# Patient Record
Sex: FEMALE | Race: WHITE | NOT HISPANIC OR LATINO | Employment: OTHER | URBAN - METROPOLITAN AREA
[De-identification: names, ages, dates, MRNs, and addresses within clinical notes are randomized per-mention and may not be internally consistent; named-entity substitution may affect disease eponyms.]

---

## 2019-09-19 ENCOUNTER — OFFICE VISIT (OUTPATIENT)
Dept: ORTHOPEDICS | Facility: CLINIC | Age: 79
End: 2019-09-19
Payer: MEDICARE

## 2019-09-19 VITALS
HEART RATE: 78 BPM | WEIGHT: 192 LBS | DIASTOLIC BLOOD PRESSURE: 68 MMHG | HEIGHT: 67 IN | BODY MASS INDEX: 30.13 KG/M2 | SYSTOLIC BLOOD PRESSURE: 118 MMHG

## 2019-09-19 DIAGNOSIS — M47.812 ARTHRITIS OF FACET JOINT OF CERVICAL SPINE: ICD-10-CM

## 2019-09-19 DIAGNOSIS — M50.30 DEGENERATIVE CERVICAL DISC: ICD-10-CM

## 2019-09-19 DIAGNOSIS — Z98.890 HISTORY OF REPAIR OF RIGHT ROTATOR CUFF: ICD-10-CM

## 2019-09-19 DIAGNOSIS — Z98.890 HISTORY OF ARTHROSCOPY OF RIGHT SHOULDER: Primary | ICD-10-CM

## 2019-09-19 PROCEDURE — 99204 OFFICE O/P NEW MOD 45 MIN: CPT | Mod: 25,S$GLB,, | Performed by: ORTHOPAEDIC SURGERY

## 2019-09-19 PROCEDURE — 20610 LARGE JOINT ASPIRATION/INJECTION: R SUBACROMIAL BURSA: ICD-10-PCS | Mod: RT,S$GLB,, | Performed by: ORTHOPAEDIC SURGERY

## 2019-09-19 PROCEDURE — 20610 DRAIN/INJ JOINT/BURSA W/O US: CPT | Mod: RT,S$GLB,, | Performed by: ORTHOPAEDIC SURGERY

## 2019-09-19 PROCEDURE — 99204 PR OFFICE/OUTPT VISIT, NEW, LEVL IV, 45-59 MIN: ICD-10-PCS | Mod: 25,S$GLB,, | Performed by: ORTHOPAEDIC SURGERY

## 2019-09-19 RX ORDER — METHYLPREDNISOLONE ACETATE 40 MG/ML
40 INJECTION, SUSPENSION INTRA-ARTICULAR; INTRALESIONAL; INTRAMUSCULAR; SOFT TISSUE
Status: DISCONTINUED | OUTPATIENT
Start: 2019-09-19 | End: 2019-09-19 | Stop reason: HOSPADM

## 2019-09-19 RX ORDER — DULOXETIN HYDROCHLORIDE 60 MG/1
60 CAPSULE, DELAYED RELEASE ORAL DAILY
COMMUNITY
End: 2020-06-03

## 2019-09-19 RX ORDER — AMLODIPINE BESYLATE 5 MG/1
5 TABLET ORAL DAILY
COMMUNITY
End: 2020-06-03

## 2019-09-19 RX ORDER — DICLOFENAC SODIUM 1 MG/ML
1 SOLUTION/ DROPS OPHTHALMIC 4 TIMES DAILY
COMMUNITY
End: 2021-12-23

## 2019-09-19 RX ORDER — TRAMADOL HYDROCHLORIDE 50 MG/1
50 TABLET ORAL EVERY 6 HOURS PRN
COMMUNITY
End: 2020-07-01 | Stop reason: SDUPTHER

## 2019-09-19 RX ADMIN — METHYLPREDNISOLONE ACETATE 40 MG: 40 INJECTION, SUSPENSION INTRA-ARTICULAR; INTRALESIONAL; INTRAMUSCULAR; SOFT TISSUE at 11:09

## 2019-09-19 NOTE — PROGRESS NOTES
[facility]    Subjective:     Chief Complaint:   Chief Complaint   Patient presents with    Right Shoulder - Pain     She fell at home in the bathroom on a slippery floor a week ago. Right shoulder pain and neck hurts. She has MRI's done at [facility]  but they are are from 7-11-19       Past Medical History:   Diagnosis Date    Diabetes mellitus, type 2        Past Surgical History:   Procedure Laterality Date    BACK SURGERY      KNEE SURGERY      ovarian tumor      SHOULDER ARTHROSCOPY         Current Outpatient Medications   Medication Sig    amLODIPine (NORVASC) 5 MG tablet Take 5 mg by mouth once daily.    calcium carbonate (OS-IVIS) 600 mg (1,500 mg) Tab Take 600 mg by mouth 2 (two) times daily with meals.    diclofenac (VOLTAREN) 0.1 % ophthalmic solution 1 drop 4 (four) times daily.    DULoxetine (CYMBALTA) 60 MG capsule Take 60 mg by mouth once daily.    escitalopram oxalate (LEXAPRO) 20 MG tablet Take 20 mg by mouth once daily.    evolocumab (REPATHA SURECLICK SUBQ) Inject into the skin.    ranitidine (ZANTAC) 300 MG capsule Take 300 mg by mouth every evening.    traMADol (ULTRAM) 50 mg tablet Take 50 mg by mouth every 6 (six) hours as needed for Pain.    solifenacin (VESICARE) 5 MG tablet Take 1 tablet (5 mg total) by mouth once daily.     No current facility-administered medications for this visit.        Review of patient's allergies indicates:   Allergen Reactions    Adhesive     Ciprofloxacin     Escitalopram oxalate     Pregabalin        No family history on file.    Social History     Socioeconomic History    Marital status:      Spouse name: Not on file    Number of children: Not on file    Years of education: Not on file    Highest education level: Not on file   Occupational History    Not on file   Social Needs    Financial resource strain: Not on file    Food insecurity:     Worry: Not on file     Inability: Not on file    Transportation needs:      Medical: Not on file     Non-medical: Not on file   Tobacco Use    Smoking status: Never Smoker   Substance and Sexual Activity    Alcohol use: Yes     Comment: occasionally    Drug use: No    Sexual activity: Not on file   Lifestyle    Physical activity:     Days per week: Not on file     Minutes per session: Not on file    Stress: Not on file   Relationships    Social connections:     Talks on phone: Not on file     Gets together: Not on file     Attends [employer] service: Not on file     Active member of club or organization: Not on file     Attends meetings of clubs or organizations: Not on file     Relationship status: Not on file   Other Topics Concern    Not on file   Social History Narrative    Not on file       History of present illness: Patient comes in today for her right shoulder and her neck. She's had long-standing right shoulder neck pain. She has undergone a right rotator cuff repair. Unfortunately that failed.      Review of Systems:    Constitution: Negative for chills, fever, and sweats.  Negative for unexplained weight loss.    HENT:  Negative for headaches and blurry vision.    Cardiovascular:Negative for chest pain or irregular heart beat. Negative for hypertension.    Respiratory:  Negative for cough and shortness of breath.    Gastrointestinal: Negative for abdominal pain, heartburn, melena, nausea, and vomitting.    Genitourinary:  Negative bladder incontinence and dysuria.    Musculoskeletal:  See HPI for details.     Neurological: Negative for numbness.    Psychiatric/Behavioral: Negative for depression.  The patient is not nervous/anxious.      Endocrine: Negative for polyuria    Hematologic/Lymphatic: Negative for bleeding problem.  Does not bruise/bleed easily.    Skin: Negative for poor would healing and rash    Objective:      Physical Examination:    Vital Signs:    Vitals:    09/19/19 1052   BP: 118/68   Pulse: 78       Body mass index is 30.07 kg/m².    This a  well-developed, well nourished patient in no acute distress.  They are alert and oriented and cooperative to examination.        Patient has full range of motion of the right shoulder. Her rotator cuff is significantly weak. Spurling sign is negative. She does have pain with motion of the cervical spine. She has limitation of motion of the cervical spine. Full range of motion of the left shoulder.  Pertinent New Results:    XRAY Report / Interpretation:   AP and lateral of the right shoulder demonstrates retained hardware consistent with prior rotator cuff repair    AP and lateral of the cervical spine demonstrates severe degenerative changes of the cervical segments primarily the midcervical segments.    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 I injected the subacromial space on the right shoulder with Depo-Medrol and lidocaine. I've referred her for an epidural steroid injection for her neck. We will check her back in 6 weeks      This note was created using Dragon voice recognition software that occasionally misinterpreted phrases or words.

## 2019-09-19 NOTE — PROCEDURES
Large Joint Aspiration/Injection: R subacromial bursa  Date/Time: 9/19/2019 11:29 AM  Performed by: Sridhar Pitts MD  Authorized by: Sridhar Pitts MD     Consent Done?:  Yes (Verbal)  Indications:  Pain  Procedure site marked: Yes    Timeout: Prior to procedure the correct patient, procedure, and site was verified    Anesthesia  Local anesthesia used  Anesthesia: local infiltration  Anesthetic: lidocaine 1% without epinephrine    Location:  Shoulder  Site:  R subacromial bursa  Prep: Patient was prepped and draped in usual sterile fashion    Needle size:  25 G  Medications:  40 mg methylPREDNISolone acetate 40 mg/mL; 40 mg methylPREDNISolone acetate 40 mg/mL  Patient tolerance:  Patient tolerated the procedure well with no immediate complications

## 2019-09-23 ENCOUNTER — OFFICE VISIT (OUTPATIENT)
Dept: PAIN MEDICINE | Facility: CLINIC | Age: 79
End: 2019-09-23
Payer: MEDICARE

## 2019-09-23 VITALS
HEIGHT: 67 IN | BODY MASS INDEX: 30.13 KG/M2 | SYSTOLIC BLOOD PRESSURE: 116 MMHG | HEART RATE: 82 BPM | WEIGHT: 192 LBS | DIASTOLIC BLOOD PRESSURE: 74 MMHG

## 2019-09-23 DIAGNOSIS — M54.12 CERVICAL RADICULITIS: Primary | ICD-10-CM

## 2019-09-23 DIAGNOSIS — M50.30 DDD (DEGENERATIVE DISC DISEASE), CERVICAL: Primary | ICD-10-CM

## 2019-09-23 DIAGNOSIS — M47.812 ARTHROPATHY OF CERVICAL FACET JOINT: ICD-10-CM

## 2019-09-23 PROCEDURE — 99204 OFFICE O/P NEW MOD 45 MIN: CPT | Mod: S$PBB,,, | Performed by: ANESTHESIOLOGY

## 2019-09-23 PROCEDURE — 99999 PR PBB SHADOW E&M-EST. PATIENT-LVL IV: CPT | Mod: PBBFAC,,, | Performed by: ANESTHESIOLOGY

## 2019-09-23 PROCEDURE — 99214 OFFICE O/P EST MOD 30 MIN: CPT | Mod: PBBFAC,PN | Performed by: ANESTHESIOLOGY

## 2019-09-23 PROCEDURE — 99999 PR PBB SHADOW E&M-EST. PATIENT-LVL IV: ICD-10-PCS | Mod: PBBFAC,,, | Performed by: ANESTHESIOLOGY

## 2019-09-23 PROCEDURE — 99204 PR OFFICE/OUTPT VISIT, NEW, LEVL IV, 45-59 MIN: ICD-10-PCS | Mod: S$PBB,,, | Performed by: ANESTHESIOLOGY

## 2019-09-23 RX ORDER — LORATADINE 10 MG/1
10 TABLET ORAL DAILY
COMMUNITY
End: 2020-06-03

## 2019-09-23 RX ORDER — BACLOFEN 10 MG/1
10 TABLET ORAL 3 TIMES DAILY
COMMUNITY
End: 2019-10-10

## 2019-09-23 RX ORDER — QUETIAPINE FUMARATE 25 MG/1
25 TABLET, FILM COATED ORAL DAILY
COMMUNITY
End: 2020-06-03

## 2019-09-23 RX ORDER — HYDROCHLOROTHIAZIDE 25 MG/1
25 TABLET ORAL DAILY
COMMUNITY
End: 2020-06-03

## 2019-09-23 RX ORDER — LISINOPRIL 10 MG/1
10 TABLET ORAL DAILY
COMMUNITY

## 2019-09-23 RX ORDER — CETIRIZINE HYDROCHLORIDE 10 MG/1
10 TABLET ORAL DAILY
COMMUNITY

## 2019-09-23 RX ORDER — ERGOCALCIFEROL 1.25 MG/1
50000 CAPSULE ORAL
COMMUNITY

## 2019-09-23 RX ORDER — BUSPIRONE HYDROCHLORIDE 10 MG/1
10 TABLET ORAL 3 TIMES DAILY
COMMUNITY
End: 2021-12-23

## 2019-09-23 RX ORDER — FOLIC ACID 1 MG/1
1 TABLET ORAL DAILY
COMMUNITY

## 2019-09-23 RX ORDER — PANTOPRAZOLE SODIUM 40 MG/1
40 TABLET, DELAYED RELEASE ORAL DAILY
COMMUNITY

## 2019-09-23 NOTE — PROGRESS NOTES
Referring Physician: Sridhar Pitts MD    PCP: Ernst Jacobo MD    CC: neck and shoulder pain    HPI:   Brittney Freitas is a 79 y.o. female with PMH significant for hx of right rotator cuff repair (1/2019), ITP (sees Hematologist in [place]), HTN, and GERD presents for the evaluation of neck pain. Neck pain began < 1 year ago. Patient cannot recall neck pain prior to her right rotator cuff surgery. Patient does endorse of intermittent falls sporadically but cannot identify any specific inciting event to her trauma. Patient localizes her neck pain to the right side of her neck. The patient denies of radiation of her pain from her neck. Patient denies of any of numbness or tingling sensation. Patient reports that her pain is a 8-9/10. Patient reports that her pain is constant, burning and sharp pain. Patient denies of any urinary/fecal incontinence, saddle anesthesia, or recent falls.     Aggravating factors: extension of the neck and lateral rotation     Mitigating factors: neck injection at [facility] years ago.     Relevant Surgeries: yes; right rotator cuff repair; hx of lumbar surgery     Interventional Therapies: yes   9/19/2019: R subacromial bursa with some benefit via Dr. Pitts     Reports of receiving neck injections at [facility] years ago with some benefit    : Reviewed and consistent with medication use as prescribed.    Non-pharmacologic Treatment:     · Physical Therapy: yes; shoulder PT worsened her pain   · Ice/Heat: no  · TENS: no  · Massage: no  · Chiropractic care: no  · Acupuncture: no         Pain Medications:         · Currently taking:   · - Opioids: Ultram (Tramadol HCL)   · Quetiapine 25 mg PO QHS; duloxetine 60 mg PO daily; buspirone 10 mg      · Has tried in the past:    · Opioids: yes, tramadol 50 mg   · NSAIDS: no, cannot take secondary to ITP  · Tylenol: yes; takes extra strength tylenol   · Muscle relaxants: yes; baclofen without relief   · TCAs:  no  · SNRIs: no   · Anticonvulsants: no  · topical creams: yes; tried topical creams in the past     Anticoagulation: no    ROS:  Review of Systems   Constitutional: Negative for chills and fever.   HENT: Negative for sore throat.    Eyes: Negative for visual disturbance.   Respiratory: Negative for shortness of breath.    Cardiovascular: Negative for chest pain.   Gastrointestinal: Negative for nausea and vomiting.   Genitourinary: Negative for difficulty urinating.   Musculoskeletal: Positive for arthralgias and neck pain.   Skin: Negative for rash.   Allergic/Immunologic: Negative for immunocompromised state.   Neurological: Negative for syncope.   Hematological: Does not bruise/bleed easily.   Psychiatric/Behavioral: Negative for suicidal ideas.        Past Medical History:   Diagnosis Date    Diabetes mellitus, type 2      Past Surgical History:   Procedure Laterality Date    BACK SURGERY      KNEE SURGERY      ovarian tumor      SHOULDER ARTHROSCOPY       History reviewed. No pertinent family history.  Social History     Socioeconomic History    Marital status:      Spouse name: Not on file    Number of children: Not on file    Years of education: Not on file    Highest education level: Not on file   Occupational History    Not on file   Social Needs    Financial resource strain: Not on file    Food insecurity:     Worry: Not on file     Inability: Not on file    Transportation needs:     Medical: Not on file     Non-medical: Not on file   Tobacco Use    Smoking status: Never Smoker    Smokeless tobacco: Never Used   Substance and Sexual Activity    Alcohol use: Yes     Comment: occasionally    Drug use: No    Sexual activity: Not on file   Lifestyle    Physical activity:     Days per week: Not on file     Minutes per session: Not on file    Stress: Not on file   Relationships    Social connections:     Talks on phone: Not on file     Gets together: Not on file     Attends Congregation  "service: Not on file     Active member of club or organization: Not on file     Attends meetings of clubs or organizations: Not on file     Relationship status: Not on file   Other Topics Concern    Not on file   Social History Narrative    Not on file         Allergies: See med card    Vitals:    09/23/19 1026   BP: 116/74   Pulse: 82   Weight: 87.1 kg (192 lb 0.3 oz)   Height: 5' 7" (1.702 m)   PainSc: 10-Worst pain ever   PainLoc: Generalized         Physical exam:  Physical Exam   Constitutional: She is oriented to person, place, and time and well-developed, well-nourished, and in no distress.   HENT:   Head: Normocephalic and atraumatic.   Eyes: Conjunctivae and EOM are normal. Right eye exhibits no discharge. Left eye exhibits no discharge.   Cardiovascular: Normal rate.   Pulmonary/Chest: Effort normal and breath sounds normal. No respiratory distress.   Abdominal: Soft.   Neurological: She is alert and oriented to person, place, and time.   Skin: Skin is warm and dry. No rash noted. She is not diaphoretic.   Psychiatric: Mood, memory, affect and judgment normal.   Nursing note and vitals reviewed.       UPPER EXTREMITIES: Normal alignment, normal range of motion, no atrophy, no skin changes,  hair growth and nail growth normal and equal bilaterally. No swelling, no tenderness.    LOWER EXTREMITIES:  Normal alignment, normal range of motion, no atrophy, no skin changes,  hair growth and nail growth normal and equal bilaterally. No swelling, no tenderness.    CERVICAL SPINE:  Cervical spine: Limited ROM is in flexion, extension and lateral rotation with increased pain.  ((+)) Spurling's maneuver bilaterally for neck pain but not radicular pain  Myofascial exam: Tenderness to palpation across cervical paraspinous region bilaterally.    CRANIAL NERVES:  II:  PERRL bilaterally,   III,IV,VI: EOMI.    V:  Facial sensation equal bilaterally  VII:  Facial motor function normal.  VIII:  Hearing equal to finger rub " bilaterally  IX/X: Gag normal, palate symmetric  XI:  Shoulder shrug equal, head turn equal  XII:  Tongue midline without fasciculations      MOTOR: Tone and bulk: normal bilateral upper and lower Strength: normal   Delt Bi Tri WE WF     R 5 5 5 5 5 5   L 5 5 5 5 5 5     IP ADD ABD Quad TA Gas HAM  R 5 5 5 5 5 5 5  L 5 5 5 5 5 5 5    SENSATION: Decreased sensation in the C8 distribution of the right hand.   REFLEXES: normal, symmetric, nonbrisk.  Toes down, no clonus. Negative clark's sign bilaterally.  GAIT: normal rise, base, steps, and arm swing.        Imaging: OS Cervical MRI without contrast report (4/10/2019):  C2/C3: Disc desiccation. No disc herniation. The AP canal diameter measures 10.8 mm. Uncovertebral joint osteophytosis and facet joint arthrosis cause mild left foraminal stenosis. Minimal right foraminal stenosis.   C3/C4: Mild disc space narrowing. Mild annular disc bulge. Uncovertebral joint osteophytosis, severe facet joint arthrosis and ligamentum flavum hypertrophy. Severe right-sided facet joint arthrosis with facet joint marrow edema and a facet joint effusion. Partial circumferential effacement of the dural sac with a narrowed AP canal diameter measuring 8.4 mm. Severe bilateral foraminal stenosis.   C4/C5: Mild disc space narrowing. Mild annular disc bulge. Small central disc protrusion. Uncovertebral joint osteophytosis, advanced facet joint arthrosis and ligamentum flavum hypertrophy. Partial circumferential effacement of the dural sac with a narrowed AP canal diameter measuring 8.6 mm. Moderate-to-severe bilateral foraminal stenosis.   C5/C6: Severe disc space narrowing. Mild annular disc bulge. Uncovertebral joint osteophytosis, advanced facet joint arthrosis and ligamentum flavum hypertrophy. Partial circumferential effacement of the dural sac with a narrowed AP canal diameter measuring 8.5 mm. Severe right foraminal stenosis. Moderate-to-severe left foraminal stenosis.   C6/C7:  Severe disc space narrowing. Mild annular disc bulge. Small central disc protrusion. Uncovertebral joint osteophytosis, advanced facet joint arthrosis and ligamentum flavum hypertrophy. Partial circumferential effacement of the dural sac with a narrowed AP canal diameter measuring 9.2 mm. Severe bilateral foraminal stenosis.   C7/T1: Mild disc space narrowing. Minimal central disc protrusion. The AP canal diameter measures 11.2 mm. Uncovertebral joint osteophytes and facet joint arthrosis. Patent neural foramen.       Assessment: Brittney Freitas is a 79 y.o. female with PMH significant for hx of right rotator cuff repair (1/2019), ITP (sees Hematologist in [place]), HTN, and GERD presents for the evaluation of neck pain. Recent MRI of the cervical spine significant for DDD of the cervical spine and cervical radiculopathy. Treatment plan outlined below.     Plan:  - Schedule for cervical interlaminar epidural steroid injection at C7-T1; patient to bring recent platelet count prior to having her procedure done.   - Compound cream prescribed to use PRN  - RTC for procedure as listed above     Thank you for referring this interesting patient, and I look forward to continuing to collaborate in her care.    Nicholas Pardo MD  Pain Medicine

## 2019-09-23 NOTE — LETTER
September 23, 2019      Sridhar Pitts MD  [address]  [address]  [place] [address]           [place] [facility]  [address] [place] [address] [address]  [place] [address]  Phone: [phone]  Fax: [phone]          Patient: Brittney Freitas   MR Number: [id number]   YOB: 1940   Date of Visit: 9/23/2019       Dear Dr. Sridhar Pitts:    Thank you for referring Brittney Freitas to me for evaluation. Attached you will find relevant portions of my assessment and plan of care.    If you have questions, please do not hesitate to call me. I look forward to following Brittney Freitas along with you.    Sincerely,    Nicholas Pardo MD    Enclosure  CC:  No Recipients    If you would like to receive this communication electronically, please contact [email or web address] or [phone] to request more information on [employer] access.    For providers and/or their staff who would like to refer a patient to [facility], please contact us through our one-stop-shop provider referral line, [facility], at [phone].    If you feel you have received this communication in error or would no longer like to receive these types of communications, please e-mail [email or web address]

## 2019-09-23 NOTE — H&P (VIEW-ONLY)
Referring Physician: Sridhar Pitts MD    PCP: Ernst Jacobo MD    CC: neck and shoulder pain    HPI:   Brittney Freitas is a 79 y.o. female with PMH significant for hx of right rotator cuff repair (1/2019), ITP (sees Hematologist in [place]), HTN, and GERD presents for the evaluation of neck pain. Neck pain began < 1 year ago. Patient cannot recall neck pain prior to her right rotator cuff surgery. Patient does endorse of intermittent falls sporadically but cannot identify any specific inciting event to her trauma. Patient localizes her neck pain to the right side of her neck. The patient denies of radiation of her pain from her neck. Patient denies of any of numbness or tingling sensation. Patient reports that her pain is a 8-9/10. Patient reports that her pain is constant, burning and sharp pain. Patient denies of any urinary/fecal incontinence, saddle anesthesia, or recent falls.     Aggravating factors: extension of the neck and lateral rotation     Mitigating factors: neck injection at [facility] years ago.     Relevant Surgeries: yes; right rotator cuff repair; hx of lumbar surgery     Interventional Therapies: yes   9/19/2019: R subacromial bursa with some benefit via Dr. Pitts     Reports of receiving neck injections at [facility] years ago with some benefit    : Reviewed and consistent with medication use as prescribed.    Non-pharmacologic Treatment:     · Physical Therapy: yes; shoulder PT worsened her pain   · Ice/Heat: no  · TENS: no  · Massage: no  · Chiropractic care: no  · Acupuncture: no         Pain Medications:         · Currently taking:   · - Opioids: Ultram (Tramadol HCL)   · Quetiapine 25 mg PO QHS; duloxetine 60 mg PO daily; buspirone 10 mg      · Has tried in the past:    · Opioids: yes, tramadol 50 mg   · NSAIDS: no, cannot take secondary to ITP  · Tylenol: yes; takes extra strength tylenol   · Muscle relaxants: yes; baclofen without relief   · TCAs:  no  · SNRIs: no   · Anticonvulsants: no  · topical creams: yes; tried topical creams in the past     Anticoagulation: no    ROS:  Review of Systems   Constitutional: Negative for chills and fever.   HENT: Negative for sore throat.    Eyes: Negative for visual disturbance.   Respiratory: Negative for shortness of breath.    Cardiovascular: Negative for chest pain.   Gastrointestinal: Negative for nausea and vomiting.   Genitourinary: Negative for difficulty urinating.   Musculoskeletal: Positive for arthralgias and neck pain.   Skin: Negative for rash.   Allergic/Immunologic: Negative for immunocompromised state.   Neurological: Negative for syncope.   Hematological: Does not bruise/bleed easily.   Psychiatric/Behavioral: Negative for suicidal ideas.        Past Medical History:   Diagnosis Date    Diabetes mellitus, type 2      Past Surgical History:   Procedure Laterality Date    BACK SURGERY      KNEE SURGERY      ovarian tumor      SHOULDER ARTHROSCOPY       History reviewed. No pertinent family history.  Social History     Socioeconomic History    Marital status:      Spouse name: Not on file    Number of children: Not on file    Years of education: Not on file    Highest education level: Not on file   Occupational History    Not on file   Social Needs    Financial resource strain: Not on file    Food insecurity:     Worry: Not on file     Inability: Not on file    Transportation needs:     Medical: Not on file     Non-medical: Not on file   Tobacco Use    Smoking status: Never Smoker    Smokeless tobacco: Never Used   Substance and Sexual Activity    Alcohol use: Yes     Comment: occasionally    Drug use: No    Sexual activity: Not on file   Lifestyle    Physical activity:     Days per week: Not on file     Minutes per session: Not on file    Stress: Not on file   Relationships    Social connections:     Talks on phone: Not on file     Gets together: Not on file     Attends Yarsanism  "service: Not on file     Active member of club or organization: Not on file     Attends meetings of clubs or organizations: Not on file     Relationship status: Not on file   Other Topics Concern    Not on file   Social History Narrative    Not on file         Allergies: See med card    Vitals:    09/23/19 1026   BP: 116/74   Pulse: 82   Weight: 87.1 kg (192 lb 0.3 oz)   Height: 5' 7" (1.702 m)   PainSc: 10-Worst pain ever   PainLoc: Generalized         Physical exam:  Physical Exam   Constitutional: She is oriented to person, place, and time and well-developed, well-nourished, and in no distress.   HENT:   Head: Normocephalic and atraumatic.   Eyes: Conjunctivae and EOM are normal. Right eye exhibits no discharge. Left eye exhibits no discharge.   Cardiovascular: Normal rate.   Pulmonary/Chest: Effort normal and breath sounds normal. No respiratory distress.   Abdominal: Soft.   Neurological: She is alert and oriented to person, place, and time.   Skin: Skin is warm and dry. No rash noted. She is not diaphoretic.   Psychiatric: Mood, memory, affect and judgment normal.   Nursing note and vitals reviewed.       UPPER EXTREMITIES: Normal alignment, normal range of motion, no atrophy, no skin changes,  hair growth and nail growth normal and equal bilaterally. No swelling, no tenderness.    LOWER EXTREMITIES:  Normal alignment, normal range of motion, no atrophy, no skin changes,  hair growth and nail growth normal and equal bilaterally. No swelling, no tenderness.    CERVICAL SPINE:  Cervical spine: Limited ROM is in flexion, extension and lateral rotation with increased pain.  ((+)) Spurling's maneuver bilaterally for neck pain but not radicular pain  Myofascial exam: Tenderness to palpation across cervical paraspinous region bilaterally.    CRANIAL NERVES:  II:  PERRL bilaterally,   III,IV,VI: EOMI.    V:  Facial sensation equal bilaterally  VII:  Facial motor function normal.  VIII:  Hearing equal to finger rub " bilaterally  IX/X: Gag normal, palate symmetric  XI:  Shoulder shrug equal, head turn equal  XII:  Tongue midline without fasciculations      MOTOR: Tone and bulk: normal bilateral upper and lower Strength: normal   Delt Bi Tri WE WF     R 5 5 5 5 5 5   L 5 5 5 5 5 5     IP ADD ABD Quad TA Gas HAM  R 5 5 5 5 5 5 5  L 5 5 5 5 5 5 5    SENSATION: Decreased sensation in the C8 distribution of the right hand.   REFLEXES: normal, symmetric, nonbrisk.  Toes down, no clonus. Negative clark's sign bilaterally.  GAIT: normal rise, base, steps, and arm swing.        Imaging: OS Cervical MRI without contrast report (4/10/2019):  C2/C3: Disc desiccation. No disc herniation. The AP canal diameter measures 10.8 mm. Uncovertebral joint osteophytosis and facet joint arthrosis cause mild left foraminal stenosis. Minimal right foraminal stenosis.   C3/C4: Mild disc space narrowing. Mild annular disc bulge. Uncovertebral joint osteophytosis, severe facet joint arthrosis and ligamentum flavum hypertrophy. Severe right-sided facet joint arthrosis with facet joint marrow edema and a facet joint effusion. Partial circumferential effacement of the dural sac with a narrowed AP canal diameter measuring 8.4 mm. Severe bilateral foraminal stenosis.   C4/C5: Mild disc space narrowing. Mild annular disc bulge. Small central disc protrusion. Uncovertebral joint osteophytosis, advanced facet joint arthrosis and ligamentum flavum hypertrophy. Partial circumferential effacement of the dural sac with a narrowed AP canal diameter measuring 8.6 mm. Moderate-to-severe bilateral foraminal stenosis.   C5/C6: Severe disc space narrowing. Mild annular disc bulge. Uncovertebral joint osteophytosis, advanced facet joint arthrosis and ligamentum flavum hypertrophy. Partial circumferential effacement of the dural sac with a narrowed AP canal diameter measuring 8.5 mm. Severe right foraminal stenosis. Moderate-to-severe left foraminal stenosis.   C6/C7:  Severe disc space narrowing. Mild annular disc bulge. Small central disc protrusion. Uncovertebral joint osteophytosis, advanced facet joint arthrosis and ligamentum flavum hypertrophy. Partial circumferential effacement of the dural sac with a narrowed AP canal diameter measuring 9.2 mm. Severe bilateral foraminal stenosis.   C7/T1: Mild disc space narrowing. Minimal central disc protrusion. The AP canal diameter measures 11.2 mm. Uncovertebral joint osteophytes and facet joint arthrosis. Patent neural foramen.       Assessment: Brittney Freitas is a 79 y.o. female with PMH significant for hx of right rotator cuff repair (1/2019), ITP (sees Hematologist in [place]), HTN, and GERD presents for the evaluation of neck pain. Recent MRI of the cervical spine significant for DDD of the cervical spine and cervical radiculopathy. Treatment plan outlined below.     Plan:  - Schedule for cervical interlaminar epidural steroid injection at C7-T1; patient to bring recent platelet count prior to having her procedure done.   - Compound cream prescribed to use PRN  - RTC for procedure as listed above     Thank you for referring this interesting patient, and I look forward to continuing to collaborate in her care.    Nicholas Pardo MD  Pain Medicine

## 2019-09-24 ENCOUNTER — TELEPHONE (OUTPATIENT)
Dept: PAIN MEDICINE | Facility: CLINIC | Age: 79
End: 2019-09-24

## 2019-09-24 DIAGNOSIS — M54.12 CERVICAL RADICULITIS: Primary | ICD-10-CM

## 2019-09-24 NOTE — TELEPHONE ENCOUNTER
----- Message from Mariel Kaba sent at 9/24/2019 11:23 AM CDT -----  Contact: Patient  Type: Needs Medical Advice    Who Called:  Patient  Best Call Back Number: [phone]  Additional Information: Patient is calling to get labs ordered.Please call back and advise.

## 2019-09-25 ENCOUNTER — LAB VISIT (OUTPATIENT)
Dept: LAB | Facility: HOSPITAL | Age: 79
End: 2019-09-25
Attending: ANESTHESIOLOGY
Payer: MEDICARE

## 2019-09-25 ENCOUNTER — TELEPHONE (OUTPATIENT)
Dept: PAIN MEDICINE | Facility: CLINIC | Age: 79
End: 2019-09-25

## 2019-09-25 DIAGNOSIS — M54.12 CERVICAL RADICULITIS: ICD-10-CM

## 2019-09-25 LAB
BASOPHILS # BLD AUTO: 0.11 K/UL (ref 0–0.2)
BASOPHILS NFR BLD: 1.5 % (ref 0–1.9)
DIFFERENTIAL METHOD: ABNORMAL
EOSINOPHIL # BLD AUTO: 0.2 K/UL (ref 0–0.5)
EOSINOPHIL NFR BLD: 2.4 % (ref 0–8)
ERYTHROCYTE [DISTWIDTH] IN BLOOD BY AUTOMATED COUNT: 12.8 % (ref 11.5–14.5)
HCT VFR BLD AUTO: 41.2 % (ref 37–48.5)
HGB BLD-MCNC: 13.3 G/DL (ref 12–16)
IMM GRANULOCYTES # BLD AUTO: 0.02 K/UL (ref 0–0.04)
LYMPHOCYTES # BLD AUTO: 2.2 K/UL (ref 1–4.8)
LYMPHOCYTES NFR BLD: 29.8 % (ref 18–48)
MCH RBC QN AUTO: 30.2 PG (ref 27–31)
MCHC RBC AUTO-ENTMCNC: 32.3 G/DL (ref 32–36)
MCV RBC AUTO: 93 FL (ref 82–98)
MONOCYTES # BLD AUTO: 0.5 K/UL (ref 0.3–1)
MONOCYTES NFR BLD: 7.2 % (ref 4–15)
NEUTROPHILS # BLD AUTO: 4.3 K/UL (ref 1.8–7.7)
NEUTROPHILS NFR BLD: 58.8 % (ref 38–73)
NRBC BLD-RTO: 0 /100 WBC
PLATELET # BLD AUTO: ABNORMAL K/UL (ref 150–350)
PLATELET BLD QL SMEAR: ABNORMAL
PMV BLD AUTO: ABNORMAL FL (ref 9.2–12.9)
RBC # BLD AUTO: 4.41 M/UL (ref 4–5.4)
WBC # BLD AUTO: 7.36 K/UL (ref 3.9–12.7)

## 2019-09-25 PROCEDURE — 36415 COLL VENOUS BLD VENIPUNCTURE: CPT

## 2019-09-25 PROCEDURE — 85025 COMPLETE CBC W/AUTO DIFF WBC: CPT

## 2019-09-25 NOTE — TELEPHONE ENCOUNTER
"Called the patient in regards to recent platelet count on recent CBC. Numeric value of platelet count could not be obtained secondary to "clumping" of the specimen. Patient to bring clearance from her Hematologist who she has seen for her ITP for her upcoming procedure. All questions answered.     Nicholas Pardo MD  Pain Management  "

## 2019-09-26 ENCOUNTER — TELEPHONE (OUTPATIENT)
Dept: PAIN MEDICINE | Facility: CLINIC | Age: 79
End: 2019-09-26

## 2019-09-26 ENCOUNTER — LAB VISIT (OUTPATIENT)
Dept: LAB | Facility: HOSPITAL | Age: 79
End: 2019-09-26
Attending: ANESTHESIOLOGY
Payer: MEDICARE

## 2019-09-26 DIAGNOSIS — M50.30 DDD (DEGENERATIVE DISC DISEASE), CERVICAL: Primary | ICD-10-CM

## 2019-09-26 DIAGNOSIS — M50.30 DDD (DEGENERATIVE DISC DISEASE), CERVICAL: ICD-10-CM

## 2019-09-26 LAB
BASOPHILS # BLD AUTO: 0.12 K/UL (ref 0–0.2)
BASOPHILS NFR BLD: 1.3 % (ref 0–1.9)
DIFFERENTIAL METHOD: NORMAL
EOSINOPHIL # BLD AUTO: 0.2 K/UL (ref 0–0.5)
EOSINOPHIL NFR BLD: 2.6 % (ref 0–8)
ERYTHROCYTE [DISTWIDTH] IN BLOOD BY AUTOMATED COUNT: 12.8 % (ref 11.5–14.5)
HCT VFR BLD AUTO: 42.4 % (ref 37–48.5)
HGB BLD-MCNC: 14.1 G/DL (ref 12–16)
IMM GRANULOCYTES # BLD AUTO: 0.02 K/UL (ref 0–0.04)
IMM GRANULOCYTES NFR BLD AUTO: 0.2 % (ref 0–0.5)
LYMPHOCYTES # BLD AUTO: 2.9 K/UL (ref 1–4.8)
LYMPHOCYTES NFR BLD: 31.9 % (ref 18–48)
MCH RBC QN AUTO: 30.3 PG (ref 27–31)
MCHC RBC AUTO-ENTMCNC: 33.3 G/DL (ref 32–36)
MCV RBC AUTO: 91 FL (ref 82–98)
MONOCYTES # BLD AUTO: 0.8 K/UL (ref 0.3–1)
MONOCYTES NFR BLD: 8.5 % (ref 4–15)
NEUTROPHILS # BLD AUTO: 5 K/UL (ref 1.8–7.7)
NEUTROPHILS NFR BLD: 55.5 % (ref 38–73)
NRBC BLD-RTO: 0 /100 WBC
PLATELET # BLD AUTO: 260 K/UL (ref 150–350)
PMV BLD AUTO: 10.4 FL (ref 9.2–12.9)
RBC # BLD AUTO: 4.65 M/UL (ref 4–5.4)
WBC # BLD AUTO: 8.96 K/UL (ref 3.9–12.7)

## 2019-09-26 PROCEDURE — 85025 COMPLETE CBC W/AUTO DIFF WBC: CPT

## 2019-09-26 PROCEDURE — 36415 COLL VENOUS BLD VENIPUNCTURE: CPT

## 2019-09-26 NOTE — TELEPHONE ENCOUNTER
----- Message from Mayte Anand sent at 9/26/2019 12:29 PM CDT -----  Contact: self  Patient need orders for labs to be done today in [facility] please per patient       Please call to advice [phone]        Patient states she need labs done for injection appt that is on 09/30    Patient states her blood clotted last lab she done recently per patient

## 2019-09-30 ENCOUNTER — HOSPITAL ENCOUNTER (OUTPATIENT)
Facility: AMBULARY SURGERY CENTER | Age: 79
Discharge: HOME | End: 2019-09-30
Attending: ANESTHESIOLOGY | Admitting: ANESTHESIOLOGY
Payer: MEDICARE

## 2019-09-30 DIAGNOSIS — M50.30 DEGENERATIVE DISC DISEASE, CERVICAL: Primary | ICD-10-CM

## 2019-09-30 LAB — POCT GLUCOSE: 114 MG/DL (ref 70–110)

## 2019-09-30 PROCEDURE — 99152 PR MOD CONSCIOUS SEDATION, SAME PHYS, 5+ YRS, FIRST 15 MIN: ICD-10-PCS | Mod: ,,, | Performed by: ANESTHESIOLOGY

## 2019-09-30 PROCEDURE — 62321 NJX INTERLAMINAR CRV/THRC: CPT | Performed by: ANESTHESIOLOGY

## 2019-09-30 PROCEDURE — 62321 NJX INTERLAMINAR CRV/THRC: CPT | Mod: ,,, | Performed by: ANESTHESIOLOGY

## 2019-09-30 PROCEDURE — 62321 PR INJ CERV/THORAC, W/GUIDANCE: ICD-10-PCS | Mod: ,,, | Performed by: ANESTHESIOLOGY

## 2019-09-30 PROCEDURE — 99152 MOD SED SAME PHYS/QHP 5/>YRS: CPT | Mod: ,,, | Performed by: ANESTHESIOLOGY

## 2019-09-30 RX ORDER — SODIUM CHLORIDE 9 MG/ML
INJECTION, SOLUTION INTRAMUSCULAR; INTRAVENOUS; SUBCUTANEOUS
Status: DISCONTINUED | OUTPATIENT
Start: 2019-09-30 | End: 2019-09-30 | Stop reason: HOSPADM

## 2019-09-30 RX ORDER — LIDOCAINE HYDROCHLORIDE 10 MG/ML
INJECTION, SOLUTION EPIDURAL; INFILTRATION; INTRACAUDAL; PERINEURAL
Status: DISCONTINUED | OUTPATIENT
Start: 2019-09-30 | End: 2019-09-30 | Stop reason: HOSPADM

## 2019-09-30 RX ORDER — MIDAZOLAM HYDROCHLORIDE 2 MG/2ML
INJECTION, SOLUTION INTRAMUSCULAR; INTRAVENOUS
Status: DISCONTINUED | OUTPATIENT
Start: 2019-09-30 | End: 2019-09-30 | Stop reason: HOSPADM

## 2019-09-30 RX ORDER — DEXAMETHASONE SODIUM PHOSPHATE 10 MG/ML
INJECTION INTRAMUSCULAR; INTRAVENOUS
Status: DISCONTINUED | OUTPATIENT
Start: 2019-09-30 | End: 2019-09-30 | Stop reason: HOSPADM

## 2019-09-30 RX ORDER — FENTANYL CITRATE 50 UG/ML
INJECTION, SOLUTION INTRAMUSCULAR; INTRAVENOUS
Status: DISCONTINUED | OUTPATIENT
Start: 2019-09-30 | End: 2019-09-30 | Stop reason: HOSPADM

## 2019-09-30 RX ORDER — SODIUM CHLORIDE, SODIUM LACTATE, POTASSIUM CHLORIDE, CALCIUM CHLORIDE 600; 310; 30; 20 MG/100ML; MG/100ML; MG/100ML; MG/100ML
INJECTION, SOLUTION INTRAVENOUS CONTINUOUS
Status: ACTIVE | OUTPATIENT
Start: 2019-09-30

## 2019-09-30 RX ADMIN — SODIUM CHLORIDE, SODIUM LACTATE, POTASSIUM CHLORIDE, CALCIUM CHLORIDE: 600; 310; 30; 20 INJECTION, SOLUTION INTRAVENOUS at 02:09

## 2019-09-30 NOTE — OP NOTE
PROCEDURE DATE: 9/30/2019    PROCEDURE: C7-T1 cervical interlaminar epidural steroid injection under utilizing fluoroscopy.    DIAGNOSIS: Cervical Degenerative Disc Diease; Cervical Radiculitis  POSTOP DIAGNOSIS: SAME    PHYSICIAN: Nicholas Pardo MD    MEDICATIONS INJECTED:  Dexamethasone 10 mg followed by a slow injection of 4 mL sterile, preservative-free normal saline.    LOCAL ANESTHETIC USED: Lidocaine 1%, 3 ml.    SEDATION MEDICATIONS: RN IV sedation    COMPLICATIONS:  none    ESTIMATED BLOOD LOSS: none    TECHNIQUE:  A time-out was taken to identify patient and procedure prior to starting the procedure.  With the patient laying in a prone position with the neck in a mid-flexed forward position, the area was prepped and draped in the usual sterile fashion using ChloraPrep and a fenestrated drape.  The area was determined under AP fluoroscopic guidance.  Local anesthetic was given using a 25-gauge 1.5 inch needle by raising a wheal and then infiltrating ventrally.  A 3.5 inch 20-gauge Touhy needle was introduced under fluoroscopic guidance to meet the lamina of C7.  The needle was then hinged under the lamina then advanced using loss of resistance technique.  Once the tip of the needle was in the desired position, the 3 mL contrast dye Omnipaque was injected to determine placement and no uptake.  The steroid was then injected slowly followed by a slow injection of 4 mL of the sterile preservative-free normal saline.  The patient tolerated the procedure well.    The patient was monitored after the procedure and was given post-procedure and discharge instructions to follow at home. The patient was discharged in a stable condition.

## 2019-09-30 NOTE — INTERVAL H&P NOTE
The patient has been examined and the H&P has been reviewed:    I concur with the findings and no changes have occurred since H&P was written.    Anesthesia/Surgery risks, benefits and alternative options discussed and understood by patient/family.    This patient has been cleared for surgery in an ambulatory surgical facility    ASA 3,  Mallampatti Score 3  No history of anesthetic complications  Plan for RN IV sedation            Active Hospital Problems    Diagnosis  POA    Degenerative disc disease, cervical [M50.30]  Yes      Resolved Hospital Problems   No resolved problems to display.

## 2019-09-30 NOTE — DISCHARGE SUMMARY
[facility]  Discharge Note  Short Stay    Admit Date: 9/30/2019    Discharge Date and Time: 9/30/2019    Attending Physician: Nicholas Pardo MD     Discharge Provider: Nicholas Pardo    Diagnoses:  Active Hospital Problems    Diagnosis  POA    *Degenerative disc disease, cervical [M50.30]  Yes      Resolved Hospital Problems   No resolved problems to display.       Hospital Course: Cervical interlaminar epidural steroid injection    Discharged Condition: Good    Final Diagnoses:   Active Hospital Problems    Diagnosis  POA    *Degenerative disc disease, cervical [M50.30]  Yes      Resolved Hospital Problems   No resolved problems to display.       Disposition: Home or Self Care    Follow up/Patient Instructions:    Medications:  Reconciled Home Medications:      Medication List      CONTINUE taking these medications    amLODIPine 5 MG tablet  Commonly known as:  NORVASC  Take 5 mg by mouth once daily.     baclofen 10 MG tablet  Commonly known as:  LIORESAL  Take 10 mg by mouth 3 (three) times daily.     busPIRone 10 MG tablet  Commonly known as:  BUSPAR  Take 10 mg by mouth 3 (three) times daily.     calcium carbonate 600 mg calcium (1,500 mg) Tab  Commonly known as:  OS-IVIS  Take 600 mg by mouth 2 (two) times daily with meals.     cetirizine 10 MG tablet  Commonly known as:  ZYRTEC  Take 10 mg by mouth once daily.     CYMBALTA 60 MG capsule  Generic drug:  DULoxetine  Take 60 mg by mouth once daily.     diclofenac 0.1 % ophthalmic solution  Commonly known as:  VOLTAREN  1 drop 4 (four) times daily.     ergocalciferol 50,000 unit Cap  Commonly known as:  ERGOCALCIFEROL  Take 50,000 Units by mouth every 7 days.     escitalopram oxalate 20 MG tablet  Commonly known as:  LEXAPRO  Take 20 mg by mouth once daily.     folic acid 1 MG tablet  Commonly known as:  FOLVITE  Take 1 mg by mouth once daily.     hydroCHLOROthiazide 25 MG tablet  Commonly known as:  HYDRODIURIL  Take 25 mg by mouth once daily.      lisinopril 20 MG tablet  Commonly known as:  PRINIVIL,ZESTRIL  Take 20 mg by mouth once daily.     loratadine 10 mg tablet  Commonly known as:  CLARITIN  Take 10 mg by mouth once daily.     PROTONIX 40 MG tablet  Generic drug:  pantoprazole  Take 40 mg by mouth once daily.     QUEtiapine 25 MG Tab  Commonly known as:  SEROQUEL  Take 25 mg by mouth once daily.     ranitidine 300 MG capsule  Commonly known as:  ZANTAC  Take 300 mg by mouth every evening.     REPATHA SURECLICK SUBQ  Inject into the skin.     solifenacin 5 MG tablet  Commonly known as:  VESICARE  Take 1 tablet (5 mg total) by mouth once daily.     traMADol 50 mg tablet  Commonly known as:  ULTRAM  Take 50 mg by mouth every 6 (six) hours as needed for Pain.          Discharge Procedure Orders   Diet general     Call MD for:  temperature >100.4     Call MD for:  persistent nausea and vomiting     Call MD for:  severe uncontrolled pain     Call MD for:  difficulty breathing, headache or visual disturbances     Call MD for:  redness, tenderness, or signs of infection (pain, swelling, redness, odor or green/yellow discharge around incision site)     Call MD for:  hives     Call MD for:  persistent dizziness or light-headedness     Call MD for:  extreme fatigue        Follow up with MD in 2-3 weeks    Discharge Procedure Orders (must include Diet, Follow-up, Activity):   Discharge Procedure Orders (must include Diet, Follow-up, Activity)   Diet general     Call MD for:  temperature >100.4     Call MD for:  persistent nausea and vomiting     Call MD for:  severe uncontrolled pain     Call MD for:  difficulty breathing, headache or visual disturbances     Call MD for:  redness, tenderness, or signs of infection (pain, swelling, redness, odor or green/yellow discharge around incision site)     Call MD for:  hives     Call MD for:  persistent dizziness or light-headedness     Call MD for:  extreme fatigue

## 2019-10-01 VITALS
RESPIRATION RATE: 18 BRPM | OXYGEN SATURATION: 92 % | DIASTOLIC BLOOD PRESSURE: 74 MMHG | BODY MASS INDEX: 30.13 KG/M2 | WEIGHT: 192 LBS | SYSTOLIC BLOOD PRESSURE: 153 MMHG | HEART RATE: 80 BPM | HEIGHT: 67 IN | TEMPERATURE: 98 F

## 2019-10-10 ENCOUNTER — NURSE TRIAGE (OUTPATIENT)
Dept: ADMINISTRATIVE | Facility: CLINIC | Age: 79
End: 2019-10-10

## 2019-10-10 ENCOUNTER — HOSPITAL ENCOUNTER (EMERGENCY)
Facility: HOSPITAL | Age: 79
Discharge: HOME | End: 2019-10-10
Attending: FAMILY MEDICINE
Payer: MEDICARE

## 2019-10-10 VITALS
SYSTOLIC BLOOD PRESSURE: 149 MMHG | HEART RATE: 104 BPM | RESPIRATION RATE: 19 BRPM | WEIGHT: 184 LBS | OXYGEN SATURATION: 97 % | HEIGHT: 68 IN | TEMPERATURE: 98 F | DIASTOLIC BLOOD PRESSURE: 74 MMHG | BODY MASS INDEX: 27.89 KG/M2

## 2019-10-10 DIAGNOSIS — M54.12 CERVICAL RADICULOPATHY: ICD-10-CM

## 2019-10-10 DIAGNOSIS — M62.838 MUSCLE SPASMS OF NECK: Primary | ICD-10-CM

## 2019-10-10 DIAGNOSIS — M50.30 DEGENERATIVE DISC DISEASE, CERVICAL: ICD-10-CM

## 2019-10-10 PROCEDURE — 99284 EMERGENCY DEPT VISIT MOD MDM: CPT

## 2019-10-10 PROCEDURE — 25000003 PHARM REV CODE 250: Performed by: FAMILY MEDICINE

## 2019-10-10 RX ORDER — GABAPENTIN 300 MG/1
300 CAPSULE ORAL ONCE
Status: COMPLETED | OUTPATIENT
Start: 2019-10-10 | End: 2019-10-10

## 2019-10-10 RX ORDER — METHOCARBAMOL 500 MG/1
500 TABLET, FILM COATED ORAL
Status: COMPLETED | OUTPATIENT
Start: 2019-10-10 | End: 2019-10-10

## 2019-10-10 RX ORDER — METHOCARBAMOL 500 MG/1
1000 TABLET, FILM COATED ORAL 3 TIMES DAILY
Qty: 60 TABLET | Refills: 1 | Status: SHIPPED | OUTPATIENT
Start: 2019-10-10 | End: 2019-10-20

## 2019-10-10 RX ORDER — GABAPENTIN 100 MG/1
300 CAPSULE ORAL 3 TIMES DAILY
Qty: 270 CAPSULE | Refills: 1 | Status: SHIPPED | OUTPATIENT
Start: 2019-10-10 | End: 2021-08-29

## 2019-10-10 RX ORDER — METHOCARBAMOL 500 MG/1
1000 TABLET, FILM COATED ORAL 3 TIMES DAILY
Qty: 30 TABLET | Refills: 1 | Status: SHIPPED | OUTPATIENT
Start: 2019-10-10 | End: 2019-10-10 | Stop reason: SDUPTHER

## 2019-10-10 RX ADMIN — GABAPENTIN 300 MG: 300 CAPSULE ORAL at 05:10

## 2019-10-10 RX ADMIN — METHOCARBAMOL 500 MG: 500 TABLET, FILM COATED ORAL at 06:10

## 2019-10-10 NOTE — ED TRIAGE NOTES
Pt presents with back pain onset January 2019 after R shoulder repair and epigastric pain onset 2 days.

## 2019-10-10 NOTE — TELEPHONE ENCOUNTER
Pt c/o pain that feels like its going from upper back to neck. Pt states she now feels like pain is going through to chest. Pt advised per protocol to call 911 and pt verbalizes understanding. Pt refuses to call 911 and states she will go to ED.     Reason for Disposition   Patient sounds very sick or weak to the triager   Chest pain lasting longer than 5 minutes and ANY of the following:* Over 50 years old* Over 30 years old and at least one cardiac risk factor (i.e., high blood pressure, diabetes, high cholesterol, obesity, smoker or strong family history of heart disease)* Pain is crushing, pressure-like, or heavy * Took nitroglycerin and chest pain was not relieved* History of heart disease (i.e., angina, heart attack, bypass surgery, angioplasty, CHF)    Additional Information   Negative: Passed out (i.e., fainted, collapsed and was not responding)   Negative: Shock suspected (e.g., cold/pale/clammy skin, too weak to stand, low BP, rapid pulse)   Negative: Sounds like a life-threatening emergency to the triager   Negative: SEVERE back pain of sudden onset and age > 60   Negative: SEVERE abdominal pain (e.g., excruciating)   Negative: Abdominal pain and age > 60   Negative: Unable to urinate (or only a few drops) and bladder feels very full   Negative: Loss of bladder or bowel control (urine or bowel incontinence; wetting self, leaking stool) of new onset   Negative: Numbness (loss of sensation) in groin or rectal area   Negative: Pain radiates into groin, scrotum   Negative: Blood in urine (red, pink, or tea-colored)   Negative: Vomiting and pain over lower ribs of back (i.e., flank - kidney area)   Negative: Weakness of a leg or foot (e.g., unable to bear weight, dragging foot)   Negative: Passed out (i.e., fainted, collapsed and was not responding)   Negative: Severe difficulty breathing (e.g., struggling for each breath, speaks in single words)    Protocols used: BACK PAIN-A-OH, CHEST  PAIN-A-OH

## 2019-10-10 NOTE — DISCHARGE INSTRUCTIONS
Keep follow-up appointment with pain management.  Follow-up with primary care physician in the next 3-4 weeks for re-evaluation.  Return to ER as needed if pain is not controlled.

## 2019-10-10 NOTE — ED PROVIDER NOTES
Encounter Date: 10/10/2019       History     Chief Complaint   Patient presents with    Chest Pain    Back Pain     Patient comes our facility complaining of right shoulder pain as well right neck pain. Patient stated that she had rotator cuff surgery in January 29th of this year.  Patient states that she went to physical therapy and after passive range of motion exercises he has had chronic shoulder pain and right shoulder since February of this year.  She has been referred to a pain management doctor.  She has been told she has spinal pathology based off an x-ray.  An MRI was then performed.  Since then she states that a Dr. Pardo has performed epidural injections on her, last injection was 2 weeks prior to today.  Patient continues to have right-sided cervical pain that is sharp in nature.  It radiates down to her trapezius muscle.  She did state she had some occasional sharp chest pain radiating to her back.  All these pains are associated with range of motion.  She has increased pain with range of motion of her neck and right shoulder and trapezius muscle.  She has tenderness over chest and rhomboid muscles as well as her trapezius muscles.    The patient has been prescribed opiates as well as tramadol, as well as muscle relaxers, as well as Neurontin.  Patient has refused to take all these except for the occasional tramadol as she was scared of the medications.        Review of patient's allergies indicates:   Allergen Reactions    Adhesive     Ciprofloxacin     Escitalopram oxalate     Pregabalin      Past Medical History:   Diagnosis Date    Diabetes mellitus, type 2     Hypertension     Reflux esophagitis     Seasonal allergies      Past Surgical History:   Procedure Laterality Date    BACK SURGERY      EPIDURAL STEROID INJECTION INTO CERVICAL SPINE N/A 9/30/2019    Procedure: Injection-steroid-epidural-cervical;  Surgeon: Nicholas Pardo MD;  Location: [facility];  Service: Pain Management;   Laterality: N/A;  C7-T1    KNEE SURGERY      ovarian tumor      SHOULDER ARTHROSCOPY       History reviewed. No pertinent family history.  Social History     Tobacco Use    Smoking status: Never Smoker    Smokeless tobacco: Never Used   Substance Use Topics    Alcohol use: Yes     Comment: occasionally    Drug use: No     Review of Systems   Constitutional: Negative.    HENT: Negative for ear pain, postnasal drip, rhinorrhea, sinus pressure, sinus pain and sore throat.    Eyes: Negative for pain and visual disturbance.   Respiratory: Negative.    Cardiovascular: Negative.         Chest pain is musculoskeletal in origin.   Gastrointestinal: Negative.    Endocrine: Negative for cold intolerance and heat intolerance.   Genitourinary: Positive for frequency. Negative for dysuria and flank pain.   Musculoskeletal: Positive for arthralgias, myalgias and neck pain. Negative for back pain, gait problem, joint swelling and neck stiffness.   Skin: Negative.    Allergic/Immunologic: Negative.    Neurological: Negative for dizziness, tremors, seizures, syncope, facial asymmetry, speech difficulty, weakness, light-headedness, numbness and headaches.   Hematological: Negative.    Psychiatric/Behavioral: Negative for agitation, behavioral problems and confusion.       Physical Exam     Initial Vitals [10/10/19 1555]   BP Pulse Resp Temp SpO2   (!) 149/74 104 19 98 °F (36.7 °C) 97 %      MAP       --         Physical Exam    Constitutional: She appears well-developed and well-nourished. She is not diaphoretic. No distress.   HENT:   Head: Normocephalic and atraumatic.   Nose: Nose normal.   Mouth/Throat: Oropharynx is clear and moist.   Eyes: Conjunctivae and EOM are normal. Pupils are equal, round, and reactive to light. Right eye exhibits no discharge. Left eye exhibits no discharge. No scleral icterus.   Neck: Normal range of motion. Neck supple. No thyromegaly present. No tracheal deviation present. No JVD present.    Cardiovascular: Normal rate, regular rhythm, normal heart sounds and intact distal pulses.   No murmur heard.  Pulmonary/Chest: Breath sounds normal. No stridor. No respiratory distress. She has no wheezes. She has no rhonchi. She has no rales. She exhibits tenderness.   Abdominal: Soft. Bowel sounds are normal. She exhibits no distension. There is no tenderness. There is no rebound and no guarding.   Musculoskeletal: Normal range of motion. She exhibits tenderness. She exhibits no edema.   Neurological: She is alert and oriented to person, place, and time. She has normal strength. No cranial nerve deficit or sensory deficit. GCS score is 15. GCS eye subscore is 4. GCS verbal subscore is 5. GCS motor subscore is 6.   Skin: Skin is warm and dry. Capillary refill takes less than 2 seconds. No abscess noted. No erythema.   Psychiatric: She has a normal mood and affect. Her behavior is normal. Judgment and thought content normal.         ED Course   Procedures  Labs Reviewed - No data to display       Imaging Results    None                               Clinical Impression:       ICD-10-CM ICD-9-CM   1. Muscle spasms of neck M62.838 728.85   2. Cervical radiculopathy M54.12 723.4   3. Degenerative disc disease, cervical M50.30 722.4         Disposition:   Disposition: Discharged  Condition: Stable                        Krystian Machado MD  10/10/19 3256

## 2019-10-31 ENCOUNTER — OFFICE VISIT (OUTPATIENT)
Dept: PAIN MEDICINE | Facility: CLINIC | Age: 79
End: 2019-10-31
Payer: MEDICARE

## 2019-10-31 VITALS
RESPIRATION RATE: 17 BRPM | DIASTOLIC BLOOD PRESSURE: 74 MMHG | BODY MASS INDEX: 27.9 KG/M2 | TEMPERATURE: 98 F | HEART RATE: 99 BPM | OXYGEN SATURATION: 95 % | HEIGHT: 68 IN | WEIGHT: 184.06 LBS | SYSTOLIC BLOOD PRESSURE: 135 MMHG

## 2019-10-31 DIAGNOSIS — M50.30 DDD (DEGENERATIVE DISC DISEASE), CERVICAL: ICD-10-CM

## 2019-10-31 DIAGNOSIS — M47.892 OTHER SPONDYLOSIS, CERVICAL REGION: Primary | ICD-10-CM

## 2019-10-31 PROCEDURE — 99214 PR OFFICE/OUTPT VISIT, EST, LEVL IV, 30-39 MIN: ICD-10-PCS | Mod: S$PBB,,, | Performed by: ANESTHESIOLOGY

## 2019-10-31 PROCEDURE — 99214 OFFICE O/P EST MOD 30 MIN: CPT | Mod: PBBFAC,PO | Performed by: ANESTHESIOLOGY

## 2019-10-31 PROCEDURE — 99999 PR PBB SHADOW E&M-EST. PATIENT-LVL IV: ICD-10-PCS | Mod: PBBFAC,,, | Performed by: ANESTHESIOLOGY

## 2019-10-31 PROCEDURE — 99999 PR PBB SHADOW E&M-EST. PATIENT-LVL IV: CPT | Mod: PBBFAC,,, | Performed by: ANESTHESIOLOGY

## 2019-10-31 PROCEDURE — 99214 OFFICE O/P EST MOD 30 MIN: CPT | Mod: S$PBB,,, | Performed by: ANESTHESIOLOGY

## 2019-10-31 RX ORDER — TRAMADOL HYDROCHLORIDE 50 MG/1
50 TABLET ORAL DAILY PRN
Qty: 30 TABLET | Refills: 0 | Status: SHIPPED | OUTPATIENT
Start: 2019-10-31 | End: 2020-06-03 | Stop reason: SDUPTHER

## 2019-10-31 NOTE — PROGRESS NOTES
FOLLOW UP NOTE:     CHIEF COMPLAINT: neck pain    INITIAL HISTORY OF PRESENT ILLNESS: Brittney Freitas is a 79 y.o. female with PMH significant for hx of right rotator cuff repair (1/2019), ITP (sees Hematologist in [place]), HTN, and GERD presents for the evaluation of neck pain. Neck pain began < 1 year ago. Patient cannot recall neck pain prior to her right rotator cuff surgery. Patient does endorse of intermittent falls sporadically but cannot identify any specific inciting event to her trauma. Patient localizes her neck pain to the right side of her neck. The patient denies of radiation of her pain from her neck. Patient denies of any of numbness or tingling sensation. Patient reports that her pain is a 8-9/10. Patient reports that her pain is constant, burning and sharp pain. Patient denies of any urinary/fecal incontinence, saddle anesthesia, or recent falls.      Aggravating factors: extension of the neck and lateral rotation      Mitigating factors: neck injection at [facility] years ago.      Relevant Surgeries: yes; right rotator cuff repair; hx of lumbar surgery     INTERVAL HISTORY OF PRESENT ILLNESS: Brittney Freitas is a 79 y.o. female with PMH significant for hx of right rotator cuff repair (1/2019), ITP (sees Hematologist in [place]), HTN, and GERD presents as an established patient for the continued management of neck pain. Patient is s/p C7-T1 cervical interlaminar epidural steroid injection on 9/30/2019, and she reports of improvement of some aspects of her neck pain (initial pain score during initial consultation was a 8-9/10 and now it is a 5/10). Patient reports of right sided neck pain today with associated muscle spasms. Patient denies of radiation of her pain. Since her injection, patient did present to the ED for chest pain but did not require admission or new medications. Patient denies of any significant interval changes in her health otherwise.  Patient denies of any  "urinary/fecal incontinence, saddle anesthesia, or weakness.     INTERVENTIONAL PAIN HISTORY:  9/30/2019: C7-T1 cervical interlaminar epidural steroid injection - good relief     CURRENT PAIN MEDICATIONS:   Ultram (Tramadol HCL) PRN  duloxetine 60 mg PO daily    ROS:  Review of Systems   Constitutional: Negative for chills and fever.   HENT: Negative for sore throat.    Eyes: Negative for visual disturbance.   Respiratory: Negative for shortness of breath.    Cardiovascular: Negative for chest pain.   Gastrointestinal: Negative for nausea and vomiting.   Genitourinary: Negative for difficulty urinating.   Musculoskeletal: Positive for neck pain.   Skin: Negative for rash.   Allergic/Immunologic: Negative for immunocompromised state.   Neurological: Negative for syncope.   Hematological: Does not bruise/bleed easily.   Psychiatric/Behavioral: Negative for suicidal ideas.        MEDICAL, SURGICAL, FAMILY, SOCIAL HX: reviewed    MEDICATIONS/ALLERGIES: reviewed    PHYSICAL EXAM:    VITALS: Vitals reviewed.   Vitals:    10/31/19 0909   Resp: 17   Weight: 83.5 kg (184 lb 1.4 oz)   Height: 5' 8" (1.727 m)       Physical Exam   Constitutional: She is oriented to person, place, and time and well-developed, well-nourished, and in no distress.   HENT:   Head: Normocephalic and atraumatic.   Eyes: Conjunctivae and EOM are normal. Right eye exhibits no discharge. Left eye exhibits no discharge.   Cardiovascular: Normal rate.   Pulmonary/Chest: Effort normal and breath sounds normal. No respiratory distress.   Abdominal: Soft.   Neurological: She is alert and oriented to person, place, and time.   Skin: Skin is warm and dry. No rash noted. She is not diaphoretic.   Psychiatric: Mood, memory, affect and judgment normal.   Nursing note and vitals reviewed.     UPPER EXTREMITIES: Normal alignment, normal range of motion, no atrophy, no skin changes,  hair growth and nail growth normal and equal bilaterally. No swelling, no " tenderness.    LOWER EXTREMITIES:  Normal alignment, normal range of motion, no atrophy, no skin changes,  hair growth and nail growth normal and equal bilaterally. No swelling, no tenderness.     CERVICAL SPINE:  Cervical spine: Limited ROM is in flexion, extension and lateral rotation with increased pain.  ((+)) Spurling's maneuver bilaterally for neck pain but not radicular pain  Myofascial exam: Tenderness to palpation across cervical paraspinous region bilaterally.     CRANIAL NERVES:  II:         PERRL bilaterally,   III,IV,VI: EOMI.    V:         Facial sensation equal bilaterally  VII:       Facial motor function normal.  VIII:      Hearing equal to finger rub bilaterally  IX/X:    Gag normal, palate symmetric  XI:        Shoulder shrug equal, head turn equal  XII:       Tongue midline without fasciculations        MOTOR: Tone and bulk: normal bilateral upper and lower Strength: normal   Delt      Bi         Tri        WE      WF                        R          5          5          5          5          5          5            L          5          5          5          5          5          5               IP         ADD     ABD     Quad   TA        Gas      HAM  R          5          5          5          5          5          5          5  L          5          5          5          5          5          5          5     SENSATION: Decreased sensation in the C8 distribution of the right hand.   REFLEXES: normal, symmetric, nonbrisk.  Toes down, no clonus. Negative clark's sign bilaterally.  GAIT: normal rise, base, steps, and arm swing.        IMAGING:  OS Cervical MRI without contrast report (4/10/2019):  C2/C3: Disc desiccation. No disc herniation. The AP canal diameter measures 10.8 mm. Uncovertebral joint osteophytosis and facet joint arthrosis cause mild left foraminal stenosis. Minimal right foraminal stenosis.   C3/C4: Mild disc space narrowing. Mild annular disc bulge. Uncovertebral  joint osteophytosis, severe facet joint arthrosis and ligamentum flavum hypertrophy. Severe right-sided facet joint arthrosis with facet joint marrow edema and a facet joint effusion. Partial circumferential effacement of the dural sac with a narrowed AP canal diameter measuring 8.4 mm. Severe bilateral foraminal stenosis.   C4/C5: Mild disc space narrowing. Mild annular disc bulge. Small central disc protrusion. Uncovertebral joint osteophytosis, advanced facet joint arthrosis and ligamentum flavum hypertrophy. Partial circumferential effacement of the dural sac with a narrowed AP canal diameter measuring 8.6 mm. Moderate-to-severe bilateral foraminal stenosis.   C5/C6: Severe disc space narrowing. Mild annular disc bulge. Uncovertebral joint osteophytosis, advanced facet joint arthrosis and ligamentum flavum hypertrophy. Partial circumferential effacement of the dural sac with a narrowed AP canal diameter measuring 8.5 mm. Severe right foraminal stenosis. Moderate-to-severe left foraminal stenosis.   C6/C7: Severe disc space narrowing. Mild annular disc bulge. Small central disc protrusion. Uncovertebral joint osteophytosis, advanced facet joint arthrosis and ligamentum flavum hypertrophy. Partial circumferential effacement of the dural sac with a narrowed AP canal diameter measuring 9.2 mm. Severe bilateral foraminal stenosis.   C7/T1: Mild disc space narrowing. Minimal central disc protrusion. The AP canal diameter measures 11.2 mm. Uncovertebral joint osteophytes and facet joint arthrosis. Patent neural foramen.       ASSESSMENT: Brittney Freitas is a 79 y.o. female with PMH significant for hx of right rotator cuff repair (1/2019), ITP (sees Hematologist in [place]), HTN, and GERD presents as an established patient for the continued management of neck pain. Patient is s/p C7-T1 cervical interlaminar epidural steroid injection on 9/30/2019, and she reports of improvement of her neck pain. Today, patient  complaining of right sided neck pain without radiation. I suspect that the patient had improvement with her last epidural steroid injection and is now experiencing facetogenic pain. Treatment plan outlined below.     PLAN:  1. Schedule for right C4, C5, C6 medial branch blocks to treat cervical facet arthropathy  2. Prescribed Tramadol 50 mg PO q day PRN for breakthrough pain; #30 tablets; 0 refills  3. RTC for the procedure as outlined above     Nicholas Pardo MD  Pain Medicine

## 2019-12-12 ENCOUNTER — TELEPHONE (OUTPATIENT)
Dept: PAIN MEDICINE | Facility: CLINIC | Age: 79
End: 2019-12-12

## 2019-12-12 NOTE — TELEPHONE ENCOUNTER
Patient came into office, reported drastic improvement in pain since injection. Advised patient to contact office if pain returns. Patient voiced understanding.

## 2020-01-29 ENCOUNTER — OFFICE VISIT (OUTPATIENT)
Dept: PAIN MEDICINE | Facility: CLINIC | Age: 80
End: 2020-01-29
Payer: MEDICARE

## 2020-01-29 VITALS
BODY MASS INDEX: 29.83 KG/M2 | SYSTOLIC BLOOD PRESSURE: 157 MMHG | WEIGHT: 190.06 LBS | HEIGHT: 67 IN | HEART RATE: 102 BPM | OXYGEN SATURATION: 97 % | DIASTOLIC BLOOD PRESSURE: 82 MMHG

## 2020-01-29 DIAGNOSIS — M47.892 OTHER SPONDYLOSIS, CERVICAL REGION: ICD-10-CM

## 2020-01-29 DIAGNOSIS — M50.30 DDD (DEGENERATIVE DISC DISEASE), CERVICAL: Primary | ICD-10-CM

## 2020-01-29 PROCEDURE — 99999 PR PBB SHADOW E&M-EST. PATIENT-LVL IV: ICD-10-PCS | Mod: PBBFAC,,, | Performed by: ANESTHESIOLOGY

## 2020-01-29 PROCEDURE — 1125F PR PAIN SEVERITY QUANTIFIED, PAIN PRESENT: ICD-10-PCS | Mod: ,,, | Performed by: ANESTHESIOLOGY

## 2020-01-29 PROCEDURE — 99999 PR PBB SHADOW E&M-EST. PATIENT-LVL IV: CPT | Mod: PBBFAC,,, | Performed by: ANESTHESIOLOGY

## 2020-01-29 PROCEDURE — 99214 PR OFFICE/OUTPT VISIT, EST, LEVL IV, 30-39 MIN: ICD-10-PCS | Mod: S$PBB,,, | Performed by: ANESTHESIOLOGY

## 2020-01-29 PROCEDURE — 1159F PR MEDICATION LIST DOCUMENTED IN MEDICAL RECORD: ICD-10-PCS | Mod: ,,, | Performed by: ANESTHESIOLOGY

## 2020-01-29 PROCEDURE — 99214 OFFICE O/P EST MOD 30 MIN: CPT | Mod: S$PBB,,, | Performed by: ANESTHESIOLOGY

## 2020-01-29 PROCEDURE — 1159F MED LIST DOCD IN RCRD: CPT | Mod: ,,, | Performed by: ANESTHESIOLOGY

## 2020-01-29 PROCEDURE — 99214 OFFICE O/P EST MOD 30 MIN: CPT | Mod: PBBFAC,PO | Performed by: ANESTHESIOLOGY

## 2020-01-29 PROCEDURE — 1125F AMNT PAIN NOTED PAIN PRSNT: CPT | Mod: ,,, | Performed by: ANESTHESIOLOGY

## 2020-01-29 RX ORDER — TRAMADOL HYDROCHLORIDE 50 MG/1
50 TABLET ORAL EVERY 8 HOURS PRN
Qty: 30 TABLET | Refills: 0 | Status: ON HOLD | OUTPATIENT
Start: 2020-01-29 | End: 2020-11-13 | Stop reason: HOSPADM

## 2020-01-29 NOTE — PROGRESS NOTES
FOLLOW UP NOTE:     CHIEF COMPLAINT: neck pain    INITIAL HISTORY OF PRESENT ILLNESS: Brittney Freitas is a 79 y.o. female with PMH significant for hx of right rotator cuff repair (1/2019), ITP (sees Hematologist in [place]), HTN, and GERD presents for the evaluation of neck pain. Neck pain began < 1 year ago. Patient cannot recall neck pain prior to her right rotator cuff surgery. Patient does endorse of intermittent falls sporadically but cannot identify any specific inciting event to her trauma. Patient localizes her neck pain to the right side of her neck. The patient denies of radiation of her pain from her neck. Patient denies of any of numbness or tingling sensation. Patient reports that her pain is a 8-9/10. Patient reports that her pain is constant, burning and sharp pain. Patient denies of any urinary/fecal incontinence, saddle anesthesia, or recent falls.      Aggravating factors: extension of the neck and lateral rotation      Mitigating factors: neck injection at [facility] years ago.      Relevant Surgeries: yes; right rotator cuff repair; hx of lumbar surgery     INTERVAL HISTORY OF PRESENT ILLNESS: Brittney Freitas is a 79 y.o. female with PMH significant for hx of right rotator cuff repair (1/2019), ITP (sees Hematologist in [place]), HTN, and GERD presents as an established patient for the continued management of neck pain. The patient denies of any significant changes in her health since her last appointment. Patient reports of a mild recurrence of her neck pain after no inciting incident or trauma but the patient does not feel as though she requires a repeat injection at this time. The patient continues to localizes the pain to the right side of her neck. Patient reports that her current pain is a 3/10. Patient denies of any urinary/fecal incontinence, saddle anesthesia, or weakness.     INTERVENTIONAL PAIN HISTORY:  9/30/2019: C7-T1 cervical interlaminar epidural steroid  "injection - good relief     CURRENT PAIN MEDICATIONS:   Tramadol 50 mg PO q day PRN   duloxetine 60 mg PO daily    ROS:  Review of Systems   Constitutional: Negative for chills and fever.   HENT: Negative for sore throat.    Eyes: Negative for visual disturbance.   Respiratory: Negative for shortness of breath.    Cardiovascular: Negative for chest pain.   Gastrointestinal: Negative for nausea and vomiting.   Genitourinary: Negative for difficulty urinating.   Musculoskeletal: Positive for neck pain.   Skin: Negative for rash.   Allergic/Immunologic: Negative for immunocompromised state.   Neurological: Negative for syncope.   Hematological: Does not bruise/bleed easily.   Psychiatric/Behavioral: Negative for suicidal ideas.        MEDICAL, SURGICAL, FAMILY, SOCIAL HX: reviewed    MEDICATIONS/ALLERGIES: reviewed    PHYSICAL EXAM:    VITALS: Vitals reviewed.   Vitals:    01/29/20 1356   BP: (!) 157/82   Pulse: 102   SpO2: 97%   Weight: 86.2 kg (190 lb 0.6 oz)   Height: 5' 7" (1.702 m)   PainSc:   3   PainLoc: Neck       Physical Exam   Constitutional: She is oriented to person, place, and time and well-developed, well-nourished, and in no distress.   HENT:   Head: Normocephalic and atraumatic.   Eyes: Conjunctivae and EOM are normal. Right eye exhibits no discharge. Left eye exhibits no discharge.   Cardiovascular: Normal rate.   Pulmonary/Chest: Effort normal and breath sounds normal. No respiratory distress.   Abdominal: Soft.   Neurological: She is alert and oriented to person, place, and time.   Skin: Skin is warm and dry. No rash noted. She is not diaphoretic.   Psychiatric: Mood, memory, affect and judgment normal.   Nursing note and vitals reviewed.       UPPER EXTREMITIES: Normal alignment, normal range of motion, no atrophy, no skin changes,  hair growth and nail growth normal and equal bilaterally. No swelling, no tenderness.    LOWER EXTREMITIES:  Normal alignment, normal range of motion, no atrophy, no " skin changes,  hair growth and nail growth normal and equal bilaterally. No swelling, no tenderness.     CERVICAL SPINE:  Cervical spine: Limited ROM is in flexion, extension and lateral rotation with increased pain.  ((+)) Spurling's maneuver bilaterally for neck pain but not radicular pain  Myofascial exam: Tenderness to palpation across cervical paraspinous region bilaterally.    MOTOR: Tone and bulk: normal bilateral upper and lower Strength: normal   Delt      Bi         Tri        WE      WF                        R          5          5          5          5          5          5            L          5          5          5          5          5          5               IP         ADD     ABD     Quad   TA        Gas      HAM  R          5          5          5          5          5          5          5  L          5          5          5          5          5          5          5     SENSATION: Decreased sensation in the C8 distribution of the right hand.   REFLEXES: normal, symmetric, nonbrisk.  Toes down, no clonus. Negative clark's sign bilaterally.  GAIT: normal rise, base, steps, and arm swing.      IMAGING: no new imaging to review    ASSESSMENT:  Brittney Freitas is a 79 y.o. female with PMH significant for hx of right rotator cuff repair (1/2019), ITP (sees Hematologist in [place]), HTN, and GERD presents as an established patient for the continued management of neck pain. Patient presents today for follow-up. Chronic neck pain recurred but not as severe as it was during her initial consultation. Treatment plan outlined below.     PLAN:  1. Patient can call and schedule for a repeat C7-T1 cervical interlaminar epidural steroid injection in the future if her pain worsens.   2. Refilled Tramadol 50 mg PO q day PRN for breakthrough pain; #30 tablets; 0 refills  3. I have stressed the importance of physical activity and a home exercise plan to help with chronic pain and improve  health.  4. RTC in 3 months or sooner as needed     Nicholas Pardo MD  Pain Management

## 2020-05-28 ENCOUNTER — TELEPHONE (OUTPATIENT)
Dept: PODIATRY | Facility: CLINIC | Age: 80
End: 2020-05-28

## 2020-05-28 NOTE — TELEPHONE ENCOUNTER
----- Message from Leigh Collins MA sent at 5/28/2020 11:08 AM CDT -----  Contact: patient  Type: Needs Medical Advice  Who Called:  Brittneykellie Truong Call Back Number:   Additional Information: patient states she was prescribed a cream to use on her toe for pain.  Please call to discuss

## 2020-06-02 ENCOUNTER — OFFICE VISIT (OUTPATIENT)
Dept: PODIATRY | Facility: CLINIC | Age: 80
End: 2020-06-02
Payer: MEDICARE

## 2020-06-02 ENCOUNTER — TELEPHONE (OUTPATIENT)
Dept: PODIATRY | Facility: CLINIC | Age: 80
End: 2020-06-02

## 2020-06-02 VITALS
WEIGHT: 190 LBS | DIASTOLIC BLOOD PRESSURE: 76 MMHG | TEMPERATURE: 98 F | HEIGHT: 67 IN | HEART RATE: 90 BPM | SYSTOLIC BLOOD PRESSURE: 135 MMHG | BODY MASS INDEX: 29.82 KG/M2

## 2020-06-02 DIAGNOSIS — M79.672 PAIN IN BOTH FEET: ICD-10-CM

## 2020-06-02 DIAGNOSIS — M72.2 PLANTAR FASCIITIS, RIGHT: Primary | ICD-10-CM

## 2020-06-02 DIAGNOSIS — G57.62 PLANTAR NEUROMA, LEFT: ICD-10-CM

## 2020-06-02 DIAGNOSIS — M79.671 PAIN IN BOTH FEET: ICD-10-CM

## 2020-06-02 DIAGNOSIS — E11.9 CONTROLLED TYPE 2 DIABETES MELLITUS WITHOUT COMPLICATION, WITHOUT LONG-TERM CURRENT USE OF INSULIN: ICD-10-CM

## 2020-06-02 PROCEDURE — 99213 OFFICE O/P EST LOW 20 MIN: CPT | Mod: PBBFAC | Performed by: PODIATRIST

## 2020-06-02 PROCEDURE — 99214 PR OFFICE/OUTPT VISIT, EST, LEVL IV, 30-39 MIN: ICD-10-PCS | Mod: S$PBB,,, | Performed by: PODIATRIST

## 2020-06-02 PROCEDURE — 99999 PR PBB SHADOW E&M-EST. PATIENT-LVL III: ICD-10-PCS | Mod: PBBFAC,,, | Performed by: PODIATRIST

## 2020-06-02 PROCEDURE — 99999 PR PBB SHADOW E&M-EST. PATIENT-LVL III: CPT | Mod: PBBFAC,,, | Performed by: PODIATRIST

## 2020-06-02 PROCEDURE — 99214 OFFICE O/P EST MOD 30 MIN: CPT | Mod: S$PBB,,, | Performed by: PODIATRIST

## 2020-06-02 RX ORDER — FLUTICASONE PROPIONATE 50 MCG
SPRAY, SUSPENSION (ML) NASAL
COMMUNITY
Start: 2018-09-14

## 2020-06-02 RX ORDER — DICLOFENAC SODIUM 10 MG/G
2 GEL TOPICAL 3 TIMES DAILY
Qty: 200 G | Refills: 2 | Status: SHIPPED | OUTPATIENT
Start: 2020-06-02 | End: 2022-11-10 | Stop reason: SDUPTHER

## 2020-06-02 NOTE — LETTER
June 5, 2020      Andry Byrnes MD  [address] [place] [address]           [facility]  [address] [place] [address]  Phone: [phone]  Fax: [phone]          Patient: Brittney Freitas   MR Number: [id number]   YOB: 1940   Date of Visit: 6/2/2020       Dear Dr. Andry Byrnes:    Thank you for referring Brittney Freitas to me for evaluation. Attached you will find relevant portions of my assessment and plan of care.    If you have questions, please do not hesitate to call me. I look forward to following Brittney Freitas along with you.    Sincerely,    Erin Wilcox, DPSINA    Enclosure  CC:  No Recipients    If you would like to receive this communication electronically, please contact [email or web address] or [phone] to request more information on [employer] access.    For providers and/or their staff who would like to refer a patient to [facility], please contact us through our one-stop-shop provider referral line, [facility] , at [phone].    If you feel you have received this communication in error or would no longer like to receive these types of communications, please e-mail [email or web address]

## 2020-06-02 NOTE — TELEPHONE ENCOUNTER
----- Message from Norma Friedman sent at 6/2/2020  2:21 PM CDT -----  Patient is calling because she forgot to get the shoe insert before she left the clinic.  She wants to know if you have her size, 11.  Please call her at [phone].  Thank you!

## 2020-06-03 ENCOUNTER — OFFICE VISIT (OUTPATIENT)
Dept: PAIN MEDICINE | Facility: CLINIC | Age: 80
End: 2020-06-03
Payer: MEDICARE

## 2020-06-03 VITALS
WEIGHT: 193.13 LBS | HEART RATE: 78 BPM | RESPIRATION RATE: 18 BRPM | OXYGEN SATURATION: 96 % | HEIGHT: 67 IN | SYSTOLIC BLOOD PRESSURE: 116 MMHG | BODY MASS INDEX: 30.31 KG/M2 | DIASTOLIC BLOOD PRESSURE: 74 MMHG

## 2020-06-03 DIAGNOSIS — M50.30 DDD (DEGENERATIVE DISC DISEASE), CERVICAL: Primary | ICD-10-CM

## 2020-06-03 DIAGNOSIS — M47.892 OTHER SPONDYLOSIS, CERVICAL REGION: ICD-10-CM

## 2020-06-03 DIAGNOSIS — Z01.818 PRE-OP TESTING: ICD-10-CM

## 2020-06-03 DIAGNOSIS — M50.30 DEGENERATIVE DISC DISEASE, CERVICAL: Primary | ICD-10-CM

## 2020-06-03 PROCEDURE — 99214 PR OFFICE/OUTPT VISIT, EST, LEVL IV, 30-39 MIN: ICD-10-PCS | Mod: S$PBB,,, | Performed by: ANESTHESIOLOGY

## 2020-06-03 PROCEDURE — 99214 OFFICE O/P EST MOD 30 MIN: CPT | Mod: S$PBB,,, | Performed by: ANESTHESIOLOGY

## 2020-06-03 PROCEDURE — 99215 OFFICE O/P EST HI 40 MIN: CPT | Mod: PBBFAC,PO | Performed by: ANESTHESIOLOGY

## 2020-06-03 PROCEDURE — 99999 PR PBB SHADOW E&M-EST. PATIENT-LVL V: CPT | Mod: PBBFAC,,, | Performed by: ANESTHESIOLOGY

## 2020-06-03 PROCEDURE — 99999 PR PBB SHADOW E&M-EST. PATIENT-LVL V: ICD-10-PCS | Mod: PBBFAC,,, | Performed by: ANESTHESIOLOGY

## 2020-06-03 RX ORDER — ACETAMINOPHEN AND CODEINE PHOSPHATE 300; 30 MG/1; MG/1
1 TABLET ORAL DAILY PRN
Qty: 30 TABLET | Refills: 0 | Status: SHIPPED | OUTPATIENT
Start: 2020-06-03 | End: 2020-07-03

## 2020-06-03 NOTE — PROGRESS NOTES
FOLLOW UP NOTE:     CHIEF COMPLAINT: neck pain    INITIAL HISTORY OF PRESENT ILLNESS: Brittney Freitas is a 79 y.o. female with PMH significant for hx of right rotator cuff repair (1/2019), ITP (sees Hematologist in [place]), HTN, and GERD presents for the evaluation of neck pain. Neck pain began < 1 year ago. Patient cannot recall neck pain prior to her right rotator cuff surgery. Patient does endorse of intermittent falls sporadically but cannot identify any specific inciting event to her trauma. Patient localizes her neck pain to the right side of her neck. The patient denies of radiation of her pain from her neck. Patient denies of any of numbness or tingling sensation. Patient reports that her pain is a 8-9/10. Patient reports that her pain is constant, burning and sharp pain. Patient denies of any urinary/fecal incontinence, saddle anesthesia, or recent falls.      Aggravating factors: extension of the neck and lateral rotation      Mitigating factors: neck injection at [facility] years ago.      Relevant Surgeries: yes; right rotator cuff repair; hx of lumbar surgery     INTERVAL HISTORY OF PRESENT ILLNESS: Brittney Freitas is a 79 y.o. female with PMH significant for hx of right rotator cuff repair (1/2019), ITP (sees Hematologist in [place]), HTN, and GERD presents as an established patient for the continued management of neck pain. The patient denies of any significant changes in her health since her last appointment. The patient reports of a recurrence of her neck pain as she is taking care of her grand children on a semi-regular basis. The patient reports that her pain previously responded well to the C7-T1 cervical interlaminar epidural steroid injection performed on 9/30/2018. The patient localizes her pain to her lower neck with radiation to her shoulders. Patient denies of any urinary/fecal incontinence, saddle anesthesia, or weakness.     INTERVENTIONAL PAIN  "HISTORY:  9/30/2019: C7-T1 cervical interlaminar epidural steroid injection - good relief     CURRENT PAIN MEDICATIONS:   Tramadol 50 mg PO q day PRN - no benefit  duloxetine 60 mg PO daily    ROS:  Review of Systems   Constitutional: Negative for chills and fever.   HENT: Negative for sore throat.    Eyes: Negative for visual disturbance.   Respiratory: Negative for shortness of breath.    Cardiovascular: Negative for chest pain.   Gastrointestinal: Negative for nausea and vomiting.   Genitourinary: Negative for difficulty urinating.   Musculoskeletal: Positive for myalgias and neck pain.   Skin: Negative for rash.   Allergic/Immunologic: Negative for immunocompromised state.   Neurological: Negative for syncope.   Hematological: Does not bruise/bleed easily.   Psychiatric/Behavioral: Negative for suicidal ideas.        MEDICAL, SURGICAL, FAMILY, SOCIAL HX: reviewed    MEDICATIONS/ALLERGIES: reviewed    PHYSICAL EXAM:    VITALS: Vitals reviewed.   Vitals:    06/03/20 1441   BP: 116/74   Pulse: 78   Resp: 18   SpO2: 96%   Weight: 87.6 kg (193 lb 2 oz)   Height: 5' 7" (1.702 m)   PainSc:   3       Physical Exam   Constitutional: She is oriented to person, place, and time and well-developed, well-nourished, and in no distress.   HENT:   Head: Normocephalic and atraumatic.   Eyes: Conjunctivae and EOM are normal. Right eye exhibits no discharge. Left eye exhibits no discharge.   Cardiovascular: Normal rate.   Pulmonary/Chest: Effort normal and breath sounds normal. No respiratory distress.   Abdominal: Soft.   Neurological: She is alert and oriented to person, place, and time.   Skin: Skin is warm and dry. No rash noted. She is not diaphoretic.   Psychiatric: Mood, memory, affect and judgment normal.   Nursing note and vitals reviewed.     UPPER EXTREMITIES: Normal alignment, normal range of motion, no atrophy, no skin changes,  hair growth and nail growth normal and equal bilaterally. No swelling, no tenderness. "    LOWER EXTREMITIES:  Normal alignment, normal range of motion, no atrophy, no skin changes,  hair growth and nail growth normal and equal bilaterally. No swelling, no tenderness.     CERVICAL SPINE:  Cervical spine: Limited ROM is in flexion, extension and lateral rotation with increased pain.  ((+)) Spurling's maneuver bilaterally for neck pain but not radicular pain  Myofascial exam: Tenderness to palpation across cervical paraspinous region bilaterally.     MOTOR: Tone and bulk: normal bilateral upper and lower Strength: normal   Delt      Bi         Tri        WE      WF                        R          5          5          5          5          5          5            L          5          5          5          5          5          5               IP         ADD     ABD     Quad   TA        Gas      HAM  R          5          5          5          5          5          5          5  L          5          5          5          5          5          5          5     SENSATION: Decreased sensation in the C8 distribution of the right hand.   REFLEXES: normal, symmetric, nonbrisk.  Toes down, no clonus. Negative clark's sign bilaterally.  GAIT: normal rise, base, steps, and arm swing.      IMAGING: no new imaging to review    ASSESSMENT: Brittney Freitas is a 79 y.o. female with PMH significant for hx of right rotator cuff repair (1/2019), ITP (sees Hematologist in [place]), HTN, and GERD presents as an established patient for the continued management of neck pain. The patient reports of a recurrence of her neck pain after no inciting incident or trauma. The patient's pain previously responded well to a C7-T1 cervical interlaminar epidural steroid injection. No benefit from Tramadol for breakthrough pain per patient. Treatment plan outlined below.     PLAN:  1. Schedule for repeat C7-T1 cervical interlaminar epidural steroid injection   2. Prescribe Tylenol #3 to take PRN; #30 tablets; 0  refills  3. I have stressed the importance of physical activity and a home exercise plan to help with chronic pain and improve health.  4. RTC for the procedure as outlined above    Nicholas Pardo MD  Pain Management

## 2020-06-03 NOTE — H&P (VIEW-ONLY)
FOLLOW UP NOTE:     CHIEF COMPLAINT: neck pain    INITIAL HISTORY OF PRESENT ILLNESS: Brittney Freitas is a 79 y.o. female with PMH significant for hx of right rotator cuff repair (1/2019), ITP (sees Hematologist in [place]), HTN, and GERD presents for the evaluation of neck pain. Neck pain began < 1 year ago. Patient cannot recall neck pain prior to her right rotator cuff surgery. Patient does endorse of intermittent falls sporadically but cannot identify any specific inciting event to her trauma. Patient localizes her neck pain to the right side of her neck. The patient denies of radiation of her pain from her neck. Patient denies of any of numbness or tingling sensation. Patient reports that her pain is a 8-9/10. Patient reports that her pain is constant, burning and sharp pain. Patient denies of any urinary/fecal incontinence, saddle anesthesia, or recent falls.      Aggravating factors: extension of the neck and lateral rotation      Mitigating factors: neck injection at [facility] years ago.      Relevant Surgeries: yes; right rotator cuff repair; hx of lumbar surgery     INTERVAL HISTORY OF PRESENT ILLNESS: Brittney Freitas is a 79 y.o. female with PMH significant for hx of right rotator cuff repair (1/2019), ITP (sees Hematologist in [place]), HTN, and GERD presents as an established patient for the continued management of neck pain. The patient denies of any significant changes in her health since her last appointment. The patient reports of a recurrence of her neck pain as she is taking care of her grand children on a semi-regular basis. The patient reports that her pain previously responded well to the C7-T1 cervical interlaminar epidural steroid injection performed on 9/30/2018. The patient localizes her pain to her lower neck with radiation to her shoulders. Patient denies of any urinary/fecal incontinence, saddle anesthesia, or weakness.     INTERVENTIONAL PAIN  "HISTORY:  9/30/2019: C7-T1 cervical interlaminar epidural steroid injection - good relief     CURRENT PAIN MEDICATIONS:   Tramadol 50 mg PO q day PRN - no benefit  duloxetine 60 mg PO daily    ROS:  Review of Systems   Constitutional: Negative for chills and fever.   HENT: Negative for sore throat.    Eyes: Negative for visual disturbance.   Respiratory: Negative for shortness of breath.    Cardiovascular: Negative for chest pain.   Gastrointestinal: Negative for nausea and vomiting.   Genitourinary: Negative for difficulty urinating.   Musculoskeletal: Positive for myalgias and neck pain.   Skin: Negative for rash.   Allergic/Immunologic: Negative for immunocompromised state.   Neurological: Negative for syncope.   Hematological: Does not bruise/bleed easily.   Psychiatric/Behavioral: Negative for suicidal ideas.        MEDICAL, SURGICAL, FAMILY, SOCIAL HX: reviewed    MEDICATIONS/ALLERGIES: reviewed    PHYSICAL EXAM:    VITALS: Vitals reviewed.   Vitals:    06/03/20 1441   BP: 116/74   Pulse: 78   Resp: 18   SpO2: 96%   Weight: 87.6 kg (193 lb 2 oz)   Height: 5' 7" (1.702 m)   PainSc:   3       Physical Exam   Constitutional: She is oriented to person, place, and time and well-developed, well-nourished, and in no distress.   HENT:   Head: Normocephalic and atraumatic.   Eyes: Conjunctivae and EOM are normal. Right eye exhibits no discharge. Left eye exhibits no discharge.   Cardiovascular: Normal rate.   Pulmonary/Chest: Effort normal and breath sounds normal. No respiratory distress.   Abdominal: Soft.   Neurological: She is alert and oriented to person, place, and time.   Skin: Skin is warm and dry. No rash noted. She is not diaphoretic.   Psychiatric: Mood, memory, affect and judgment normal.   Nursing note and vitals reviewed.     UPPER EXTREMITIES: Normal alignment, normal range of motion, no atrophy, no skin changes,  hair growth and nail growth normal and equal bilaterally. No swelling, no tenderness. "    LOWER EXTREMITIES:  Normal alignment, normal range of motion, no atrophy, no skin changes,  hair growth and nail growth normal and equal bilaterally. No swelling, no tenderness.     CERVICAL SPINE:  Cervical spine: Limited ROM is in flexion, extension and lateral rotation with increased pain.  ((+)) Spurling's maneuver bilaterally for neck pain but not radicular pain  Myofascial exam: Tenderness to palpation across cervical paraspinous region bilaterally.     MOTOR: Tone and bulk: normal bilateral upper and lower Strength: normal   Delt      Bi         Tri        WE      WF                        R          5          5          5          5          5          5            L          5          5          5          5          5          5               IP         ADD     ABD     Quad   TA        Gas      HAM  R          5          5          5          5          5          5          5  L          5          5          5          5          5          5          5     SENSATION: Decreased sensation in the C8 distribution of the right hand.   REFLEXES: normal, symmetric, nonbrisk.  Toes down, no clonus. Negative clark's sign bilaterally.  GAIT: normal rise, base, steps, and arm swing.      IMAGING: no new imaging to review    ASSESSMENT: Brittney Freitas is a 79 y.o. female with PMH significant for hx of right rotator cuff repair (1/2019), ITP (sees Hematologist in [place]), HTN, and GERD presents as an established patient for the continued management of neck pain. The patient reports of a recurrence of her neck pain after no inciting incident or trauma. The patient's pain previously responded well to a C7-T1 cervical interlaminar epidural steroid injection. No benefit from Tramadol for breakthrough pain per patient. Treatment plan outlined below.     PLAN:  1. Schedule for repeat C7-T1 cervical interlaminar epidural steroid injection   2. Prescribe Tylenol #3 to take PRN; #30 tablets; 0  refills  3. I have stressed the importance of physical activity and a home exercise plan to help with chronic pain and improve health.  4. RTC for the procedure as outlined above    Nicholas Pardo MD  Pain Management

## 2020-06-03 NOTE — PROGRESS NOTES
Subjective:       Patient ID: Brittney Freitas is a 79 y.o. female.    Chief Complaint: Follow-up; Foot Problem; and Diabetes Mellitus  Patient presents with complaint pain both heels right greater than left and the front of the left foot around the 1st and 2nd digits.   Patient relates these areas have been slowly progressing with more pain recently.  Reports heel pain is worse in the morning, right more tender and will tingle at times along with occasional sharp shooting pains in the left great.  She has a history type 2 diabetes.  Reports it is well controlled, glucose was 127 this morning.  Last saw patient 11/2/2017      Past Medical History:   Diagnosis Date    Diabetes mellitus, type 2     Hypertension     Reflux esophagitis     Seasonal allergies      Past Surgical History:   Procedure Laterality Date    BACK SURGERY      EPIDURAL STEROID INJECTION INTO CERVICAL SPINE N/A 9/30/2019    Procedure: Injection-steroid-epidural-cervical;  Surgeon: Nicholas Pardo MD;  Location: [facility];  Service: Pain Management;  Laterality: N/A;  C7-T1    KNEE SURGERY      ovarian tumor      SHOULDER ARTHROSCOPY       History reviewed. No pertinent family history.  Social History     Socioeconomic History    Marital status:      Spouse name: Not on file    Number of children: Not on file    Years of education: Not on file    Highest education level: Not on file   Occupational History    Not on file   Social Needs    Financial resource strain: Not on file    Food insecurity:     Worry: Not on file     Inability: Not on file    Transportation needs:     Medical: Not on file     Non-medical: Not on file   Tobacco Use    Smoking status: Never Smoker    Smokeless tobacco: Never Used   Substance and Sexual Activity    Alcohol use: Yes     Comment: occasionally    Drug use: No    Sexual activity: Not on file   Lifestyle    Physical activity:     Days per week: Not on file     Minutes per session:  Not on file    Stress: Not on file   Relationships    Social connections:     Talks on phone: Not on file     Gets together: Not on file     Attends Christianity service: Not on file     Active member of club or organization: Not on file     Attends meetings of clubs or organizations: Not on file     Relationship status: Not on file   Other Topics Concern    Not on file   Social History Narrative    Not on file       Current Outpatient Medications   Medication Sig Dispense Refill    busPIRone (BUSPAR) 10 MG tablet Take 10 mg by mouth 3 (three) times daily.      calcium carbonate (OS-IVIS) 600 mg (1,500 mg) Tab Take 600 mg by mouth 2 (two) times daily with meals.      cetirizine (ZYRTEC) 10 MG tablet Take 10 mg by mouth once daily.      diclofenac (VOLTAREN) 0.1 % ophthalmic solution 1 drop 4 (four) times daily.      ergocalciferol (ERGOCALCIFEROL) 50,000 unit Cap Take 50,000 Units by mouth every 7 days.      escitalopram oxalate (LEXAPRO) 20 MG tablet Take 20 mg by mouth once daily.      evolocumab (REPATHA SURECLICK SUBQ) Inject into the skin.      fluticasone propionate (FLONASE) 50 mcg/actuation nasal spray   See Instructions, USE 1 SPRAY IN EACH NOSTRIL DAILY, gm, 7 Refill(s), # 48, eRx: [facility]      folic acid (FOLVITE) 1 MG tablet Take 1 mg by mouth once daily.      gabapentin (NEURONTIN) 100 MG capsule Take 3 capsules (300 mg total) by mouth 3 (three) times daily. 270 capsule 1    lisinopril (PRINIVIL,ZESTRIL) 20 MG tablet Take 20 mg by mouth once daily.      pantoprazole (PROTONIX) 40 MG tablet Take 40 mg by mouth once daily.      traMADol (ULTRAM) 50 mg tablet Take 50 mg by mouth every 6 (six) hours as needed for Pain.      traMADol (ULTRAM) 50 mg tablet Take 1 tablet (50 mg total) by mouth every 8 (eight) hours as needed for Pain. 30 tablet 0    diclofenac sodium (VOLTAREN) 1 % Gel Apply 2 g topically 3 (three) times daily. 200 g 2     No current facility-administered  "medications for this visit.      Facility-Administered Medications Ordered in Other Visits   Medication Dose Route Frequency Provider Last Rate Last Dose    lactated ringers infusion   Intravenous Continuous Nicholas Pardo MD 25 mL/hr at 09/30/19 1686       Review of patient's allergies indicates:   Allergen Reactions    Adhesive     Ciprofloxacin     Escitalopram oxalate     Pregabalin     Penicillin        Review of Systems   Constitutional: Negative for fever.   HENT: Negative for congestion.    Respiratory: Negative for cough and shortness of breath.    Musculoskeletal: Positive for gait problem.        B/L foot pain    All other systems reviewed and are negative.      Objective:      Vitals:    06/02/20 1330   BP: 135/76   Pulse: 90   Temp: 97.8 °F (36.6 °C)   Weight: 86.2 kg (190 lb)   Height: 5' 7" (1.702 m)     Physical Exam   Constitutional: She appears well-developed and well-nourished. No distress.   Cardiovascular:   Pulses:       Dorsalis pedis pulses are 1+ on the right side, and 1+ on the left side.        Posterior tibial pulses are 1+ on the right side, and 1+ on the left side.   Pulmonary/Chest: Effort normal.   Musculoskeletal:        Right foot: There is decreased range of motion and deformity.        Left foot: There is decreased range of motion and deformity.   Feet:   Right Foot:   Skin Integrity: Negative for erythema or warmth.   Left Foot:   Skin Integrity: Negative for erythema or warmth.   Skin: Capillary refill takes 2 to 3 seconds.   Psychiatric: She has a normal mood and affect.   Nursing note and vitals reviewed.  Vascular         Normal CFT bilateral   No lower extremity edema bilateral   Pedal skin temperature and color are normal bilateral     Integumentary   Bony foot type and structure with lack of fat pad       No pedal edema or erythema   No skin breaks, bruises, abrasions    Neurological   Gross sensation intact, subtle paresthesias 1st, 2nd common plantar digital " nerves left foot, discomfort 1,2, 3rd webspaces left  Mild Tinoco's neuritis right heel    Musculoskeletal   Muscle Strength/Testing and Tone:  Intact for age, normal tone bilateral   Joints, Bones, and Muscles: Pain plantar fascial insertion at calcaneus R>L, medial band plantar fascia B/L  Contracted digits   Ankle joint equinus bilateral   Cavus foot type         Walks well unassisted        Presents in appropriate shoes       Assessment:       1. Plantar fasciitis, right    2. Plantar neuroma, left    3. Controlled type 2 diabetes mellitus without complication, without long-term current use of insulin    4. Pain in both feet        Plan:         VOLTAREN GEL 1% APPLY AS DIRECTED TO FEET       Reviewed plantar fasciitis at length with patient.   Explained this is a strain/sprain of the supportive band through the arch on the bottom of the foot and needs to be supported properly along with treatment for inflammation. Explained bracing of the plantar fascia through appropriate arch support is crucial.  We discussed appropriate arch support and shoes, how to gradually adjust to wearing arch supports comfortably   We reviewed appropriate shoes for indoors, no flat shoes or walking barefoot.    Reviewed stretching at length and advised patient this is an important part of treatment to prevent recurrence.    Reviewed ice/cool therapy and frequency this should be performed.  Reviewed topical anti-inflammatory, prescribed Voltaren gel and explained how to apply daily.  Advised all of the same treatments are beneficial  For neuroma, making sure shoes or wide arch supports fit comfortably with plenty of room for the.  Stretching as beneficial for neuroma as well as I/therapy and Voltaren Gel to the area as directed  Explained the patient following all conservative treatments above she should have significant improvement in a few weeks  We reviewed diabetic education and potential complications as well as appropriate shoes  which benefit her heel and foot pain as well.  Instructed patient to check feet daily and contact the office with any area of concern which has not improved in a few days  Patient was in understanding and agreement with treatment plan.  I counseled the patient on their conditions, implications and medical management.  Instructed patient/family to contact the office with any changes, questions, concerns, worsening of symptoms.   Total face-to-face time, exam, assessment, treatment, discussion, documentation 25 minutes, more than half this time spent on consultation and coordination of care.  Follow up 2 weeks    This note was created using [employer] voice recognition software that occasionally misinterpreted phrases or words.

## 2020-06-15 ENCOUNTER — LAB VISIT (OUTPATIENT)
Dept: FAMILY MEDICINE | Facility: CLINIC | Age: 80
End: 2020-06-15
Payer: MEDICARE

## 2020-06-15 DIAGNOSIS — Z01.818 PRE-OP TESTING: ICD-10-CM

## 2020-06-15 PROCEDURE — U0003 INFECTIOUS AGENT DETECTION BY NUCLEIC ACID (DNA OR RNA); SEVERE ACUTE RESPIRATORY SYNDROME CORONAVIRUS 2 (SARS-COV-2) (CORONAVIRUS DISEASE [COVID-19]), AMPLIFIED PROBE TECHNIQUE, MAKING USE OF HIGH THROUGHPUT TECHNOLOGIES AS DESCRIBED BY CMS-2020-01-R: HCPCS

## 2020-06-17 LAB — SARS-COV-2 RNA RESP QL NAA+PROBE: NOT DETECTED

## 2020-06-18 ENCOUNTER — HOSPITAL ENCOUNTER (OUTPATIENT)
Facility: HOSPITAL | Age: 80
Discharge: HOME | End: 2020-06-18
Attending: ANESTHESIOLOGY | Admitting: ANESTHESIOLOGY
Payer: MEDICARE

## 2020-06-18 VITALS
HEART RATE: 77 BPM | HEIGHT: 67 IN | OXYGEN SATURATION: 93 % | WEIGHT: 189 LBS | BODY MASS INDEX: 29.66 KG/M2 | RESPIRATION RATE: 20 BRPM | DIASTOLIC BLOOD PRESSURE: 72 MMHG | TEMPERATURE: 98 F | SYSTOLIC BLOOD PRESSURE: 126 MMHG

## 2020-06-18 DIAGNOSIS — M50.30 DEGENERATIVE DISC DISEASE, CERVICAL: Primary | ICD-10-CM

## 2020-06-18 LAB — POCT GLUCOSE: 101 MG/DL (ref 70–110)

## 2020-06-18 PROCEDURE — S0020 INJECTION, BUPIVICAINE HYDRO: HCPCS | Performed by: ANESTHESIOLOGY

## 2020-06-18 PROCEDURE — 62321 NJX INTERLAMINAR CRV/THRC: CPT | Mod: ,,, | Performed by: ANESTHESIOLOGY

## 2020-06-18 PROCEDURE — 25000003 PHARM REV CODE 250: Performed by: ANESTHESIOLOGY

## 2020-06-18 PROCEDURE — 62321 NJX INTERLAMINAR CRV/THRC: CPT | Performed by: ANESTHESIOLOGY

## 2020-06-18 PROCEDURE — 25500020 PHARM REV CODE 255: Performed by: ANESTHESIOLOGY

## 2020-06-18 PROCEDURE — 99152 MOD SED SAME PHYS/QHP 5/>YRS: CPT | Performed by: ANESTHESIOLOGY

## 2020-06-18 PROCEDURE — 63600175 PHARM REV CODE 636 W HCPCS: Performed by: ANESTHESIOLOGY

## 2020-06-18 PROCEDURE — 62321 PR INJ CERV/THORAC, W/GUIDANCE: ICD-10-PCS | Mod: ,,, | Performed by: ANESTHESIOLOGY

## 2020-06-18 RX ORDER — METHYLPREDNISOLONE ACETATE 80 MG/ML
INJECTION, SUSPENSION INTRA-ARTICULAR; INTRALESIONAL; INTRAMUSCULAR; SOFT TISSUE
Status: DISCONTINUED | OUTPATIENT
Start: 2020-06-18 | End: 2020-06-18 | Stop reason: HOSPADM

## 2020-06-18 RX ORDER — BUPIVACAINE HYDROCHLORIDE 5 MG/ML
INJECTION, SOLUTION EPIDURAL; INTRACAUDAL
Status: DISCONTINUED | OUTPATIENT
Start: 2020-06-18 | End: 2020-06-18 | Stop reason: HOSPADM

## 2020-06-18 RX ORDER — MIDAZOLAM HYDROCHLORIDE 5 MG/ML
INJECTION INTRAMUSCULAR; INTRAVENOUS
Status: DISCONTINUED | OUTPATIENT
Start: 2020-06-18 | End: 2020-06-18 | Stop reason: HOSPADM

## 2020-06-18 RX ORDER — FENTANYL CITRATE 50 UG/ML
INJECTION, SOLUTION INTRAMUSCULAR; INTRAVENOUS
Status: DISCONTINUED | OUTPATIENT
Start: 2020-06-18 | End: 2020-06-18 | Stop reason: HOSPADM

## 2020-06-18 RX ORDER — LIDOCAINE HYDROCHLORIDE 10 MG/ML
INJECTION INFILTRATION; PERINEURAL
Status: DISCONTINUED | OUTPATIENT
Start: 2020-06-18 | End: 2020-06-18 | Stop reason: HOSPADM

## 2020-06-18 RX ORDER — SODIUM CHLORIDE, SODIUM LACTATE, POTASSIUM CHLORIDE, CALCIUM CHLORIDE 600; 310; 30; 20 MG/100ML; MG/100ML; MG/100ML; MG/100ML
INJECTION, SOLUTION INTRAVENOUS CONTINUOUS
Status: DISCONTINUED | OUTPATIENT
Start: 2020-06-18 | End: 2020-06-18 | Stop reason: HOSPADM

## 2020-06-18 NOTE — DISCHARGE INSTRUCTIONS

## 2020-06-18 NOTE — DISCHARGE SUMMARY
[facility]  Discharge Note  Short Stay    Admit Date: 6/18/2020    Discharge Date and Time: 6/18/2020    Attending Physician: Nicholas Pardo MD     Discharge Provider: Nicholas Pardo    Diagnoses:  Active Hospital Problems    Diagnosis  POA    *Degenerative disc disease, cervical [M50.30]  Yes      Resolved Hospital Problems   No resolved problems to display.       Hospital Course: Cervical interlaminar epidural steroid injection     Discharged Condition: Good    Final Diagnoses:   Active Hospital Problems    Diagnosis  POA    *Degenerative disc disease, cervical [M50.30]  Yes      Resolved Hospital Problems   No resolved problems to display.       Disposition: Home or Self Care    Follow up/Patient Instructions:    Medications:  Reconciled Home Medications:      Medication List      CONTINUE taking these medications    acetaminophen-codeine 300-30mg 300-30 mg Tab  Commonly known as: TYLENOL #3  Take 1 tablet by mouth daily as needed.     busPIRone 10 MG tablet  Commonly known as: BUSPAR  Take 10 mg by mouth 3 (three) times daily.     calcium carbonate 600 mg calcium (1,500 mg) Tab  Commonly known as: OS-IVIS  Take 600 mg by mouth 2 (two) times daily with meals.     cetirizine 10 MG tablet  Commonly known as: ZYRTEC  Take 10 mg by mouth once daily.     * diclofenac 0.1 % ophthalmic solution  Commonly known as: VOLTAREN  1 drop 4 (four) times daily.     * diclofenac sodium 1 % Gel  Commonly known as: VOLTAREN  Apply 2 g topically 3 (three) times daily.     ergocalciferol 50,000 unit Cap  Commonly known as: ERGOCALCIFEROL  Take 50,000 Units by mouth every 7 days.     escitalopram oxalate 20 MG tablet  Commonly known as: LEXAPRO  Take 20 mg by mouth once daily.     fluticasone propionate 50 mcg/actuation nasal spray  Commonly known as: FLONASE  See Instructions, USE 1 SPRAY IN EACH NOSTRIL DAILY, gm, 7 Refill(s), # 48, eRx: [facility]     folic acid 1 MG tablet  Commonly known as:  FOLVITE  Take 1 mg by mouth once daily.     gabapentin 100 MG capsule  Commonly known as: NEURONTIN  Take 3 capsules (300 mg total) by mouth 3 (three) times daily.     lisinopriL 20 MG tablet  Commonly known as: PRINIVIL,ZESTRIL  Take 20 mg by mouth once daily.     PROTONIX 40 MG tablet  Generic drug: pantoprazole  Take 40 mg by mouth once daily.     REPATHA SURECLICK SUBQ  Inject into the skin.     * traMADoL 50 mg tablet  Commonly known as: ULTRAM  Take 50 mg by mouth every 6 (six) hours as needed for Pain.     * traMADoL 50 mg tablet  Commonly known as: ULTRAM  Take 1 tablet (50 mg total) by mouth every 8 (eight) hours as needed for Pain.         * This list has 4 medication(s) that are the same as other medications prescribed for you. Read the directions carefully, and ask your doctor or other care provider to review them with you.              Discharge Procedure Orders   Diet general     Call MD for:  temperature >100.4     Call MD for:  persistent nausea and vomiting     Call MD for:  severe uncontrolled pain     Call MD for:  difficulty breathing, headache or visual disturbances     Call MD for:  redness, tenderness, or signs of infection (pain, swelling, redness, odor or green/yellow discharge around incision site)     Call MD for:  hives     Call MD for:  persistent dizziness or light-headedness     Call MD for:  extreme fatigue        Follow up with MD in 2-3 weeks    Discharge Procedure Orders (must include Diet, Follow-up, Activity):   Discharge Procedure Orders (must include Diet, Follow-up, Activity)   Diet general     Call MD for:  temperature >100.4     Call MD for:  persistent nausea and vomiting     Call MD for:  severe uncontrolled pain     Call MD for:  difficulty breathing, headache or visual disturbances     Call MD for:  redness, tenderness, or signs of infection (pain, swelling, redness, odor or green/yellow discharge around incision site)     Call MD for:  hives     Call MD for:  persistent  dizziness or light-headedness     Call MD for:  extreme fatigue

## 2020-06-18 NOTE — OP NOTE
PROCEDURE DATE: 6/18/2020    PROCEDURE: C7-T1 cervical interlaminar epidural steroid injection under utilizing fluoroscopy.    DIAGNOSIS: Cervical Degenerative Disc Diease; Cervical Radiculitis  POSTOP DIAGNOSIS: SAME    PHYSICIAN: Nicholas Pardo MD    MEDICATIONS INJECTED:  Depo-medrol 80 mg followed by a slow injection of 4 mL sterile, preservative-free normal saline.    LOCAL ANESTHETIC USED: Lidocaine 1%, 4 ml.    SEDATION MEDICATIONS: RN IV sedation    COMPLICATIONS:  none    ESTIMATED BLOOD LOSS: none    TECHNIQUE:  A time-out was taken to identify patient and procedure prior to starting the procedure.  With the patient laying in a prone position with the neck in a mid-flexed forward position, the area was prepped and draped in the usual sterile fashion using ChloraPrep and a fenestrated drape.  The area was determined under AP fluoroscopic guidance.  Local anesthetic was given using a 25-gauge 1.5 inch needle by raising a wheal and then infiltrating ventrally.  A 3.5 inch 20-gauge Touhy needle was introduced under fluoroscopic guidance to meet the lamina of C7.  The needle was then hinged under the lamina then advanced using loss of resistance technique.  Once the tip of the needle was in the desired position, the 2 mL contrast dye Omnipaque was injected to determine placement and no uptake.  The steroid was then injected slowly followed by a slow injection of 4 mL of the sterile preservative-free normal saline.  The patient tolerated the procedure well.    The patient was monitored after the procedure and was given post-procedure and discharge instructions to follow at home. The patient was discharged in a stable condition.

## 2020-06-18 NOTE — INTERVAL H&P NOTE

## 2020-07-01 ENCOUNTER — OFFICE VISIT (OUTPATIENT)
Dept: PAIN MEDICINE | Facility: CLINIC | Age: 80
End: 2020-07-01
Payer: MEDICARE

## 2020-07-01 VITALS
DIASTOLIC BLOOD PRESSURE: 67 MMHG | SYSTOLIC BLOOD PRESSURE: 127 MMHG | BODY MASS INDEX: 29.66 KG/M2 | WEIGHT: 189 LBS | OXYGEN SATURATION: 96 % | HEIGHT: 67 IN | RESPIRATION RATE: 14 BRPM | HEART RATE: 83 BPM | TEMPERATURE: 99 F

## 2020-07-01 DIAGNOSIS — M12.811 ROTATOR CUFF ARTHROPATHY OF RIGHT SHOULDER: ICD-10-CM

## 2020-07-01 DIAGNOSIS — M25.512 CHRONIC PAIN OF BOTH SHOULDERS: ICD-10-CM

## 2020-07-01 DIAGNOSIS — M25.511 CHRONIC PAIN OF BOTH SHOULDERS: ICD-10-CM

## 2020-07-01 DIAGNOSIS — M50.30 DEGENERATIVE DISC DISEASE, CERVICAL: Primary | ICD-10-CM

## 2020-07-01 DIAGNOSIS — G89.29 CHRONIC PAIN OF BOTH SHOULDERS: ICD-10-CM

## 2020-07-01 PROCEDURE — 99999 PR PBB SHADOW E&M-EST. PATIENT-LVL V: ICD-10-PCS | Mod: PBBFAC,,, | Performed by: ANESTHESIOLOGY

## 2020-07-01 PROCEDURE — 99215 OFFICE O/P EST HI 40 MIN: CPT | Mod: PBBFAC,PO | Performed by: ANESTHESIOLOGY

## 2020-07-01 PROCEDURE — 99999 PR PBB SHADOW E&M-EST. PATIENT-LVL V: CPT | Mod: PBBFAC,,, | Performed by: ANESTHESIOLOGY

## 2020-07-01 PROCEDURE — 99214 OFFICE O/P EST MOD 30 MIN: CPT | Mod: S$PBB,,, | Performed by: ANESTHESIOLOGY

## 2020-07-01 PROCEDURE — 99214 PR OFFICE/OUTPT VISIT, EST, LEVL IV, 30-39 MIN: ICD-10-PCS | Mod: S$PBB,,, | Performed by: ANESTHESIOLOGY

## 2020-07-01 RX ORDER — HYDROCODONE BITARTRATE AND ACETAMINOPHEN 5; 325 MG/1; MG/1
1 TABLET ORAL EVERY 12 HOURS PRN
Qty: 60 TABLET | Refills: 0 | Status: ON HOLD | OUTPATIENT
Start: 2020-07-01 | End: 2020-11-13 | Stop reason: HOSPADM

## 2020-07-01 NOTE — PROGRESS NOTES
FOLLOW UP NOTE:     CHIEF COMPLAINT: neck and shoulder pain    INITIAL HISTORY OF PRESENT ILLNESS: Brittney Freitas is a 79 y.o. female with PMH significant for hx of right rotator cuff repair (1/2019), ITP (sees Hematologist in [place]), HTN, and GERD presents for the evaluation of neck pain. Neck pain began < 1 year ago. Patient cannot recall neck pain prior to her right rotator cuff surgery. Patient does endorse of intermittent falls sporadically but cannot identify any specific inciting event to her trauma. Patient localizes her neck pain to the right side of her neck. The patient denies of radiation of her pain from her neck. Patient denies of any of numbness or tingling sensation. Patient reports that her pain is a 8-9/10. Patient reports that her pain is constant, burning and sharp pain. Patient denies of any urinary/fecal incontinence, saddle anesthesia, or recent falls.      Aggravating factors: extension of the neck and lateral rotation      Mitigating factors: neck injection at [facility] years ago.      Relevant Surgeries: yes; right rotator cuff repair; hx of lumbar surgery        INTERVAL HISTORY OF PRESENT ILLNESS: Brittney Freitas is a 79 y.o. female with PMH significant for hx of right rotator cuff repair (1/2019), ITP (sees Hematologist in [place]), HTN, and GERD presents as an established patient for the continued management of neck pain. The patient reports that she has yet to receive any benefit from her recent C7-T1 cervical interlaminar epidural steroid injection performed on 6/18/2020. The patient reports of neck pain at rest and bilateral shoulder pain (R > L). The patient reports that her pain is worsened when she takes care of her grandkids especially when she goes to lift them up. The patient also endorsed of pain while attempting to sew recently. The patient reports of no benefit while taking Tylenol #3. The patient reports that her gabapentin was recently increased to  "300 mg PO QID via her PCP.     INTERVENTIONAL PAIN HISTORY:  6/18/2020: C7-T1 cervical interlaminar epidural steroid injection  9/30/2019: C7-T1 cervical interlaminar epidural steroid injection - good relief     CURRENT PAIN MEDICATIONS:   Tramadol 50 mg PO q day PRN - no benefit  Tylenol #3 - no benefit  duloxetine 60 mg PO daily  Gabapentin 300 mg PO QID via Dr. Byrnes per patient    ROS:  Review of Systems   Constitutional: Negative for chills and fever.   HENT: Negative for sore throat.    Eyes: Negative for visual disturbance.   Respiratory: Negative for shortness of breath.    Cardiovascular: Negative for chest pain.   Gastrointestinal: Negative for nausea and vomiting.   Genitourinary: Negative for difficulty urinating.   Musculoskeletal: Positive for arthralgias and neck pain.   Skin: Negative for rash.   Allergic/Immunologic: Negative for immunocompromised state.   Neurological: Negative for syncope.   Hematological: Does not bruise/bleed easily.   Psychiatric/Behavioral: Negative for suicidal ideas.        MEDICAL, SURGICAL, FAMILY, SOCIAL HX: reviewed    MEDICATIONS/ALLERGIES: reviewed    PHYSICAL EXAM:    VITALS: Vitals reviewed.   Vitals:    07/01/20 0949   BP: 127/67   Pulse: 83   Resp: 14   Temp: 98.7 °F (37.1 °C)   TempSrc: Oral   SpO2: 96%   Weight: 85.7 kg (189 lb)   Height: 5' 7" (1.702 m)   PainSc:   8   PainLoc: Shoulder       Physical Exam   Constitutional: She is oriented to person, place, and time and well-developed, well-nourished, and in no distress.   HENT:   Head: Normocephalic and atraumatic.   Eyes: Conjunctivae and EOM are normal. Right eye exhibits no discharge. Left eye exhibits no discharge.   Cardiovascular: Normal rate.   Pulmonary/Chest: Effort normal and breath sounds normal. No respiratory distress.   Abdominal: Soft.   Neurological: She is alert and oriented to person, place, and time.   Skin: Skin is warm and dry. No rash noted. She is not diaphoretic.   Psychiatric: Mood, " memory, affect and judgment normal.   Nursing note and vitals reviewed.       UPPER EXTREMITIES: Normal alignment, normal range of motion, no atrophy, no skin changes,  hair growth and nail growth normal and equal bilaterally. No swelling, no tenderness. Pain against active abduction on the right shoulder.     LOWER EXTREMITIES:  Normal alignment, normal range of motion, no atrophy, no skin changes,  hair growth and nail growth normal and equal bilaterally. No swelling, no tenderness.     CERVICAL SPINE:  Cervical spine: Limited ROM is in flexion, extension and lateral rotation with increased pain.  ((+)) Spurling's maneuver bilaterally for neck pain but not radicular pain  Myofascial exam: Tenderness to palpation across cervical paraspinous region bilaterally.     MOTOR: Tone and bulk: normal bilateral upper and lower Strength: normal   Delt      Bi         Tri        WE      WF                        R          5          5          5          5          5          5            L          5          5          5          5          5          5               IP         ADD     ABD     Quad   TA        Gas      HAM  R          5          5          5          5          5          5          5  L          5          5          5          5          5          5          5     SENSATION: Decreased sensation in the C8 distribution of the right hand.   REFLEXES: normal, symmetric, nonbrisk.  Toes down, no clonus. Negative clark's sign bilaterally.  GAIT: normal rise, base, steps, and arm swing.      IMAGING: OS Cervical MRI without contrast report (4/10/2019):  C2/C3: Disc desiccation. No disc herniation. The AP canal diameter measures 10.8 mm. Uncovertebral joint osteophytosis and facet joint arthrosis cause mild left foraminal stenosis. Minimal right foraminal stenosis.   C3/C4: Mild disc space narrowing. Mild annular disc bulge. Uncovertebral joint osteophytosis, severe facet joint arthrosis and ligamentum  flavum hypertrophy. Severe right-sided facet joint arthrosis with facet joint marrow edema and a facet joint effusion. Partial circumferential effacement of the dural sac with a narrowed AP canal diameter measuring 8.4 mm. Severe bilateral foraminal stenosis.   C4/C5: Mild disc space narrowing. Mild annular disc bulge. Small central disc protrusion. Uncovertebral joint osteophytosis, advanced facet joint arthrosis and ligamentum flavum hypertrophy. Partial circumferential effacement of the dural sac with a narrowed AP canal diameter measuring 8.6 mm. Moderate-to-severe bilateral foraminal stenosis.   C5/C6: Severe disc space narrowing. Mild annular disc bulge. Uncovertebral joint osteophytosis, advanced facet joint arthrosis and ligamentum flavum hypertrophy. Partial circumferential effacement of the dural sac with a narrowed AP canal diameter measuring 8.5 mm. Severe right foraminal stenosis. Moderate-to-severe left foraminal stenosis.   C6/C7: Severe disc space narrowing. Mild annular disc bulge. Small central disc protrusion. Uncovertebral joint osteophytosis, advanced facet joint arthrosis and ligamentum flavum hypertrophy. Partial circumferential effacement of the dural sac with a narrowed AP canal diameter measuring 9.2 mm. Severe bilateral foraminal stenosis.   C7/T1: Mild disc space narrowing. Minimal central disc protrusion. The AP canal diameter measures 11.2 mm. Uncovertebral joint osteophytes and facet joint arthrosis. Patent neural foramen.      ASSESSMENT: Brittney Freitas is a 79 y.o. female with PMH significant for hx of right rotator cuff repair (1/2019), ITP (sees Hematologist in [place]), HTN, and GERD presents as an established patient for the continued management of neck pain. The patient reports that she has yet to receive any benefit from her recent C7-T1 cervical interlaminar epidural steroid injection performed on 6/18/2020. Prior MRI report from 2019 was significant for severe DDD  and neural foraminal stenosis which I suspect is causing the majority of her pain. The patient is also reporting of right shoulder pain in the background of prior right rotator cuff surgery. The patient reports of no improvement with Tramadol or Tylenol #3. Treatment plan outlined below.     PLAN:  1. Prescribe norco 5-325 mg PO q 12 hr; #60 tablets; 0 refills. Instructed the patient to discontinue Tylenol #3 and Tramadol while taking Norco. The patient expressed understanding.   2. Referral for Dr. Pitts provided for right shoulder pain given history of prior shoulder surgery  3. I have stressed the importance of physical activity and a home exercise plan to help with chronic pain and improve health.  4. Instructed the patient to bring her medications at her next clinic visit. I plan to optimize her multi-modal pain regimen after I review her current pain regimen personally.   5. RTC in 1 month for follow-up. If the patient's pain fails to improve with Norco 5-325 mg; the patient would likely require a surgery referral for possible surgical intervention on her cervical spine.     Nicholas Pardo MD  Pain Management

## 2020-07-16 ENCOUNTER — OFFICE VISIT (OUTPATIENT)
Dept: ORTHOPEDICS | Facility: CLINIC | Age: 80
End: 2020-07-16
Payer: MEDICARE

## 2020-07-16 VITALS
WEIGHT: 189 LBS | HEIGHT: 67 IN | HEART RATE: 87 BPM | DIASTOLIC BLOOD PRESSURE: 70 MMHG | BODY MASS INDEX: 29.66 KG/M2 | SYSTOLIC BLOOD PRESSURE: 131 MMHG

## 2020-07-16 DIAGNOSIS — Z98.890 HISTORY OF REPAIR OF RIGHT ROTATOR CUFF: ICD-10-CM

## 2020-07-16 DIAGNOSIS — Z98.890 HISTORY OF ARTHROSCOPY OF RIGHT SHOULDER: ICD-10-CM

## 2020-07-16 DIAGNOSIS — M75.42 IMPINGEMENT SYNDROME OF SHOULDER, LEFT: Primary | ICD-10-CM

## 2020-07-16 DIAGNOSIS — M12.811 ROTATOR CUFF ARTHROPATHY OF RIGHT SHOULDER: ICD-10-CM

## 2020-07-16 PROCEDURE — 20610 DRAIN/INJ JOINT/BURSA W/O US: CPT | Mod: 50,S$GLB,, | Performed by: ORTHOPAEDIC SURGERY

## 2020-07-16 PROCEDURE — 99213 OFFICE O/P EST LOW 20 MIN: CPT | Mod: 25,S$GLB,, | Performed by: ORTHOPAEDIC SURGERY

## 2020-07-16 PROCEDURE — 20610 LARGE JOINT ASPIRATION/INJECTION: R SUBACROMIAL BURSA: ICD-10-PCS | Mod: 50,S$GLB,, | Performed by: ORTHOPAEDIC SURGERY

## 2020-07-16 PROCEDURE — 99213 PR OFFICE/OUTPT VISIT, EST, LEVL III, 20-29 MIN: ICD-10-PCS | Mod: 25,S$GLB,, | Performed by: ORTHOPAEDIC SURGERY

## 2020-07-16 RX ORDER — METHYLPREDNISOLONE ACETATE 40 MG/ML
40 INJECTION, SUSPENSION INTRA-ARTICULAR; INTRALESIONAL; INTRAMUSCULAR; SOFT TISSUE
Status: DISCONTINUED | OUTPATIENT
Start: 2020-07-16 | End: 2020-07-16 | Stop reason: HOSPADM

## 2020-07-16 RX ADMIN — METHYLPREDNISOLONE ACETATE 40 MG: 40 INJECTION, SUSPENSION INTRA-ARTICULAR; INTRALESIONAL; INTRAMUSCULAR; SOFT TISSUE at 11:07

## 2020-07-16 NOTE — PROCEDURES
Large Joint Aspiration/Injection: R subacromial bursa    Date/Time: 7/16/2020 11:30 AM  Performed by: Sridhar Pitts MD  Authorized by: Sridhar Pitts MD     Consent Done?:  Yes (Verbal)  Indications:  Pain  Site marked: the procedure site was marked    Timeout: prior to procedure the correct patient, procedure, and site was verified    Prep: patient was prepped and draped in usual sterile fashion      Local anesthesia used?: Yes    Local anesthetic:  Lidocaine 1% without epinephrine    Details:  Needle Size:  25 G  Ultrasonic Guidance for needle placement?: No    Location:  Shoulder  Site:  R subacromial bursa  Medications:  40 mg methylPREDNISolone acetate 40 mg/mL  Patient tolerance:  Patient tolerated the procedure well with no immediate complications     80 MG DEPOMEDROL  Large Joint Aspiration/Injection: L subacromial bursa    Date/Time: 7/16/2020 11:30 AM  Performed by: Sridhar Pitts MD  Authorized by: Sridhar Pitts MD     Consent Done?:  Yes (Verbal)  Indications:  Pain  Site marked: the procedure site was marked    Timeout: prior to procedure the correct patient, procedure, and site was verified    Prep: patient was prepped and draped in usual sterile fashion      Local anesthesia used?: Yes    Local anesthetic:  Lidocaine 1% without epinephrine  Ultrasonic Guidance for needle placement?: No    Location:  Shoulder  Site:  L subacromial bursa  Medications:  40 mg methylPREDNISolone acetate 40 mg/mL  Patient tolerance:  Patient tolerated the procedure well with no immediate complications     80 MG DEPOMEDROL

## 2020-07-16 NOTE — PROGRESS NOTES
[facility]    Subjective:     Chief Complaint:   Chief Complaint   Patient presents with    Left Shoulder - Pain     Pain x years, painful and limited ROM,  Saw Dr Pitts and referred to Dr Pardo, did cervical injections    Right Shoulder - Pain       Past Medical History:   Diagnosis Date    Diabetes mellitus, type 2     Hypertension     Reflux esophagitis     Seasonal allergies        Past Surgical History:   Procedure Laterality Date    BACK SURGERY      EPIDURAL STEROID INJECTION N/A 6/18/2020    Procedure: ARACELY C7-T1;  Surgeon: Nicholas Pardo MD;  Location: [facility] OR;  Service: Pain Management;  Laterality: N/A;    EPIDURAL STEROID INJECTION INTO CERVICAL SPINE N/A 9/30/2019    Procedure: Injection-steroid-epidural-cervical;  Surgeon: Nicholas Pardo MD;  Location: [facility] OR;  Service: Pain Management;  Laterality: N/A;  C7-T1    KNEE SURGERY      ovarian tumor      SHOULDER ARTHROSCOPY         Current Outpatient Medications   Medication Sig    busPIRone (BUSPAR) 10 MG tablet Take 10 mg by mouth 3 (three) times daily.    calcium carbonate (OS-IVIS) 600 mg (1,500 mg) Tab Take 600 mg by mouth 2 (two) times daily with meals.    cetirizine (ZYRTEC) 10 MG tablet Take 10 mg by mouth once daily.    diclofenac (VOLTAREN) 0.1 % ophthalmic solution 1 drop 4 (four) times daily.    diclofenac sodium (VOLTAREN) 1 % Gel Apply 2 g topically 3 (three) times daily.    ergocalciferol (ERGOCALCIFEROL) 50,000 unit Cap Take 50,000 Units by mouth every 7 days.    escitalopram oxalate (LEXAPRO) 20 MG tablet Take 20 mg by mouth once daily.    evolocumab (REPATHA SURECLICK SUBQ) Inject into the skin.    fluticasone propionate (FLONASE) 50 mcg/actuation nasal spray   See Instructions, USE 1 SPRAY IN EACH NOSTRIL DAILY, gm, 7 Refill(s), # 48, eRx: [facility]    folic acid (FOLVITE) 1 MG tablet Take 1 mg by mouth once daily.    gabapentin (NEURONTIN) 100 MG capsule Take 3 capsules (300 mg  total) by mouth 3 (three) times daily.    HYDROcodone-acetaminophen (NORCO) 5-325 mg per tablet Take 1 tablet by mouth every 12 (twelve) hours as needed for Pain.    lisinopril (PRINIVIL,ZESTRIL) 20 MG tablet Take 20 mg by mouth once daily.    pantoprazole (PROTONIX) 40 MG tablet Take 40 mg by mouth once daily.    traMADol (ULTRAM) 50 mg tablet Take 1 tablet (50 mg total) by mouth every 8 (eight) hours as needed for Pain.     No current facility-administered medications for this visit.      Facility-Administered Medications Ordered in Other Visits   Medication    lactated ringers infusion       Review of patient's allergies indicates:   Allergen Reactions    Adhesive     Ciprofloxacin     Escitalopram oxalate     Pregabalin     Penicillin        No family history on file.    Social History     Socioeconomic History    Marital status:      Spouse name: Not on file    Number of children: Not on file    Years of education: Not on file    Highest education level: Not on file   Occupational History    Not on file   Social Needs    Financial resource strain: Not on file    Food insecurity     Worry: Not on file     Inability: Not on file    Transportation needs     Medical: Not on file     Non-medical: Not on file   Tobacco Use    Smoking status: Never Smoker    Smokeless tobacco: Never Used   Substance and Sexual Activity    Alcohol use: Yes     Comment: occasionally    Drug use: No    Sexual activity: Not Currently   Lifestyle    Physical activity     Days per week: Not on file     Minutes per session: Not on file    Stress: Not on file   Relationships    Social connections     Talks on phone: Not on file     Gets together: Not on file     Attends Evangelical service: Not on file     Active member of club or organization: Not on file     Attends meetings of clubs or organizations: Not on file     Relationship status: Not on file   Other Topics Concern    Not on file   Social History  Narrative    Not on file       History of present illness:  Patient comes in today for her bilateral shoulders.  The right is much worse than the left but both bother her.  She underwent right rotator cuff repair several years ago.  She is not clear exactly when.  She never really had a good result.  She had persistent discomfort.      Review of Systems:    Constitution: Negative for chills, fever, and sweats.  Negative for unexplained weight loss.    HENT:  Negative for headaches and blurry vision.    Cardiovascular:Negative for chest pain or irregular heart beat. Negative for hypertension.    Respiratory:  Negative for cough and shortness of breath.    Gastrointestinal: Negative for abdominal pain, heartburn, melena, nausea, and vomitting.    Genitourinary:  Negative bladder incontinence and dysuria.    Musculoskeletal:  See HPI for details.     Neurological: Negative for numbness.    Psychiatric/Behavioral: Negative for depression.  The patient is not nervous/anxious.      Endocrine: Negative for polyuria    Hematologic/Lymphatic: Negative for bleeding problem.  Does not bruise/bleed easily.    Skin: Negative for poor would healing and rash    Objective:      Physical Examination:    Vital Signs:    Vitals:    07/16/20 1130   BP: 131/70   Pulse: 87       Body mass index is 29.6 kg/m².    This a well-developed, well nourished patient in no acute distress.  They are alert and oriented and cooperative to examination.        Patient has 70° of flexion bilaterally 45° of abduction bilaterally.  External rotation is 45° bilaterally.  She has well-healed incisions on the right none on the left.  Spurling sign is negative although she does have pain with motion of the cervical spine  Pertinent New Results:    XRAY Report / Interpretation:   AP and lateral the right shoulder demonstrates retained hardware.  This is consistent with prior rotator cuff repair.  There is minimally high riding of the humeral head.  AP and  lateral the left shoulder demonstrates acromioclavicular arthrosis.  Mild high riding of the humeral head.  No glenohumeral arthrosis.  Assessment/Plan:      Failed rotator cuff repair right.  I injected the right glenohumeral joint and subacromial space with Depo-Medrol and lidocaine.  Pinch Min syndrome left.  Rotator cuff tear left.  I injected the left shoulder with Depo-Medrol and lidocaine.  She will follow-up p.r.n.      This note was created using Dragon voice recognition software that occasionally misinterpreted phrases or words.

## 2020-07-16 NOTE — LETTER
July 16, 2020      Nicholas Pardo MD  [address] [place] [address]   [address]  [facility] [address]           [facility]  [address] [facility] [address]  [facility] [address]  Phone: [phone]  Fax: [phone]          Patient: Brittney Freitas   MR Number: [id number]   YOB: 1940   Date of Visit: 7/16/2020       Dear Dr. Nicholas Pardo:    Thank you for referring Brittney Freitas to me for evaluation. Attached you will find relevant portions of my assessment and plan of care.    If you have questions, please do not hesitate to call me. I look forward to following Brittney Freitas along with you.    Sincerely,    Sridhar Pitts MD    Enclosure  CC:  No Recipients    If you would like to receive this communication electronically, please contact [email or web address] or [phone] to request more information on [facility] access.    For providers and/or their staff who would like to refer a patient to [facility], please contact us through our one-stop-shop provider referral line, [facility], [place], at [phone].    If you feel you have received this communication in error or would no longer like to receive these types of communications, please e-mail [email or web address]

## 2020-08-05 ENCOUNTER — OFFICE VISIT (OUTPATIENT)
Dept: PAIN MEDICINE | Facility: CLINIC | Age: 80
End: 2020-08-05
Payer: MEDICARE

## 2020-08-05 VITALS
HEIGHT: 67 IN | HEART RATE: 82 BPM | OXYGEN SATURATION: 99 % | DIASTOLIC BLOOD PRESSURE: 84 MMHG | BODY MASS INDEX: 30.49 KG/M2 | WEIGHT: 194.25 LBS | TEMPERATURE: 99 F | SYSTOLIC BLOOD PRESSURE: 148 MMHG | RESPIRATION RATE: 18 BRPM

## 2020-08-05 DIAGNOSIS — M12.811 RIGHT ROTATOR CUFF TEAR ARTHROPATHY: ICD-10-CM

## 2020-08-05 DIAGNOSIS — M50.30 DDD (DEGENERATIVE DISC DISEASE), CERVICAL: Primary | ICD-10-CM

## 2020-08-05 DIAGNOSIS — M75.101 RIGHT ROTATOR CUFF TEAR ARTHROPATHY: ICD-10-CM

## 2020-08-05 PROCEDURE — 99999 PR PBB SHADOW E&M-EST. PATIENT-LVL IV: CPT | Mod: PBBFAC,,, | Performed by: ANESTHESIOLOGY

## 2020-08-05 PROCEDURE — 99214 OFFICE O/P EST MOD 30 MIN: CPT | Mod: PBBFAC,PO | Performed by: ANESTHESIOLOGY

## 2020-08-05 PROCEDURE — 99213 PR OFFICE/OUTPT VISIT, EST, LEVL III, 20-29 MIN: ICD-10-PCS | Mod: S$PBB,,, | Performed by: ANESTHESIOLOGY

## 2020-08-05 PROCEDURE — 99213 OFFICE O/P EST LOW 20 MIN: CPT | Mod: S$PBB,,, | Performed by: ANESTHESIOLOGY

## 2020-08-05 PROCEDURE — 99999 PR PBB SHADOW E&M-EST. PATIENT-LVL IV: ICD-10-PCS | Mod: PBBFAC,,, | Performed by: ANESTHESIOLOGY

## 2020-08-05 RX ORDER — DULOXETIN HYDROCHLORIDE 60 MG/1
60 CAPSULE, DELAYED RELEASE ORAL DAILY
COMMUNITY

## 2020-08-05 RX ORDER — QUETIAPINE FUMARATE 25 MG/1
TABLET, FILM COATED ORAL
COMMUNITY

## 2020-08-05 RX ORDER — HYDROCODONE BITARTRATE AND ACETAMINOPHEN 5; 325 MG/1; MG/1
1 TABLET ORAL EVERY 8 HOURS PRN
Qty: 90 TABLET | Refills: 0 | Status: ON HOLD | OUTPATIENT
Start: 2020-08-05 | End: 2020-11-13 | Stop reason: HOSPADM

## 2020-08-05 RX ORDER — MONTELUKAST SODIUM 10 MG/1
10 TABLET ORAL NIGHTLY
COMMUNITY
End: 2021-12-23

## 2020-08-05 NOTE — PROGRESS NOTES
FOLLOW UP NOTE:     CHIEF COMPLAINT: neck and shoulder pain    INITIAL HISTORY OF PRESENT ILLNESS: Brittney Freitas is a 79 y.o. female with PMH significant for hx of right rotator cuff repair (1/2019), ITP (sees Hematologist in [place]), HTN, and GERD presents for the evaluation of neck pain. Neck pain began < 1 year ago. Patient cannot recall neck pain prior to her right rotator cuff surgery. Patient does endorse of intermittent falls sporadically but cannot identify any specific inciting event to her trauma. Patient localizes her neck pain to the right side of her neck. The patient denies of radiation of her pain from her neck. Patient denies of any of numbness or tingling sensation. Patient reports that her pain is a 8-9/10. Patient reports that her pain is constant, burning and sharp pain. Patient denies of any urinary/fecal incontinence, saddle anesthesia, or recent falls.      Aggravating factors: extension of the neck and lateral rotation      Mitigating factors: neck injection at [facility] years ago.      Relevant Surgeries: yes; right rotator cuff repair; hx of lumbar surgery     INTERVAL HISTORY OF PRESENT ILLNESS: Brittney Freitas is a 80 y.o. female with PMH significant for hx of right rotator cuff repair (1/2019), ITP (sees Hematologist in [place]), HTN, and GERD presents as an established patient for the continued management of neck pain. The patient denies of any significant changes in her health since her last appointment. The patient reports that she has been doing well for the past few days and does not report of pain for the last few days which she is pleased about. The patient cannot identify the exact reason as to why she has felt good as of late. I have updated the patient's current pain regimen as listed below. Patient denies of any urinary/fecal incontinence, saddle anesthesia, or weakness.     INTERVENTIONAL PAIN HISTORY:  7/16/2020: Right and left subacromial bursa via  "Dr. Pitts - reports of benefit   6/18/2020: C7-T1 cervical interlaminar epidural steroid injection  9/30/2019: C7-T1 cervical interlaminar epidural steroid injection - good relief    CURRENT PAIN MEDICATIONS:   Tramadol 50 mg PO q day PRN - no benefit  Tylenol #3 - no benefit    I reviewed the patient's medications personally as the patient brought her medications to her clinic visit today.   Buspirone 10 mg PO TID  duloxetine 60 mg PO daily  Norco 5-325 mg PO q day  Gabapentin 400 mg PO QID    ROS:  Review of Systems   Constitutional: Negative for chills and fever.   HENT: Negative for sore throat.    Eyes: Negative for visual disturbance.   Respiratory: Negative for shortness of breath.    Cardiovascular: Negative for chest pain.   Gastrointestinal: Negative for nausea and vomiting.   Genitourinary: Negative for difficulty urinating.   Musculoskeletal: Negative for arthralgias and neck pain.   Skin: Negative for rash.   Allergic/Immunologic: Negative for immunocompromised state.   Neurological: Negative for syncope.   Hematological: Does not bruise/bleed easily.   Psychiatric/Behavioral: Negative for suicidal ideas.        MEDICAL, SURGICAL, FAMILY, SOCIAL HX: reviewed    MEDICATIONS/ALLERGIES: reviewed    PHYSICAL EXAM:    VITALS: Vitals reviewed.   Vitals:    08/05/20 1110   BP: (!) 148/84   Pulse: 82   Resp: 18   Temp: 98.8 °F (37.1 °C)   TempSrc: Temporal   SpO2: 99%   Weight: 88.1 kg (194 lb 3.6 oz)   Height: 5' 7" (1.702 m)       Physical Exam   Constitutional: She is oriented to person, place, and time and well-developed, well-nourished, and in no distress.   HENT:   Head: Normocephalic and atraumatic.   Eyes: Conjunctivae and EOM are normal. Right eye exhibits no discharge. Left eye exhibits no discharge.   Cardiovascular: Normal rate.   Pulmonary/Chest: Effort normal and breath sounds normal. No respiratory distress.   Abdominal: Soft.   Neurological: She is alert and oriented to person, place, and time. "   Skin: Skin is warm and dry. No rash noted. She is not diaphoretic.   Psychiatric: Mood, memory, affect and judgment normal.   Nursing note and vitals reviewed.     UPPER EXTREMITIES: Normal alignment, normal range of motion, no atrophy, no skin changes,  hair growth and nail growth normal and equal bilaterally. No swelling, no tenderness. Pain against active abduction on the right shoulder.      LOWER EXTREMITIES:  Normal alignment, normal range of motion, no atrophy, no skin changes,  hair growth and nail growth normal and equal bilaterally. No swelling, no tenderness.     CERVICAL SPINE:  Cervical spine: Limited ROM is in flexion, extension and lateral rotation with increased pain.  ((+)) Spurling's maneuver bilaterally for neck pain but not radicular pain  Myofascial exam: Tenderness to palpation across cervical paraspinous region bilaterally.     MOTOR: Tone and bulk: normal bilateral upper and lower Strength: normal   Delt      Bi         Tri        WE      WF                        R          5          5          5          5          5          5            L          5          5          5          5          5          5               IP         ADD     ABD     Quad   TA        Gas      HAM  R          5          5          5          5          5          5          5  L          5          5          5          5          5          5          5     SENSATION: Decreased sensation in the C8 distribution of the right hand.   REFLEXES: normal, symmetric, nonbrisk.  Toes down, no clonus. Negative clark's sign bilaterally.  GAIT: normal rise, base, steps, and arm swing.      IMAGING: no new imaging to review    ASSESSMENT: Brittney Freitas is a 80 y.o. female with PMH significant for hx of right rotator cuff repair (1/2019), ITP (sees Hematologist in [place]), HTN, and GERD presents as an established patient for the continued management of neck pain and shoulder pain. I believe the patient  has received benefit from her epidural steroid injection in addition to her shoulder injections performed by Dr. Pitts. The patient reports that she is currently satisfied with her pain relief today. Treatment plan outlined below.     PLAN:  1. Refilled Norco 5-325 mg PO q 8 hr PRN; #90 tablets; 0 refills.  without discrepancies.   2. Instructed the patient to follow-up with Dr. Pitts PRN if her shoulder pain recurds.   3. Can repeat cervical interlaminar epidural steroid injection in the future as needed if her neck pain recurs  4. I have stressed the importance of physical activity and a home exercise plan to help with chronic pain and improve health.  5. Continue Buspirone 10 mg PO TID, duloxetine 60 mg PO daily, and Gabapentin 400 mg PO QID as prescribed  6. Follow-up in 2 months or sooner as needed. UDS to be collected in the future.     Nicholas Pardo MD  Pain Management

## 2020-09-22 ENCOUNTER — OFFICE VISIT (OUTPATIENT)
Dept: PAIN MEDICINE | Facility: CLINIC | Age: 80
End: 2020-09-22
Payer: MEDICARE

## 2020-09-22 VITALS
DIASTOLIC BLOOD PRESSURE: 78 MMHG | TEMPERATURE: 97 F | HEIGHT: 67 IN | WEIGHT: 197 LBS | HEART RATE: 83 BPM | SYSTOLIC BLOOD PRESSURE: 150 MMHG | BODY MASS INDEX: 30.92 KG/M2 | RESPIRATION RATE: 17 BRPM

## 2020-09-22 DIAGNOSIS — M47.892 OTHER SPONDYLOSIS, CERVICAL REGION: ICD-10-CM

## 2020-09-22 DIAGNOSIS — M47.812 ARTHROPATHY OF CERVICAL FACET JOINT: Primary | ICD-10-CM

## 2020-09-22 DIAGNOSIS — M50.30 DDD (DEGENERATIVE DISC DISEASE), CERVICAL: ICD-10-CM

## 2020-09-22 DIAGNOSIS — M79.18 MYOFASCIAL PAIN: ICD-10-CM

## 2020-09-22 PROBLEM — E55.9 VITAMIN D DEFICIENCY: Status: ACTIVE | Noted: 2020-09-22

## 2020-09-22 PROBLEM — M81.0 OSTEOPOROSIS: Status: ACTIVE | Noted: 2020-09-22

## 2020-09-22 PROBLEM — F43.29 ADJUSTMENT DISORDER WITH MIXED EMOTIONAL FEATURES: Status: ACTIVE | Noted: 2020-09-22

## 2020-09-22 PROBLEM — E11.42: Status: ACTIVE | Noted: 2020-09-22

## 2020-09-22 PROBLEM — G89.29 CHRONIC NECK PAIN: Status: ACTIVE | Noted: 2020-09-22

## 2020-09-22 PROBLEM — E11.9 TYPE 2 DIABETES MELLITUS: Status: ACTIVE | Noted: 2020-09-22

## 2020-09-22 PROBLEM — G82.20 PARAPARESIS: Status: ACTIVE | Noted: 2020-09-22

## 2020-09-22 PROBLEM — M89.49 OSTEOARTHROSIS INVOLVING MULTIPLE SITES BUT NOT DESIGNATED AS GENERALIZED: Status: ACTIVE | Noted: 2020-09-22

## 2020-09-22 PROBLEM — M54.30 SCIATICA: Status: ACTIVE | Noted: 2020-09-22

## 2020-09-22 PROBLEM — M54.6 THORACIC BACK PAIN: Status: ACTIVE | Noted: 2020-09-22

## 2020-09-22 PROBLEM — F51.02 ADJUSTMENT INSOMNIA: Status: ACTIVE | Noted: 2020-09-22

## 2020-09-22 PROBLEM — I10 BENIGN HYPERTENSION: Status: ACTIVE | Noted: 2020-09-22

## 2020-09-22 PROBLEM — N39.41 URGE INCONTINENCE OF URINE: Status: ACTIVE | Noted: 2020-09-22

## 2020-09-22 PROBLEM — E78.2 MIXED HYPERLIPIDEMIA: Status: ACTIVE | Noted: 2020-09-22

## 2020-09-22 PROBLEM — M54.2 CHRONIC NECK PAIN: Status: ACTIVE | Noted: 2020-09-22

## 2020-09-22 PROBLEM — E66.9 OBESITY: Status: ACTIVE | Noted: 2020-09-22

## 2020-09-22 PROBLEM — M19.90 CHRONIC OSTEOARTHRITIS: Status: ACTIVE | Noted: 2020-09-22

## 2020-09-22 PROBLEM — M75.40 IMPINGEMENT SYNDROME OF SHOULDER REGION: Status: ACTIVE | Noted: 2020-09-22

## 2020-09-22 PROBLEM — M48.061 SPINAL STENOSIS OF LUMBAR REGION: Status: ACTIVE | Noted: 2020-09-22

## 2020-09-22 PROBLEM — R41.89 IMPAIRED COGNITION: Status: ACTIVE | Noted: 2020-09-22

## 2020-09-22 PROCEDURE — 99999 PR PBB SHADOW E&M-EST. PATIENT-LVL III: ICD-10-PCS | Mod: PBBFAC,,, | Performed by: NURSE PRACTITIONER

## 2020-09-22 PROCEDURE — 99214 OFFICE O/P EST MOD 30 MIN: CPT | Mod: S$PBB,,, | Performed by: NURSE PRACTITIONER

## 2020-09-22 PROCEDURE — 99214 PR OFFICE/OUTPT VISIT, EST, LEVL IV, 30-39 MIN: ICD-10-PCS | Mod: S$PBB,,, | Performed by: NURSE PRACTITIONER

## 2020-09-22 PROCEDURE — 99213 OFFICE O/P EST LOW 20 MIN: CPT | Mod: PBBFAC,PO | Performed by: NURSE PRACTITIONER

## 2020-09-22 PROCEDURE — 99999 PR PBB SHADOW E&M-EST. PATIENT-LVL III: CPT | Mod: PBBFAC,,, | Performed by: NURSE PRACTITIONER

## 2020-09-22 RX ORDER — TIZANIDINE 4 MG/1
4 TABLET ORAL EVERY 6 HOURS PRN
Qty: 90 TABLET | Refills: 3 | Status: SHIPPED | OUTPATIENT
Start: 2020-09-22 | End: 2020-10-02

## 2020-09-22 NOTE — PROGRESS NOTES
"FOLLOW UP NOTE:     CHIEF COMPLAINT:     INITIAL HISTORY OF PRESENT ILLNESS:Brittney Freitas is a 79 y.o. female with PMH significant for hx of right rotator cuff repair (1/2019), ITP (sees Hematologist in [place]), HTN, and GERD presents for the evaluation of neck pain. Neck pain began < 1 year ago. Patient cannot recall neck pain prior to her right rotator cuff surgery. Patient does endorse of intermittent falls sporadically but cannot identify any specific inciting event to her trauma. Patient localizes her neck pain to the right side of her neck. The patient denies of radiation of her pain from her neck. Patient denies of any of numbness or tingling sensation. Patient reports that her pain is a 8-9/10. Patient reports that her pain is constant, burning and sharp pain. Patient denies of any urinary/fecal incontinence, saddle anesthesia, or recent falls.      Aggravating factors: extension of the neck and lateral rotation      Mitigating factors: neck injection at [facility] years ago.      Relevant Surgeries: yes; right rotator cuff repair; hx of lumbar surgery      INTERVAL HISTORY OF PRESENT ILLNESS: Brittney Freitas is a 80 y.o. female  with PMH significant for hx of right rotator cuff repair (1/2019), ITP (sees Hematologist in [place]), HTN, and GERD presents as an established patient for the continued management of neck pain. She reports that she feels that her neck pain is coming from her shoulder. She says that she felt better after the injections from Dr. Pitts than she did with the ARACELY. She would like to have these done again when she is able. She has a hard time with neck movement laterally. She feels that "my muscles won't let me turn all the way". She describes the pain in the trapezius area of bilateral sides of her neck with right worse than left. The pain radiates into her scapula area and down into her shoulders. She denies numbness or tingling in her hands. She rates her pain " "8/10.   Patient denies of any urinary/fecal incontinence, saddle anesthesia, or weakness.       INTERVENTIONAL PAIN HISTORY:  7/16/2020: Right and left subacromial bursa via Dr. Pitts - reports of benefit   6/18/2020: C7-T1 cervical interlaminar epidural steroid injection  9/30/2019: C7-T1 cervical interlaminar epidural steroid injection - good relief    CURRENT PAIN MEDICATIONS:   Norco 5-325 mg PO TID as needed  Gabapentin 400 mg PO QID   Buspirone 10 mg PO TID  Cymbalta 60 mg PO daily    :      IMAGING:  No new imaging to review    ROS:  Review of Systems   Constitutional: Negative for chills and fever.   HENT: Negative for sore throat.    Eyes: Negative for visual disturbance.   Respiratory: Negative for shortness of breath.    Cardiovascular: Negative for chest pain.   Gastrointestinal: Negative for nausea and vomiting.   Genitourinary: Negative for difficulty urinating.   Musculoskeletal: Positive for myalgias and neck pain. Negative for arthralgias.   Skin: Negative for rash.   Allergic/Immunologic: Negative for immunocompromised state.   Neurological: Negative for syncope.   Hematological: Does not bruise/bleed easily.   Psychiatric/Behavioral: Negative for suicidal ideas.        MEDICAL, SURGICAL, FAMILY, SOCIAL HX: reviewed    MEDICATIONS/ALLERGIES: reviewed    PHYSICAL EXAM:    VITALS: Vitals reviewed.   Vitals:    09/22/20 1029   BP: (!) 150/78   Pulse: 83   Resp: 17   Temp: 97.1 °F (36.2 °C)   TempSrc: Temporal   Weight: 89.4 kg (196 lb 15.7 oz)   Height: 5' 7" (1.702 m)   PainSc:   8   PainLoc: Head       Physical Exam   Constitutional: She is oriented to person, place, and time and well-developed, well-nourished, and in no distress.   HENT:   Head: Normocephalic and atraumatic.   Eyes: Conjunctivae and EOM are normal. Right eye exhibits no discharge. Left eye exhibits no discharge.   Cardiovascular: Normal rate.   Pulmonary/Chest: Effort normal and breath sounds normal. No respiratory distress. "   Abdominal: Soft.   Musculoskeletal:         General: Tenderness present.   Neurological: She is alert and oriented to person, place, and time.   Skin: Skin is warm and dry. No rash noted. She is not diaphoretic.   Psychiatric: Mood, memory, affect and judgment normal.   Nursing note and vitals reviewed.     Tender to cervical trapezius muscles bilaterally. Tight to palpation.    ASSESSMENT: Brittney Freitas is a 80 y.o. female with PMH significant for hx of right rotator cuff repair (1/2019), ITP (sees Hematologist in [place]), HTN, and GERD presents as an established patient for the continued management of neck pain. We discussed her pain and previous interventions. Discussed myofascial component to her pain. Plan below:       1. Arthropathy of cervical facet joint     2. DDD (degenerative disc disease), cervical  tiZANidine (ZANAFLEX) 4 MG tablet   3. Other spondylosis, cervical region  tiZANidine (ZANAFLEX) 4 MG tablet   4. Myofascial pain  tiZANidine (ZANAFLEX) 4 MG tablet       PLAN:  1. Schedule for bilateral subacromial bursa injections with Dr. Pardo for mid-October  2. Prescribe tizanidine 4 mg PO to take TID as needed  3. Apply heat to cervical muscles in 20 minute intervals.  4. Contine Norco 5-325 mg PO TID as needed, Gabapentin 400 mg PO QID, Buspirone 10 mg PO TID, Cymbalta 60 mg PO daily   5. Printed exercises and stretches given for cervical spine and muscles.  6. Discussed safety in and around the home to prevent falls and injury. Discussed any environmental changes that can be made to help with safety.   7. I have stressed the importance of physical activity and a home exercise plan to help with chronic pain and improve health.  8. I discussed with the patient potential secondary side effects of medication prescribed and urged the patient to read all FDA approved materials that the pharmacy provides with the prescription.  9. RTC for procedure or sooner if needed      Vibha Naylor  CNP  Pain Management

## 2020-10-01 ENCOUNTER — TELEPHONE (OUTPATIENT)
Dept: PAIN MEDICINE | Facility: CLINIC | Age: 80
End: 2020-10-01

## 2020-10-01 DIAGNOSIS — G89.29 CHRONIC PAIN OF BOTH SHOULDERS: ICD-10-CM

## 2020-10-01 DIAGNOSIS — M12.811 ROTATOR CUFF ARTHROPATHY OF RIGHT SHOULDER: ICD-10-CM

## 2020-10-01 DIAGNOSIS — M75.101 RIGHT ROTATOR CUFF TEAR ARTHROPATHY: Primary | ICD-10-CM

## 2020-10-01 DIAGNOSIS — M25.511 CHRONIC PAIN OF BOTH SHOULDERS: ICD-10-CM

## 2020-10-01 DIAGNOSIS — M12.811 RIGHT ROTATOR CUFF TEAR ARTHROPATHY: Primary | ICD-10-CM

## 2020-10-01 DIAGNOSIS — M25.512 CHRONIC PAIN OF BOTH SHOULDERS: ICD-10-CM

## 2020-10-01 NOTE — TELEPHONE ENCOUNTER
Informed pt that Dr. Pardo would rather her go to her Orthopedist for the shoulder injections. Pt would like a referral to a new orthopedist. Scheduled her with Dr. Hernandez on 10/15. Pt verbalized understanding.

## 2020-11-09 ENCOUNTER — HOSPITAL ENCOUNTER (EMERGENCY)
Facility: HOSPITAL | Age: 80
Discharge: ANOTHER HOSPITAL | End: 2020-11-10
Attending: FAMILY MEDICINE
Payer: MEDICARE

## 2020-11-09 DIAGNOSIS — R41.82 ALTERED MENTAL STATUS, UNSPECIFIED ALTERED MENTAL STATUS TYPE: ICD-10-CM

## 2020-11-09 DIAGNOSIS — J12.82 PNEUMONIA DUE TO COVID-19 VIRUS: Primary | ICD-10-CM

## 2020-11-09 DIAGNOSIS — U07.1 PNEUMONIA DUE TO COVID-19 VIRUS: Primary | ICD-10-CM

## 2020-11-09 DIAGNOSIS — I10 HTN (HYPERTENSION): ICD-10-CM

## 2020-11-09 LAB
ALBUMIN SERPL BCP-MCNC: 3.7 G/DL (ref 3.5–5.2)
ALLENS TEST: ABNORMAL
ALP SERPL-CCNC: 40 U/L (ref 55–135)
ALT SERPL W/O P-5'-P-CCNC: 17 U/L (ref 10–44)
AMMONIA PLAS-SCNC: 20 UMOL/L (ref 10–50)
AMPHET+METHAMPHET UR QL: NEGATIVE
ANION GAP SERPL CALC-SCNC: 12 MMOL/L (ref 8–16)
AST SERPL-CCNC: 25 U/L (ref 10–40)
BACTERIA #/AREA URNS HPF: ABNORMAL /HPF
BARBITURATES UR QL SCN>200 NG/ML: NEGATIVE
BASOPHILS # BLD AUTO: 0.01 K/UL (ref 0–0.2)
BASOPHILS NFR BLD: 0.1 % (ref 0–1.9)
BENZODIAZ UR QL SCN>200 NG/ML: NEGATIVE
BILIRUB SERPL-MCNC: 0.6 MG/DL (ref 0.1–1)
BILIRUB UR QL STRIP: NEGATIVE
BUN SERPL-MCNC: 20 MG/DL (ref 8–23)
BZE UR QL SCN: NEGATIVE
CALCIUM SERPL-MCNC: 8.5 MG/DL (ref 8.7–10.5)
CANNABINOIDS UR QL SCN: NEGATIVE
CHLORIDE SERPL-SCNC: 98 MMOL/L (ref 95–110)
CLARITY UR: CLEAR
CO2 SERPL-SCNC: 24 MMOL/L (ref 23–29)
COLOR UR: YELLOW
CPAPPEEP: ABNORMAL
CREAT SERPL-MCNC: 0.6 MG/DL (ref 0.5–1.4)
CREAT UR-MCNC: 228.9 MG/DL (ref 15–325)
DIFFERENTIAL METHOD: ABNORMAL
EOSINOPHIL # BLD AUTO: 0 K/UL (ref 0–0.5)
EOSINOPHIL NFR BLD: 0 % (ref 0–8)
ERYTHROCYTE [DISTWIDTH] IN BLOOD BY AUTOMATED COUNT: 13 % (ref 11.5–14.5)
ERYTHROCYTE [SEDIMENTATION RATE] IN BLOOD BY WESTERGREN METHOD: ABNORMAL MM/H
EST. GFR  (AFRICAN AMERICAN): >60 ML/MIN/1.73 M^2
EST. GFR  (NON AFRICAN AMERICAN): >60 ML/MIN/1.73 M^2
GLUCOSE SERPL-MCNC: 159 MG/DL (ref 70–110)
GLUCOSE UR QL STRIP: NEGATIVE
HCO3 UR-SCNC: 25.6 MMOL/L (ref 22–26)
HCT VFR BLD AUTO: 37.5 % (ref 37–48.5)
HGB BLD-MCNC: 12.5 G/DL (ref 12–16)
HGB UR QL STRIP: NEGATIVE
HYALINE CASTS #/AREA URNS LPF: 0 /LPF
IMM GRANULOCYTES # BLD AUTO: 0.02 K/UL (ref 0–0.04)
IMM GRANULOCYTES NFR BLD AUTO: 0.3 % (ref 0–0.5)
INFLUENZA A, MOLECULAR: NEGATIVE
INFLUENZA B, MOLECULAR: NEGATIVE
KETONES UR QL STRIP: ABNORMAL
LACTATE SERPL-SCNC: 1.3 MMOL/L (ref 0.5–2.2)
LEUKOCYTE ESTERASE UR QL STRIP: NEGATIVE
LYMPHOCYTES # BLD AUTO: 1.5 K/UL (ref 1–4.8)
LYMPHOCYTES NFR BLD: 21.1 % (ref 18–48)
MAGNESIUM SERPL-MCNC: 1.8 MG/DL (ref 1.6–2.6)
MCH RBC QN AUTO: 30 PG (ref 27–31)
MCHC RBC AUTO-ENTMCNC: 33.3 G/DL (ref 32–36)
MCV RBC AUTO: 90 FL (ref 82–98)
MICROSCOPIC COMMENT: ABNORMAL
MODE: ABNORMAL
MONOCYTES # BLD AUTO: 0.7 K/UL (ref 0.3–1)
MONOCYTES NFR BLD: 9.8 % (ref 4–15)
NEUTROPHILS # BLD AUTO: 5 K/UL (ref 1.8–7.7)
NEUTROPHILS NFR BLD: 68.7 % (ref 38–73)
NITRITE UR QL STRIP: NEGATIVE
NRBC BLD-RTO: 0 /100 WBC
O2DEVICE: ABNORMAL
OPIATES UR QL SCN: NEGATIVE
PCO2 BLDA: 36 MMHG (ref 35–45)
PCP UR QL SCN>25 NG/ML: NEGATIVE
PH SMN: 7.46 [PH] (ref 7.35–7.45)
PH UR STRIP: 6 [PH] (ref 5–8)
PIP: ABNORMAL
PLATELET # BLD AUTO: 41 K/UL (ref 150–350)
PMV BLD AUTO: 12.4 FL (ref 9.2–12.9)
PO2 BLDA: 83 MMHG (ref 75–100)
POC BE: 2 MMOL/L (ref -2–2)
POC FIO2: ABNORMAL
POC FLOW: 2
POC O2 PERCENT: 28 %
POC SATURATED O2: 97 % (ref 90–100)
POC TCO2: 27 MMOL/L (ref 22–28)
POC TEMPERATURE: 37 C
POTASSIUM SERPL-SCNC: 3.7 MMOL/L (ref 3.5–5.1)
PRESSURE SUPPORT: ABNORMAL
PROT SERPL-MCNC: 7.5 G/DL (ref 6–8.4)
PROT UR QL STRIP: ABNORMAL
RBC # BLD AUTO: 4.16 M/UL (ref 4–5.4)
RBC #/AREA URNS HPF: 3 /HPF (ref 0–4)
SITE: ABNORMAL
SODIUM SERPL-SCNC: 134 MMOL/L (ref 136–145)
SP GR UR STRIP: 1.02 (ref 1–1.03)
SPECIMEN SOURCE: NORMAL
SQUAMOUS #/AREA URNS HPF: 15 /HPF
T4 FREE SERPL-MCNC: 0.9 NG/DL (ref 0.71–1.51)
TOXICOLOGY INFORMATION: NORMAL
TROPONIN I SERPL DL<=0.01 NG/ML-MCNC: 0.06 NG/ML (ref 0.02–0.5)
TSH SERPL DL<=0.005 MIU/L-ACNC: 1.2 UIU/ML (ref 0.34–5.6)
URN SPEC COLLECT METH UR: ABNORMAL
UROBILINOGEN UR STRIP-ACNC: NEGATIVE EU/DL
VT: ABNORMAL
WBC # BLD AUTO: 7.31 K/UL (ref 3.9–12.7)
WBC #/AREA URNS HPF: 8 /HPF (ref 0–5)

## 2020-11-09 PROCEDURE — 71045 X-RAY EXAM CHEST 1 VIEW: CPT | Mod: TC,FY

## 2020-11-09 PROCEDURE — 80307 DRUG TEST PRSMV CHEM ANLYZR: CPT

## 2020-11-09 PROCEDURE — 71045 X-RAY EXAM CHEST 1 VIEW: CPT | Mod: 26,,, | Performed by: RADIOLOGY

## 2020-11-09 PROCEDURE — 81000 URINALYSIS NONAUTO W/SCOPE: CPT | Mod: 59

## 2020-11-09 PROCEDURE — 82140 ASSAY OF AMMONIA: CPT

## 2020-11-09 PROCEDURE — 87040 BLOOD CULTURE FOR BACTERIA: CPT

## 2020-11-09 PROCEDURE — 87502 INFLUENZA DNA AMP PROBE: CPT

## 2020-11-09 PROCEDURE — 27000221 HC OXYGEN, UP TO 24 HOURS

## 2020-11-09 PROCEDURE — 84443 ASSAY THYROID STIM HORMONE: CPT

## 2020-11-09 PROCEDURE — 84484 ASSAY OF TROPONIN QUANT: CPT

## 2020-11-09 PROCEDURE — 80053 COMPREHEN METABOLIC PANEL: CPT

## 2020-11-09 PROCEDURE — 99900035 HC TECH TIME PER 15 MIN (STAT)

## 2020-11-09 PROCEDURE — 71045 XR CHEST AP PORTABLE: ICD-10-PCS | Mod: 26,,, | Performed by: RADIOLOGY

## 2020-11-09 PROCEDURE — 25000003 PHARM REV CODE 250: Performed by: FAMILY MEDICINE

## 2020-11-09 PROCEDURE — 83605 ASSAY OF LACTIC ACID: CPT

## 2020-11-09 PROCEDURE — 83735 ASSAY OF MAGNESIUM: CPT

## 2020-11-09 PROCEDURE — 84439 ASSAY OF FREE THYROXINE: CPT

## 2020-11-09 PROCEDURE — 36600 WITHDRAWAL OF ARTERIAL BLOOD: CPT

## 2020-11-09 PROCEDURE — U0002 COVID-19 LAB TEST NON-CDC: HCPCS

## 2020-11-09 PROCEDURE — 84145 PROCALCITONIN (PCT): CPT

## 2020-11-09 PROCEDURE — 82803 BLOOD GASES ANY COMBINATION: CPT

## 2020-11-09 PROCEDURE — 99285 EMERGENCY DEPT VISIT HI MDM: CPT | Mod: 25

## 2020-11-09 PROCEDURE — 94761 N-INVAS EAR/PLS OXIMETRY MLT: CPT

## 2020-11-09 PROCEDURE — 96361 HYDRATE IV INFUSION ADD-ON: CPT

## 2020-11-09 PROCEDURE — 85025 COMPLETE CBC W/AUTO DIFF WBC: CPT

## 2020-11-09 RX ORDER — SODIUM CHLORIDE 9 MG/ML
1000 INJECTION, SOLUTION INTRAVENOUS
Status: COMPLETED | OUTPATIENT
Start: 2020-11-09 | End: 2020-11-09

## 2020-11-09 RX ORDER — ACETAMINOPHEN 325 MG/1
650 TABLET ORAL
Status: COMPLETED | OUTPATIENT
Start: 2020-11-09 | End: 2020-11-09

## 2020-11-09 RX ADMIN — ACETAMINOPHEN 650 MG: 325 TABLET ORAL at 11:11

## 2020-11-09 RX ADMIN — SODIUM CHLORIDE 1000 ML: 0.9 INJECTION, SOLUTION INTRAVENOUS at 11:11

## 2020-11-09 RX ADMIN — SODIUM CHLORIDE 1000 ML: 0.9 INJECTION, SOLUTION INTRAVENOUS at 09:11

## 2020-11-10 ENCOUNTER — HOSPITAL ENCOUNTER (INPATIENT)
Facility: HOSPITAL | Age: 80
LOS: 4 days | Discharge: HOME HEALTH | DRG: 177 | End: 2020-11-14
Attending: HOSPITALIST | Admitting: HOSPITALIST
Payer: MEDICARE

## 2020-11-10 VITALS
TEMPERATURE: 99 F | BODY MASS INDEX: 27.32 KG/M2 | WEIGHT: 170 LBS | DIASTOLIC BLOOD PRESSURE: 83 MMHG | HEART RATE: 82 BPM | OXYGEN SATURATION: 98 % | SYSTOLIC BLOOD PRESSURE: 115 MMHG | RESPIRATION RATE: 18 BRPM | HEIGHT: 66 IN

## 2020-11-10 DIAGNOSIS — E55.9 VITAMIN D DEFICIENCY: ICD-10-CM

## 2020-11-10 DIAGNOSIS — Z91.89 AT RISK FOR POLYPHARMACY: ICD-10-CM

## 2020-11-10 DIAGNOSIS — D69.6 THROMBOCYTOPENIA: ICD-10-CM

## 2020-11-10 DIAGNOSIS — U07.1 COVID-19: ICD-10-CM

## 2020-11-10 DIAGNOSIS — U07.1 PNEUMONIA DUE TO COVID-19 VIRUS: Primary | ICD-10-CM

## 2020-11-10 DIAGNOSIS — M19.90 CHRONIC OSTEOARTHRITIS: ICD-10-CM

## 2020-11-10 DIAGNOSIS — J96.01 ACUTE HYPOXEMIC RESPIRATORY FAILURE: ICD-10-CM

## 2020-11-10 DIAGNOSIS — M48.061 SPINAL STENOSIS OF LUMBAR REGION, UNSPECIFIED WHETHER NEUROGENIC CLAUDICATION PRESENT: ICD-10-CM

## 2020-11-10 DIAGNOSIS — F43.29 ADJUSTMENT DISORDER WITH MIXED EMOTIONAL FEATURES: ICD-10-CM

## 2020-11-10 DIAGNOSIS — J12.82 PNEUMONIA DUE TO COVID-19 VIRUS: Primary | ICD-10-CM

## 2020-11-10 DIAGNOSIS — E11.69 TYPE 2 DIABETES MELLITUS WITH OTHER SPECIFIED COMPLICATION, WITHOUT LONG-TERM CURRENT USE OF INSULIN: ICD-10-CM

## 2020-11-10 LAB
25(OH)D3+25(OH)D2 SERPL-MCNC: 70 NG/ML (ref 30–96)
ALBUMIN SERPL BCP-MCNC: 3.2 G/DL (ref 3.5–5.2)
ALP SERPL-CCNC: 48 U/L (ref 55–135)
ALT SERPL W/O P-5'-P-CCNC: 13 U/L (ref 10–44)
ANION GAP SERPL CALC-SCNC: 10 MMOL/L (ref 8–16)
APTT BLDCRRT: 30.7 SEC (ref 21–32)
AST SERPL-CCNC: 22 U/L (ref 10–40)
BASOPHILS # BLD AUTO: 0.02 K/UL (ref 0–0.2)
BASOPHILS # BLD AUTO: 0.02 K/UL (ref 0–0.2)
BASOPHILS NFR BLD: 0.4 % (ref 0–1.9)
BASOPHILS NFR BLD: 0.4 % (ref 0–1.9)
BILIRUB SERPL-MCNC: 0.3 MG/DL (ref 0.1–1)
BNP SERPL-MCNC: 12 PG/ML (ref 0–99)
BUN SERPL-MCNC: 14 MG/DL (ref 8–23)
CALCIUM SERPL-MCNC: 8.3 MG/DL (ref 8.7–10.5)
CHLORIDE SERPL-SCNC: 102 MMOL/L (ref 95–110)
CK SERPL-CCNC: 109 U/L (ref 20–180)
CO2 SERPL-SCNC: 27 MMOL/L (ref 23–29)
CREAT SERPL-MCNC: 0.6 MG/DL (ref 0.5–1.4)
DIFFERENTIAL METHOD: ABNORMAL
DIFFERENTIAL METHOD: ABNORMAL
EOSINOPHIL # BLD AUTO: 0 K/UL (ref 0–0.5)
EOSINOPHIL # BLD AUTO: 0 K/UL (ref 0–0.5)
EOSINOPHIL NFR BLD: 0 % (ref 0–8)
EOSINOPHIL NFR BLD: 0 % (ref 0–8)
ERYTHROCYTE [DISTWIDTH] IN BLOOD BY AUTOMATED COUNT: 13.2 % (ref 11.5–14.5)
ERYTHROCYTE [DISTWIDTH] IN BLOOD BY AUTOMATED COUNT: 13.2 % (ref 11.5–14.5)
ERYTHROCYTE [SEDIMENTATION RATE] IN BLOOD BY WESTERGREN METHOD: 55 MM/HR (ref 0–36)
EST. GFR  (AFRICAN AMERICAN): >60 ML/MIN/1.73 M^2
EST. GFR  (NON AFRICAN AMERICAN): >60 ML/MIN/1.73 M^2
FERRITIN SERPL-MCNC: 295 NG/ML (ref 20–300)
GLUCOSE SERPL-MCNC: 194 MG/DL (ref 70–110)
HCT VFR BLD AUTO: 35.5 % (ref 37–48.5)
HCT VFR BLD AUTO: 38.5 % (ref 37–48.5)
HGB BLD-MCNC: 11.8 G/DL (ref 12–16)
HGB BLD-MCNC: 12.5 G/DL (ref 12–16)
IMM GRANULOCYTES # BLD AUTO: 0.02 K/UL (ref 0–0.04)
IMM GRANULOCYTES # BLD AUTO: 0.03 K/UL (ref 0–0.04)
IMM GRANULOCYTES NFR BLD AUTO: 0.4 % (ref 0–0.5)
IMM GRANULOCYTES NFR BLD AUTO: 0.6 % (ref 0–0.5)
INR PPP: 0.9 (ref 0.8–1.2)
LACTATE SERPL-SCNC: 1.1 MMOL/L (ref 0.5–2.2)
LYMPHOCYTES # BLD AUTO: 0.7 K/UL (ref 1–4.8)
LYMPHOCYTES # BLD AUTO: 0.8 K/UL (ref 1–4.8)
LYMPHOCYTES NFR BLD: 13.8 % (ref 18–48)
LYMPHOCYTES NFR BLD: 15.5 % (ref 18–48)
MAGNESIUM SERPL-MCNC: 1.8 MG/DL (ref 1.6–2.6)
MCH RBC QN AUTO: 30 PG (ref 27–31)
MCH RBC QN AUTO: 30.1 PG (ref 27–31)
MCHC RBC AUTO-ENTMCNC: 32.5 G/DL (ref 32–36)
MCHC RBC AUTO-ENTMCNC: 33.2 G/DL (ref 32–36)
MCV RBC AUTO: 91 FL (ref 82–98)
MCV RBC AUTO: 93 FL (ref 82–98)
MONOCYTES # BLD AUTO: 0.2 K/UL (ref 0.3–1)
MONOCYTES # BLD AUTO: 0.3 K/UL (ref 0.3–1)
MONOCYTES NFR BLD: 3.2 % (ref 4–15)
MONOCYTES NFR BLD: 5.1 % (ref 4–15)
NEUTROPHILS # BLD AUTO: 4 K/UL (ref 1.8–7.7)
NEUTROPHILS # BLD AUTO: 4 K/UL (ref 1.8–7.7)
NEUTROPHILS NFR BLD: 80.1 % (ref 38–73)
NEUTROPHILS NFR BLD: 80.5 % (ref 38–73)
NRBC BLD-RTO: 0 /100 WBC
NRBC BLD-RTO: 0 /100 WBC
PATH REV BLD -IMP: NORMAL
PHOSPHATE SERPL-MCNC: 3 MG/DL (ref 2.7–4.5)
PLATELET # BLD AUTO: 153 K/UL (ref 150–350)
PLATELET # BLD AUTO: 166 K/UL (ref 150–350)
PLATELET BLD QL SMEAR: ABNORMAL
PLATELET BLD QL SMEAR: ABNORMAL
PMV BLD AUTO: 11.1 FL (ref 9.2–12.9)
PMV BLD AUTO: 11.4 FL (ref 9.2–12.9)
POCT GLUCOSE: 157 MG/DL (ref 70–110)
POCT GLUCOSE: 172 MG/DL (ref 70–110)
POCT GLUCOSE: 212 MG/DL (ref 70–110)
POTASSIUM SERPL-SCNC: 4 MMOL/L (ref 3.5–5.1)
PROCALCITONIN SERPL IA-MCNC: 0.05 NG/ML
PROCALCITONIN SERPL IA-MCNC: 0.07 NG/ML
PROT SERPL-MCNC: 7.1 G/DL (ref 6–8.4)
PROTHROMBIN TIME: 10.5 SEC (ref 9–12.5)
RBC # BLD AUTO: 3.92 M/UL (ref 4–5.4)
RBC # BLD AUTO: 4.16 M/UL (ref 4–5.4)
SARS-COV-2 RDRP RESP QL NAA+PROBE: POSITIVE
SODIUM SERPL-SCNC: 139 MMOL/L (ref 136–145)
WBC # BLD AUTO: 4.94 K/UL (ref 3.9–12.7)
WBC # BLD AUTO: 4.98 K/UL (ref 3.9–12.7)

## 2020-11-10 PROCEDURE — 20600001 HC STEP DOWN PRIVATE ROOM

## 2020-11-10 PROCEDURE — 85025 COMPLETE CBC W/AUTO DIFF WBC: CPT

## 2020-11-10 PROCEDURE — 94761 N-INVAS EAR/PLS OXIMETRY MLT: CPT

## 2020-11-10 PROCEDURE — 94664 DEMO&/EVAL PT USE INHALER: CPT

## 2020-11-10 PROCEDURE — 99223 1ST HOSP IP/OBS HIGH 75: CPT | Mod: ,,, | Performed by: INTERNAL MEDICINE

## 2020-11-10 PROCEDURE — 99222 PR INITIAL HOSPITAL CARE,LEVL II: ICD-10-PCS | Mod: ,,, | Performed by: INTERNAL MEDICINE

## 2020-11-10 PROCEDURE — 99222 1ST HOSP IP/OBS MODERATE 55: CPT | Mod: ,,, | Performed by: INTERNAL MEDICINE

## 2020-11-10 PROCEDURE — 82550 ASSAY OF CK (CPK): CPT

## 2020-11-10 PROCEDURE — 27000221 HC OXYGEN, UP TO 24 HOURS

## 2020-11-10 PROCEDURE — 94640 AIRWAY INHALATION TREATMENT: CPT

## 2020-11-10 PROCEDURE — 96375 TX/PRO/DX INJ NEW DRUG ADDON: CPT

## 2020-11-10 PROCEDURE — 85610 PROTHROMBIN TIME: CPT

## 2020-11-10 PROCEDURE — 99900035 HC TECH TIME PER 15 MIN (STAT)

## 2020-11-10 PROCEDURE — 25000003 PHARM REV CODE 250: Performed by: HOSPITALIST

## 2020-11-10 PROCEDURE — 80053 COMPREHEN METABOLIC PANEL: CPT

## 2020-11-10 PROCEDURE — 85730 THROMBOPLASTIN TIME PARTIAL: CPT

## 2020-11-10 PROCEDURE — 83605 ASSAY OF LACTIC ACID: CPT

## 2020-11-10 PROCEDURE — 27000646 HC AEROBIKA DEVICE

## 2020-11-10 PROCEDURE — 63700000 PHARM REV CODE 250 ALT 637 W/O HCPCS: Performed by: INTERNAL MEDICINE

## 2020-11-10 PROCEDURE — 85652 RBC SED RATE AUTOMATED: CPT

## 2020-11-10 PROCEDURE — 63600175 PHARM REV CODE 636 W HCPCS: Performed by: INTERNAL MEDICINE

## 2020-11-10 PROCEDURE — 85060 BLOOD SMEAR INTERPRETATION: CPT | Mod: ,,, | Performed by: PATHOLOGY

## 2020-11-10 PROCEDURE — 82728 ASSAY OF FERRITIN: CPT

## 2020-11-10 PROCEDURE — 87040 BLOOD CULTURE FOR BACTERIA: CPT

## 2020-11-10 PROCEDURE — 96365 THER/PROPH/DIAG IV INF INIT: CPT

## 2020-11-10 PROCEDURE — 25000003 PHARM REV CODE 250: Performed by: FAMILY MEDICINE

## 2020-11-10 PROCEDURE — 84100 ASSAY OF PHOSPHORUS: CPT

## 2020-11-10 PROCEDURE — 85060 PATHOLOGIST REVIEW: ICD-10-PCS | Mod: ,,, | Performed by: PATHOLOGY

## 2020-11-10 PROCEDURE — 27100098 HC SPACER

## 2020-11-10 PROCEDURE — 99223 PR INITIAL HOSPITAL CARE,LEVL III: ICD-10-PCS | Mod: ,,, | Performed by: INTERNAL MEDICINE

## 2020-11-10 PROCEDURE — 84145 PROCALCITONIN (PCT): CPT

## 2020-11-10 PROCEDURE — 36415 COLL VENOUS BLD VENIPUNCTURE: CPT

## 2020-11-10 PROCEDURE — 83880 ASSAY OF NATRIURETIC PEPTIDE: CPT

## 2020-11-10 PROCEDURE — 25000003 PHARM REV CODE 250: Performed by: INTERNAL MEDICINE

## 2020-11-10 PROCEDURE — 94799 UNLISTED PULMONARY SVC/PX: CPT

## 2020-11-10 PROCEDURE — 82306 VITAMIN D 25 HYDROXY: CPT

## 2020-11-10 PROCEDURE — 63600175 PHARM REV CODE 636 W HCPCS: Performed by: FAMILY MEDICINE

## 2020-11-10 PROCEDURE — 25000242 PHARM REV CODE 250 ALT 637 W/ HCPCS: Performed by: INTERNAL MEDICINE

## 2020-11-10 PROCEDURE — 83735 ASSAY OF MAGNESIUM: CPT

## 2020-11-10 RX ORDER — DEXAMETHASONE SODIUM PHOSPHATE 100 MG/10ML
8 INJECTION INTRAMUSCULAR; INTRAVENOUS
Status: DISCONTINUED | OUTPATIENT
Start: 2020-11-10 | End: 2020-11-10

## 2020-11-10 RX ORDER — SODIUM CHLORIDE 0.9 % (FLUSH) 0.9 %
10 SYRINGE (ML) INJECTION
Status: DISCONTINUED | OUTPATIENT
Start: 2020-11-10 | End: 2020-11-14 | Stop reason: HOSPADM

## 2020-11-10 RX ORDER — ONDANSETRON 8 MG/1
8 TABLET, ORALLY DISINTEGRATING ORAL EVERY 8 HOURS PRN
Status: DISCONTINUED | OUTPATIENT
Start: 2020-11-10 | End: 2020-11-14 | Stop reason: HOSPADM

## 2020-11-10 RX ORDER — ASCORBIC ACID 500 MG
500 TABLET ORAL 2 TIMES DAILY
Status: DISCONTINUED | OUTPATIENT
Start: 2020-11-10 | End: 2020-11-14 | Stop reason: HOSPADM

## 2020-11-10 RX ORDER — CEFTRIAXONE 1 G/1
1 INJECTION, POWDER, FOR SOLUTION INTRAMUSCULAR; INTRAVENOUS
Status: DISCONTINUED | OUTPATIENT
Start: 2020-11-10 | End: 2020-11-14 | Stop reason: HOSPADM

## 2020-11-10 RX ORDER — DEXAMETHASONE SODIUM PHOSPHATE 10 MG/ML
8 INJECTION INTRAMUSCULAR; INTRAVENOUS
Status: COMPLETED | OUTPATIENT
Start: 2020-11-10 | End: 2020-11-10

## 2020-11-10 RX ORDER — ACETAMINOPHEN 325 MG/1
650 TABLET ORAL EVERY 4 HOURS PRN
Status: DISCONTINUED | OUTPATIENT
Start: 2020-11-10 | End: 2020-11-14 | Stop reason: HOSPADM

## 2020-11-10 RX ORDER — TALC
6 POWDER (GRAM) TOPICAL NIGHTLY PRN
Status: DISCONTINUED | OUTPATIENT
Start: 2020-11-10 | End: 2020-11-10

## 2020-11-10 RX ORDER — AZITHROMYCIN 250 MG/1
500 TABLET, FILM COATED ORAL
Status: COMPLETED | OUTPATIENT
Start: 2020-11-10 | End: 2020-11-11

## 2020-11-10 RX ORDER — PROMETHAZINE HYDROCHLORIDE 25 MG/1
25 SUPPOSITORY RECTAL EVERY 6 HOURS PRN
Status: DISCONTINUED | OUTPATIENT
Start: 2020-11-10 | End: 2020-11-14 | Stop reason: HOSPADM

## 2020-11-10 RX ORDER — IBUPROFEN 200 MG
24 TABLET ORAL
Status: DISCONTINUED | OUTPATIENT
Start: 2020-11-10 | End: 2020-11-14 | Stop reason: HOSPADM

## 2020-11-10 RX ORDER — ENOXAPARIN SODIUM 100 MG/ML
40 INJECTION SUBCUTANEOUS EVERY 24 HOURS
Status: DISCONTINUED | OUTPATIENT
Start: 2020-11-10 | End: 2020-11-10

## 2020-11-10 RX ORDER — INSULIN ASPART 100 [IU]/ML
0-5 INJECTION, SOLUTION INTRAVENOUS; SUBCUTANEOUS
Status: DISCONTINUED | OUTPATIENT
Start: 2020-11-10 | End: 2020-11-14 | Stop reason: HOSPADM

## 2020-11-10 RX ORDER — ALBUTEROL SULFATE 90 UG/1
2 AEROSOL, METERED RESPIRATORY (INHALATION) EVERY 6 HOURS
Status: DISCONTINUED | OUTPATIENT
Start: 2020-11-10 | End: 2020-11-14 | Stop reason: HOSPADM

## 2020-11-10 RX ORDER — FOLIC ACID 1 MG/1
1 TABLET ORAL DAILY
Status: DISCONTINUED | OUTPATIENT
Start: 2020-11-10 | End: 2020-11-14 | Stop reason: HOSPADM

## 2020-11-10 RX ORDER — CEFTRIAXONE 1 G/1
INJECTION, POWDER, FOR SOLUTION INTRAMUSCULAR; INTRAVENOUS
Status: DISCONTINUED
Start: 2020-11-10 | End: 2020-11-10 | Stop reason: HOSPADM

## 2020-11-10 RX ORDER — GLUCAGON 1 MG
1 KIT INJECTION
Status: DISCONTINUED | OUTPATIENT
Start: 2020-11-10 | End: 2020-11-14 | Stop reason: HOSPADM

## 2020-11-10 RX ORDER — BENZONATATE 100 MG/1
100 CAPSULE ORAL 3 TIMES DAILY PRN
Status: DISCONTINUED | OUTPATIENT
Start: 2020-11-10 | End: 2020-11-14 | Stop reason: HOSPADM

## 2020-11-10 RX ORDER — IBUPROFEN 200 MG
16 TABLET ORAL
Status: DISCONTINUED | OUTPATIENT
Start: 2020-11-10 | End: 2020-11-14 | Stop reason: HOSPADM

## 2020-11-10 RX ORDER — CHOLECALCIFEROL (VITAMIN D3) 25 MCG
1000 TABLET ORAL DAILY
Status: DISCONTINUED | OUTPATIENT
Start: 2020-11-10 | End: 2020-11-14 | Stop reason: HOSPADM

## 2020-11-10 RX ORDER — PANTOPRAZOLE SODIUM 40 MG/1
40 TABLET, DELAYED RELEASE ORAL DAILY
Status: DISCONTINUED | OUTPATIENT
Start: 2020-11-10 | End: 2020-11-14 | Stop reason: HOSPADM

## 2020-11-10 RX ORDER — GUAIFENESIN/DEXTROMETHORPHAN 100-10MG/5
10 SYRUP ORAL EVERY 4 HOURS PRN
Status: DISCONTINUED | OUTPATIENT
Start: 2020-11-10 | End: 2020-11-14 | Stop reason: HOSPADM

## 2020-11-10 RX ORDER — LISINOPRIL 20 MG/1
20 TABLET ORAL DAILY
Status: DISCONTINUED | OUTPATIENT
Start: 2020-11-10 | End: 2020-11-14 | Stop reason: HOSPADM

## 2020-11-10 RX ADMIN — CHOLECALCIFEROL TAB 25 MCG (1000 UNIT) 1000 UNITS: 25 TAB at 09:11

## 2020-11-10 RX ADMIN — PANTOPRAZOLE SODIUM 40 MG: 40 TABLET, DELAYED RELEASE ORAL at 12:11

## 2020-11-10 RX ADMIN — BENZONATATE 100 MG: 100 CAPSULE ORAL at 08:11

## 2020-11-10 RX ADMIN — Medication 10 ML: at 01:11

## 2020-11-10 RX ADMIN — REMDESIVIR 200 MG: 100 INJECTION, POWDER, LYOPHILIZED, FOR SOLUTION INTRAVENOUS at 05:11

## 2020-11-10 RX ADMIN — DEXAMETHASONE 6 MG: 4 TABLET ORAL at 09:11

## 2020-11-10 RX ADMIN — CEFTRIAXONE SODIUM 1 G: 1 INJECTION, POWDER, FOR SOLUTION INTRAMUSCULAR; INTRAVENOUS at 05:11

## 2020-11-10 RX ADMIN — OXYCODONE HYDROCHLORIDE AND ACETAMINOPHEN 500 MG: 500 TABLET ORAL at 08:11

## 2020-11-10 RX ADMIN — AZITHROMYCIN MONOHYDRATE 500 MG: 250 TABLET ORAL at 05:11

## 2020-11-10 RX ADMIN — ALBUTEROL SULFATE 2 PUFF: 90 AEROSOL, METERED RESPIRATORY (INHALATION) at 12:11

## 2020-11-10 RX ADMIN — LISINOPRIL 20 MG: 20 TABLET ORAL at 01:11

## 2020-11-10 RX ADMIN — ALBUTEROL SULFATE 2 PUFF: 90 AEROSOL, METERED RESPIRATORY (INHALATION) at 07:11

## 2020-11-10 RX ADMIN — DEXAMETHASONE SODIUM PHOSPHATE 8 MG: 10 INJECTION INTRAMUSCULAR; INTRAVENOUS at 01:11

## 2020-11-10 RX ADMIN — CEFTRIAXONE 1 G: 1 INJECTION, SOLUTION INTRAVENOUS at 02:11

## 2020-11-10 RX ADMIN — FOLIC ACID 1 MG: 1 TABLET ORAL at 12:11

## 2020-11-10 RX ADMIN — AZITHROMYCIN MONOHYDRATE 500 MG: 500 INJECTION, POWDER, LYOPHILIZED, FOR SOLUTION INTRAVENOUS at 02:11

## 2020-11-10 RX ADMIN — INSULIN ASPART 2 UNITS: 100 INJECTION, SOLUTION INTRAVENOUS; SUBCUTANEOUS at 09:11

## 2020-11-10 NOTE — NURSING
Per night RN, pt failed swallow study. Pt is asleep, but easily arousable. Follows simple commands. No drooling noted as reported. Reattempted nursing swallow study with pt. Pt able to swallow water with no difficulty. No coughing or repeated clearing of the throat noted.

## 2020-11-10 NOTE — NURSING
0430 Attempted to complete admission assessment with patient but patient unable to provide answers to questions when prompted. Patient's responses are extremely delayed and patient sometimes laughs and stares blankly when prompted - Dr. Bermudez notified and aware. Patient's son, Zain, notified of her arrival to unit.    0700 Nurse bedside swallow done and patient failed. Throat clearing, coughing and drooling noted. Patient unable to swallow on her own without assistance. WENDY documented. SLP consult placed. Patient NPO until further evaluation - Dr. Preston notified via Sec[employer] Message and is aware.

## 2020-11-10 NOTE — PLAN OF CARE
(Physician in Lead of Transfers)   Outside Transfer Acceptance Note / Regional Referral Center      Transferring Physician: Krystian Machado MD    Accepting Physician: Alyx Langley MD    Date of Acceptance: 11/10/2020    Transferring Facility: [facility]    Reason for Transfer: COVID +    Report from Transferring Physician/Hospital course:  Patient is a 80 y.o. female who has a past medical history of Diabetes mellitus, type 2, Hypertension, Reflux esophagitis, and Seasonal allergies presented with confusion, fever, and shortness of breath. Patient presented to [place] ED and was found to have fever of 101 and hypoxia satting 88-89% on RA. Chest xray with bilateral infiltrates. CT head with no acute changes. Lactic acid 1.3. COVID positive. Patient treated with IV Rocephin and azithromycin. Also given IV steroids. BP running in low 100's systolic improved after IVF's. Facility seeking transfer. No COVID beds available on [facility].     Vital Signs (Most Recent):  Temp: 99.4 °F (37.4 °C) (11/09/20 2340)  Pulse: 83 (11/10/20 0115)  Resp: 11 (11/10/20 0115)  BP: (!) 114/51 (11/10/20 0115)  SpO2: 96 % (11/10/20 0115) Vital Signs (24h Range):  Temp:  [99.4 °F (37.4 °C)-101.4 °F (38.6 °C)] 99.4 °F (37.4 °C)  Pulse:  [81-97] 83  Resp:  [9-24] 11  SpO2:  [88 %-96 %] 96 %  BP: (101-131)/(49-99) 114/51       Labs & Radiographs: see EPIC    Significant Labs:   CMP:   Recent Labs   Lab 11/09/20  2142   *   K 3.7   CL 98   CO2 24   *   BUN 20   CREATININE 0.6   CALCIUM 8.5*   PROT 7.5   ALBUMIN 3.7   BILITOT 0.6   ALKPHOS 40*   AST 25   ALT 17   ANIONGAP 12   ESTGFRAFRICA >60.0   EGFRNONAA >60.0   , CBC:   Recent Labs   Lab 11/09/20  2142   WBC 7.31   HGB 12.5   HCT 37.5   PLT 41*   , INR: No results for input(s): INR, PROTIME in the last 48 hours., Lipid Panel No results for input(s): CHOL, HDL, LDLCALC, TRIG, CHOLHDL in the last 48 hours. and Troponin   Recent Labs   Lab 11/09/20  2142   TROPONINI  0.06       Significant Imaging:   X-Ray Shoulder 2 or more views Bilat  See office visit note for XRAY Report/Interpretation dictation.     This procedure was auto-finalized.      To Do List:   1. Admit to  COVID unit  2. COVID precautions  3. Management per COVID protocol     Upon patient arrival to floor, please call extension [phone] (if no answer, this will flip to a beeper, so enter your call back number) for Hospital Medicine admit team assignment and for additional admit orders for the patient.  Do not page the attending physician associated with the patient on arrival (this physician may not be on duty at the time of arrival).  Rather, always call [phone] to reach the triage physician for orders and team assignment.      Alyx Langley MD  Hospital Medicine Staff

## 2020-11-10 NOTE — ED PROVIDER NOTES
Encounter Date: 11/9/2020       History     Chief Complaint   Patient presents with    Fever    Altered Mental Status     Patient comes our facility by EMS.  Reportedly family members called EMS in reported that the patient had altered mental status.  The duration of this altered mental status is unknown to me.  Patient does have a reported history of being COVID positive dating about 4 days prior on a test outside of our facility.  Patient arrived with temperature 101.4°.  Her oxygen saturations were in the upper 80s to lower 90s on room air.  Patient has some increased work of breathing.  She had no complaints that she was altered unable to really answer any questions.  Multiple family members in her household had the COVID infection.        Review of patient's allergies indicates:   Allergen Reactions    Adhesive     Ciprofloxacin     Escitalopram oxalate     Pregabalin     Penicillin      Past Medical History:   Diagnosis Date    Diabetes mellitus, type 2     Hypertension     Reflux esophagitis     Seasonal allergies      Past Surgical History:   Procedure Laterality Date    BACK SURGERY      EPIDURAL STEROID INJECTION N/A 6/18/2020    Procedure: ARACELY C7-T1;  Surgeon: Nicholas Pardo MD;  Location: [facility] OR;  Service: Pain Management;  Laterality: N/A;    EPIDURAL STEROID INJECTION INTO CERVICAL SPINE N/A 9/30/2019    Procedure: Injection-steroid-epidural-cervical;  Surgeon: Nicholas Pardo MD;  Location: [facility] OR;  Service: Pain Management;  Laterality: N/A;  C7-T1    KNEE SURGERY      ovarian tumor      SHOULDER ARTHROSCOPY       History reviewed. No pertinent family history.  Social History     Tobacco Use    Smoking status: Never Smoker    Smokeless tobacco: Never Used   Substance Use Topics    Alcohol use: Yes     Comment: occasionally    Drug use: No     Review of Systems   Unable to perform ROS: Mental status change       Physical Exam     Initial Vitals [11/09/20 2039]   BP Pulse Resp  Temp SpO2   131/69 95 (!) 24 (!) 101.4 °F (38.6 °C) 95 %      MAP       --         Physical Exam    Nursing note and vitals reviewed.  Constitutional: She appears well-developed and well-nourished. She is not diaphoretic. No distress.   HENT:   Head: Normocephalic and atraumatic.   Nose: Nose normal.   Mouth/Throat: No oropharyngeal exudate.   Eyes: Conjunctivae and EOM are normal. Pupils are equal, round, and reactive to light. Right eye exhibits no discharge. Left eye exhibits no discharge. No scleral icterus.   Neck: Normal range of motion. Neck supple. No thyromegaly present. No tracheal deviation present.   Cardiovascular: Normal rate, regular rhythm, normal heart sounds and intact distal pulses.   No murmur heard.  Pulmonary/Chest: Breath sounds normal. No respiratory distress. She has no wheezes. She has no rhonchi. She has no rales. She exhibits no tenderness.   Abdominal: Soft. Bowel sounds are normal. She exhibits no distension. There is no abdominal tenderness. There is no rebound.   Musculoskeletal: Normal range of motion. No tenderness or edema.   Lymphadenopathy:     She has no cervical adenopathy.   Neurological: She is alert. She has normal strength. No cranial nerve deficit.   gcs 14   Skin: Skin is warm. Capillary refill takes less than 2 seconds. No abscess noted. No erythema. No pallor.   Psychiatric:   Patient is confused and I am unable to assess this category         ED Course   Procedures  Labs Reviewed   CBC W/ AUTO DIFFERENTIAL - Abnormal; Notable for the following components:       Result Value    Platelets 41 (*)     All other components within normal limits   COMPREHENSIVE METABOLIC PANEL - Abnormal; Notable for the following components:    Sodium 134 (*)     Glucose 159 (*)     Calcium 8.5 (*)     Alkaline Phosphatase 40 (*)     All other components within normal limits   URINALYSIS, REFLEX TO URINE CULTURE - Abnormal; Notable for the following components:    Protein, UA 2+ (*)      Ketones, UA 1+ (*)     All other components within normal limits    Narrative:     Specimen Source->Urine   URINALYSIS MICROSCOPIC - Abnormal; Notable for the following components:    WBC, UA 8 (*)     Bacteria Moderate (*)     All other components within normal limits    Narrative:     Specimen Source->Urine   SARS-COV-2 RNA AMPLIFICATION, QUAL - Abnormal; Notable for the following components:    SARS-CoV-2 RNA, Amplification, Qual Positive (*)     All other components within normal limits   INFLUENZA A & B BY MOLECULAR   CULTURE, BLOOD   CULTURE, BLOOD   MAGNESIUM   TROPONIN I   TSH   T4, FREE   AMMONIA   LACTIC ACID, PLASMA   DRUG SCREEN PANEL, URINE EMERGENCY    Narrative:     Specimen Source->Urine   PROCALCITONIN          Imaging Results          CT Head Without Contrast (In process)                X-Ray Chest AP Portable (In process)               X-Rays:   Independently Interpreted Readings:   Other Readings:  Chest x-ray shows chronic changes but no acute infiltrates, effusions or ground-glass changes.  Cardiac silhouette within normal limits.    Medical Decision Making:   Patient presents our facility with an altered mental status.  She is known to be COVID positive.  Head CT was negative.  Patient has no obvious infiltrates on chest x-ray in order she have a bladder infection.  Ammonia as well as thyroid panels are within normal limits.  Electrolytes are within normal limits.  We will address patient's acute problems with her COVID in reassess her mental status.  Patient has shown some mild improvement of her mental status with therapy provided in ER.  Case was discussed with hospitalist and patient will need to be transferred to facility due to her COVID positive status.                             Clinical Impression:     ICD-10-CM ICD-9-CM   1. Pneumonia due to COVID-19 virus  U07.1 480.8    J12.89    2. HTN (hypertension)  I10 401.9   3. Altered mental status, unspecified altered mental status type   R41.82 780.97                      Disposition:   Disposition: Transferred  Condition: Stable     ED Disposition Condition    Transfer to Another Facility Stable                            Krystian Machado MD  11/10/20 0218

## 2020-11-10 NOTE — PLAN OF CARE
11/10/20 1102   Post-Acute Status   Post-Acute Authorization Other   Other Status See Comments     SW following patient dc planning needs, Post acute needs to be determined. SW will continue to follow up.      Renee Abrams LMSW  [facility]   [id number]

## 2020-11-10 NOTE — NURSING
Patient arrived to unit at this time, escorted by EMS via stretcher. A&Ox3 with periods of confusion and delayed responses. Oriented to self, place and situation. Disoriented to time and subtle confusion noted in conversation. Follows commands and SCOTT. Denies CP, SOB or dizziness. Dyspnea on exertion noted. VSS on 2L NC. Med Team notified of patient's arrival. Patient unable to participate in admission assessment due to delays in responses. Will re-attempt admission assessment. Patient's son, Zain Stack, to be notified of her arrival. All belongings accounted for. Will continue to monitor.

## 2020-11-10 NOTE — ED NOTES
Bed: Exam 10  Expected date: 11/9/20  Expected time: 8:38 PM  Means of arrival: Ambulance Service  Comments:

## 2020-11-10 NOTE — H&P
"Hospital Medicine  History and Physical  [facility]      Patient Name: Brittney Freitas  MRN:  [id number]  Hospital Medicine Team: [facility] R Ernesto Bermudez MD  Date of Admission:  11/10/2020     Length of Stay:  LOS: 0 days     Principal Problem: Suspected Covid-19 Virus Infection    Chief complaint  SHORTNESS OF BREATH    HPI  79 yo female with DM2, HTN, allergies presenting for confusion, fever and shortness of breath. She does not recall how long she was confused for but she tested positive for COVID about 4 days ago. Today she had a temperature of 101.4 at OSH, some increased work of breathing and desatted to upper 80s on room air. Reportedly multiple family members tested positive for COVID. She is somewhat slow to respond to answers and occasionally sure on time.     As per  note, "Patient is a 80 y.o. female who has a past medical history of Diabetes mellitus, type 2, Hypertension, Reflux esophagitis, and Seasonal allergies presented with confusion, fever, and shortness of breath. Patient presented to [facility] ED and was found to have fever of 101 and hypoxia satting 88-89% on RA. Chest xray with bilateral infiltrates. CT head with no acute changes. Lactic acid 1.3. COVID positive. Patient treated with IV Rocephin and azithromycin. Also given IV steroids. BP running in low 100's systolic improved after IVF's. Facility seeking transfer. No COVID beds available on [facility]. "      Review of Systems    Constitutional: Positive for fever, chills, fatigue  HENT: negative for sore throat, negative for trouble swallowing.    Eyes: Negative for photophobia, visual disturbance.   Respiratory: Positive for cough, shortness of breath  Cardiovascular: Negative for chest pain, palpitations, leg swelling.   Gastrointestinal: Negative for diarrhea. Negative for abdominal pain, constipation, nausea, vomiting.   Endocrine: Negative for cold intolerance, heat intolerance.   Genitourinary: " Negative for dysuria, frequency.   Musculoskeletal: Negative for arthralgias, myalgias.   Skin: Negative for rash  Neurological: Negative for dizziness, syncope, light-headedness.   Psychiatric/Behavioral: Negative for confusion, hallucinations, anxiety    Past Medical History:   Diagnosis Date    Diabetes mellitus, type 2     Hypertension     Reflux esophagitis     Seasonal allergies      Past Surgical History:   Procedure Laterality Date    BACK SURGERY      EPIDURAL STEROID INJECTION N/A 6/18/2020    Procedure: ARACELY C7-T1;  Surgeon: Nicholas Pardo MD;  Location: [facility] OR;  Service: Pain Management;  Laterality: N/A;    EPIDURAL STEROID INJECTION INTO CERVICAL SPINE N/A 9/30/2019    Procedure: Injection-steroid-epidural-cervical;  Surgeon: Nicholas Pardo MD;  Location: [facility] OR;  Service: Pain Management;  Laterality: N/A;  C7-T1    KNEE SURGERY      ovarian tumor      SHOULDER ARTHROSCOPY       No family history on file.  Social History     Socioeconomic History    Marital status:      Spouse name: Not on file    Number of children: Not on file    Years of education: Not on file    Highest education level: Not on file   Occupational History    Not on file   Social Needs    Financial resource strain: Not on file    Food insecurity     Worry: Not on file     Inability: Not on file    Transportation needs     Medical: Not on file     Non-medical: Not on file   Tobacco Use    Smoking status: Never Smoker    Smokeless tobacco: Never Used   Substance and Sexual Activity    Alcohol use: Yes     Comment: occasionally    Drug use: No    Sexual activity: Not Currently   Lifestyle    Physical activity     Days per week: Not on file     Minutes per session: Not on file    Stress: Not on file   Relationships    Social connections     Talks on phone: Not on file     Gets together: Not on file     Attends [employer] service: Not on file     Active member of club or organization: Not on file      Attends meetings of clubs or organizations: Not on file     Relationship status: Not on file   Other Topics Concern    Not on file   Social History Narrative    Not on file       Medications  Current Facility-Administered Medications on File Prior to Encounter   Medication Dose Route Frequency Provider Last Rate Last Dose    [COMPLETED] 0.9%  NaCl infusion  1,000 mL Intravenous ED 1 Time Krystian Machado MD   Stopped at 11/09/20 2355    [COMPLETED] acetaminophen tablet 650 mg  650 mg Oral ED 1 Time Krystian Machado MD   650 mg at 11/09/20 2335    [COMPLETED] azithromycin 500 mg in dextrose 5 % 250 mL IVPB (ready to mix system)  500 mg Intravenous ED 1 Time Krystian Machado  mL/hr at 11/10/20 0202 500 mg at 11/10/20 0202    [COMPLETED] cefTRIAXone (ROCEPHIN) 1 g/50 mL D5W IVPB  1 g Intravenous ED 1 Time Krystian Machado MD   Stopped at 11/10/20 0251    [COMPLETED] dexAMETHasone sodium phos (PF) injection 8 mg  8 mg Intravenous ED 1 Time Krystian Machado MD   8 mg at 11/10/20 0159    lactated ringers infusion   Intravenous Continuous Nicholas Pardo MD 25 mL/hr at 09/30/19 1439      [COMPLETED] sodium chloride 0.9% bolus 1,000 mL  1,000 mL Intravenous ED 1 Time Krystian Machado MD   1,000 mL at 11/09/20 2334    [DISCONTINUED] dexamethasone injection 8 mg  8 mg Intravenous ED 1 Time Krystian Machado MD         Current Outpatient Medications on File Prior to Encounter   Medication Sig Dispense Refill    busPIRone (BUSPAR) 10 MG tablet Take 10 mg by mouth 3 (three) times daily.      calcium carbonate (OS-IVIS) 600 mg (1,500 mg) Tab Take 600 mg by mouth 2 (two) times daily with meals.      cetirizine (ZYRTEC) 10 MG tablet Take 10 mg by mouth once daily.      diclofenac (VOLTAREN) 0.1 % ophthalmic solution 1 drop 4 (four) times daily.      diclofenac sodium (VOLTAREN) 1 % Gel Apply 2 g topically 3 (three) times daily. 200 g 2    DULoxetine (CYMBALTA) 60 MG capsule Take 60 mg by mouth  once daily.      ergocalciferol (ERGOCALCIFEROL) 50,000 unit Cap Take 50,000 Units by mouth every 7 days.      escitalopram oxalate (LEXAPRO) 20 MG tablet Take 20 mg by mouth once daily.      evolocumab (REPATHA SURECLICK SUBQ) Inject into the skin.      fluticasone propionate (FLONASE) 50 mcg/actuation nasal spray   See Instructions, USE 1 SPRAY IN EACH NOSTRIL DAILY, gm, 7 Refill(s), # 48, eRx: [facility]      folic acid (FOLVITE) 1 MG tablet Take 1 mg by mouth once daily.      gabapentin (NEURONTIN) 100 MG capsule Take 3 capsules (300 mg total) by mouth 3 (three) times daily. 270 capsule 1    HYDROcodone-acetaminophen (NORCO) 5-325 mg per tablet Take 1 tablet by mouth every 12 (twelve) hours as needed for Pain. (Patient not taking: Reported on 9/22/2020) 60 tablet 0    HYDROcodone-acetaminophen (NORCO) 5-325 mg per tablet Take 1 tablet by mouth every 8 (eight) hours as needed for Pain. 90 tablet 0    lisinopril (PRINIVIL,ZESTRIL) 20 MG tablet Take 20 mg by mouth once daily.      montelukast (SINGULAIR) 10 mg tablet Take 10 mg by mouth every evening.      pantoprazole (PROTONIX) 40 MG tablet Take 40 mg by mouth once daily.      QUEtiapine (SEROQUEL) 25 MG Tab Take by mouth.      traMADol (ULTRAM) 50 mg tablet Take 1 tablet (50 mg total) by mouth every 8 (eight) hours as needed for Pain. (Patient not taking: Reported on 9/22/2020) 30 tablet 0    vit C/E/Zn/coppr/lutein/zeaxan (PRESERVISION AREDS-2 ORAL) Take by mouth.         Allergies  Adhesive, Ciprofloxacin, Escitalopram oxalate, Pregabalin, and Penicillin    Physical Examination  Temp:  [99.4 °F (37.4 °C)-101.4 °F (38.6 °C)]   Pulse:  [75-97]   Resp:  [9-24]   BP: ()/(45-99)   SpO2:  [88 %-98 %]     Gen: NAD, conversant; but very slow on answers  Head: NC, AT  Eyes: PERRLA, EOMI  Throat: MMM, OP clear  CV: RRR, no M/R/G, no peripheral edema, no JVD  Resp: coarse bilateral breath sounds, no increased work of breathing on  2L  GI: Soft, NT, ND, +BS  Ext: MAEW, no c/c/e  Neuro: AAOx2, does not know time, CN grossly intact, no focal neurologic deficits  Psychiatry: Normal mood, normal affect    Laboratory:  Recent Labs   Lab 11/09/20 2142   WBC 7.31   LYMPH 21.1  1.5   HGB 12.5   HCT 37.5   PLT 41*     Recent Labs   Lab 11/09/20 2142   *   K 3.7   CL 98   CO2 24   BUN 20   CREATININE 0.6   *   CALCIUM 8.5*   MG 1.8     Recent Labs   Lab 11/09/20 2142   ALKPHOS 40*   ALT 17   AST 25   ALBUMIN 3.7   PROT 7.5   BILITOT 0.6      Recent Labs     11/09/20 2142   TROPONINI 0.06   LACTATE 1.3       All labs within the last 24 hours were reviewed.     Microbiology:  Lab Results   Component Value Date    [id number] Positive (A) 11/09/2020       Microbiology Results (last 7 days)     Procedure Component Value Units Date/Time    Blood culture (site 1) [[id number]]     Order Status: Sent Specimen: Blood     Blood culture (site 2) [[id number]]     Order Status: Sent Specimen: Blood             Imaging      No results found for this or any previous visit.    X-Ray Shoulder 2 or more views Bilat  See office visit note for XRAY Report/Interpretation dictation.     This procedure was auto-finalized.      All imaging within the last 24 hours was reviewed.       Assessment and Plan:    Active Hospital Problems    Diagnosis  POA    *COVID-19 [U07.1]  Yes    Acute hypoxemic respiratory failure [J96.01]  Yes    Pneumonia due to COVID-19 virus [U07.1, J12.89]  Yes    Osteoporosis [M81.0]  Yes    Type 2 diabetes mellitus [E11.9]  Yes    Benign hypertension [I10]  Yes    Impaired cognition [R41.89]  Yes    Adjustment disorder with mixed emotional features [F43.29]  Yes      Resolved Hospital Problems   No resolved problems to display.       Suspected COVID-19 Virus Infection  Viral Pneumonia due to COVID-19  - COVID-19 testing: Collection Date: 6/15/2020 Collection Time:   4:23 PM   - Isolation: Airborne/Droplet. Surgical mask on  patient. Notify Infection Control  - Diagnostics: Trend Q48hrs if stable, more frequently if patient decompensating     Laboratory/Study Frequency Result   CBC Admit & Q48h    CMP Admit & Q48h    Magnesium level Admit    D-dimer Admit & Q48h    Ferritin Admit & Q48h    CRP Admit & Q48h    CPK Admit & Q24h if elevated    LDH Admit & Q48h    Vitamin D Admit    BNP Admit    Troponin Admit & Q6h if elevated    Glucose-6-phos dehydrogenase Admit    Procalcitonin Admit    Lipid Panel If starting statin    Rapid influenza Admit    Resp Infection Panel Admit if BMT/organ transplant    Legionella Antigen Admit    Sputum Culture Admit    Blood Culture Admit    Urinalysis & Culture Admit    ECG Admit & Q48h if on HCQ    CXR Admit    Lymphopenia, hyponatremia, hyperferritinemia, elevated troponin, elevated d-dimer, age, and medical comorbidities are significant predictors of poor clinical outcome    - Management: Per [facility] COVID Treatment Protocol (4/15/20)    - Monitoring:   - Telemetry & Continuous Pulse Oximetry    - Nutrition:    - Multivitamin PO daily   - Add Boost supplement   - Vitamin D 1000IU daily if deficient   - Ascorbic acid 500mg PO bid    - Supportive Care:   - acetaminophen 650mg PO Q6hr PRN fever/headache   - loperamide PRN viral diarrhea   - IVF if indicated, restrictive strategy preferred, no maintenance IV if able   - VTE PPx: enoxaparin or heparin SQ unless contraindicated    - Antibiotics:  - if indications, CXR findings, elevated procal. See protocol for alternatives.   - Discontinue early if low concern for bacterial co-infection   - ceftriaxone 1g Q24h x 5 days  - azithromycin 500mg po x1, then 250mg po daily x 4 days    - Investigational Therapies:   - If patient meets criteria    Acute Hypoxemic Respiratory Failure  - Order RT consult via Respiratory Communication for COVID Protocols  - Order Incentive Spirometer Q4h  - Order Flutter Valve Q4h  - Continuous Pulse Oximetry  - Goal SpO2 92-96%  -  Supplemental O2 via LFNC, VentiMask, or HFNC (see Respiratory Support Oxygen Therapies)  - If wheezing, albuterol INH Q6h scheduled & PRN  - Proning Protocol if patient is a candidate (see HM Proning Protocol)   - GCS >13, able to self-prone  - If deterioration, may warrant trial of NIPPV and transfer to [facility] pressure room or immediate ICU consult    HTN- hold home medications 2/2 hypotension, institute back as needed    Encephalopathy  - could 2/2 acute infection, fever, polypharmacy or could be baseline  - will follow along    Chronic pain- hold chronic pain medications 2/2 confusion, add back as needed    Mood disorder- hold home medications as currently altered.     Thrombocytopenia- history of ITP, will consult heme/onc consult; no active signs of bleeding; holding lovenox; check PT/inr,ptt    Advance Care Planning  Goals of care, counseling/discussion full code        VTE High Risk Prophylaxis: holding 2/2 thrombocytopenia  VTE Risk Mitigation (From admission, onward)         Ordered     Place sequential compression device  Until discontinued      11/10/20 0510     IP VTE HIGH RISK PATIENT  Once      11/10/20 9623              Ernesto Bermudez MD  Hospital Medicine Staff  11/10/2020

## 2020-11-10 NOTE — CONSULTS
Consult Note                                         Inpatient consult to Hematology/Oncology  Consult performed by: Jose L Mccabe MD  Consult ordered by: Ernesto Bermudez MD        SUBJECTIVE:     History of Present Illness:    Patient is active COVID19 positive, confused, disoriented most of the information taken by chart check    Ms. Freitas is a 80-year-old female with past medical history of ITP, type 2 diabetes mellitus, hypertension, tested positive for COVID 4 days ago transferred to [facility] for higher level of care    She initially presented to [place] emergency department and was found to have a fever of 101, hypoxia desaturating to the 88% on room air.  Chest x-ray revealed bilateral infiltrates and CT head with no acute changes.  She was tested positive for COVID-19 and was treated with IV Rocephin and azithromycin along with IV steroids.  That facility seek transferred to [facility] at there are no COVID-19 beds available on the [place].  Has evidence from outside hospital records she had a temperature of 101.4°, increased workup breathing, desaturations to upper 80s on room air.  Reportedly multiple family members tested positive for COVID-19, however patient is currently very slow to respond and appeared disoriented at this point of time.    Hematology consulted for low platelet count of 41 K and history of ITP.  As per the records she was following a hematologist at [place].    Review of patient's allergies indicates:   Allergen Reactions    Adhesive     Ciprofloxacin     Escitalopram oxalate     Pregabalin     Penicillin      Past Medical History:   Diagnosis Date    Diabetes mellitus, type 2     Hypertension     Reflux esophagitis     Seasonal allergies      Past Surgical History:   Procedure Laterality Date    BACK SURGERY      EPIDURAL STEROID INJECTION N/A 6/18/2020    Procedure: ARACELY  C7-T1;  Surgeon: Nicholas Pardo MD;  Location: [facility] OR;  Service: Pain Management;  Laterality: N/A;    EPIDURAL STEROID INJECTION INTO CERVICAL SPINE N/A 9/30/2019    Procedure: Injection-steroid-epidural-cervical;  Surgeon: Nicholas Pardo MD;  Location: [facility] OR;  Service: Pain Management;  Laterality: N/A;  C7-T1    KNEE SURGERY      ovarian tumor      SHOULDER ARTHROSCOPY       No family history on file.  Social History     Tobacco Use    Smoking status: Never Smoker    Smokeless tobacco: Never Used   Substance Use Topics    Alcohol use: Yes     Comment: occasionally    Drug use: No     Review of Systems   Constitutional: Positive for chills, fever and malaise/fatigue.   HENT: Negative for nosebleeds.    Respiratory: Positive for shortness of breath. Negative for hemoptysis.    Cardiovascular: Negative for chest pain and palpitations.   Gastrointestinal: Negative for abdominal pain, blood in stool and vomiting.   Genitourinary: Negative for hematuria.   Musculoskeletal: Negative for falls.   Skin: Negative for rash.   Neurological: Negative for focal weakness and headaches.   Psychiatric/Behavioral: Positive for memory loss. Negative for substance abuse.     OBJECTIVE:     Vital Signs:  Temp:  [99 °F (37.2 °C)-99.4 °F (37.4 °C)]   Pulse:  [75-97]   Resp:  [9-24]   BP: ()/(45-99)   SpO2:  [88 %-100 %]     Physical Exam   Deffered due to active COVID19 infection    Laboratory:  CBC:   Recent Labs   Lab 11/09/20  2142   WBC 7.31   RBC 4.16   HGB 12.5   HCT 37.5   PLT 41*   MCV 90   MCH 30.0   MCHC 33.3     CMP:   Recent Labs   Lab 11/09/20  2142   *   CALCIUM 8.5*   ALBUMIN 3.7   PROT 7.5   *   K 3.7   CO2 24   CL 98   BUN 20   CREATININE 0.6   ALKPHOS 40*   ALT 17   AST 25   BILITOT 0.6     Coagulation:   Recent Labs   Lab 11/10/20  0734   LABPROT 10.5   INR 0.9   APTT 30.7         Diagnostic Results:      ASSESSMENT/PLAN:     1. Thrombocytopenia likely multifactorial:  Her platelet  count is 41 K likely secondary to myelosuppression and peripheral destruction due to sepsis/COVID-19 infection, medications especially antibiotics like cephalosporins.  No complaints of active bleeding.  Normal white count and hemoglobin. Coags are normal.  Her history of ITP has been noted, however ITP is a diagnosis of exclusion and the risk of severe bleeding is low with a platelet count of more than 20,000    2.  Active COVID-19 infection  3. Disorientation/dementia  4. Hypertension and type 2 diabetes mellitus  5. Cervical degenerative disc disease    Plan:   1. We generally do not use therapies to increase the platelet count with a stable platelet count above 20,000/microL. Check CBC daily and transfuse to keep over 20 K.   2. Recommend to get peripheral smear    Plan of care to be discussed with Dr. Juwan Mccabe MD PGY 6  Hematology/Oncology

## 2020-11-10 NOTE — PLAN OF CARE
CM called and spoke son of with patient [place] [id number] for Discharge Planning Assessment. Per son, she has been living alone in single family home on [facility] with 1 step to point of entry.  Patient was independent with ADLS and DID NOT use in DME (has walker available / does not like to use)  or in-home assistive equipment. She is not on dialysis  or COUMADIN - She takes medications as prescribed / keeps refilled / has resources for all daily and prescriptive needs. Preferred pharmacy is [facility] in [place], [place] - Agreeable to bedside delivery. She is not on home oxygen prior to this hospital admission.  She will have help from her sons and other immediate family upon discharge?All questions addressed. Unit and CM direct numbers provided. Will continue to follow for course of hospitalization    11/10/2020  4:39 AM   Pneumonia due to COVID-19 virus [U07.1, J12.89]  Pneumonia due to COVID-19 virus [U07.1, J12.89]        PCP: Andry Byrnes MD    Pharmacy:  [facility] [address], [place] [address] [address]  Phone: [phone] Fax: [phone]    [id number] - [place] [address] [address]  [address] [place] [address]  Phone: [phone] Fax: [phone]    Emergency Contacts:  Extended Emergency Contact Information  Primary Emergency Contact: Sreekanth Zain  Mobile Phone: [phone]  Relation: Son    Insurance: Payor: MEDICARE / Plan: MEDICARE PART A & B / Product Type: Government /       Melody Daniel RN  Case Management  Ext: [phone]       11/10/20 1218   Discharge Assessment   Assessment Type Discharge Planning Assessment   Confirmed/corrected address and phone number on facesheet? Yes   Assessment information obtained from? Caregiver  (Zain Stack - SON - [phone])   Expected Length of Stay (days) 4   Communicated expected length of stay with patient/caregiver yes   Prior to hospitilization cognitive status:  Alert/Oriented   Prior to hospitalization functional status: Independent;Needs Assistance   Current cognitive status: Unable to Assess  (presently confused)   Current Functional Status: Needs Assistance   Facility Arrived From: ED from 11/9/2020 in [facility] - ED   Lives With alone   Able to Return to Prior Arrangements other (see comments)  (TBD)   Is patient able to care for self after discharge? Unable to determine at this time (comments);No  (Children aware of need for possible 24/7 assistance  (sitters))   Who are your caregiver(s) and their phone number(s)? Zain Stack - SON - [phone] / Isabella Stack (daughter-in-law) [phone]   Patient's perception of discharge disposition home or selfcare  (with family /sitters)   Readmission Within the Last 30 Days no previous admission in last 30 days   Patient currently being followed by outpatient case management? No   Equipment Currently Used at Home walker, rolling;bedside commode   Do you have any problems affording any of your prescribed medications? No   Is the patient taking medications as prescribed? yes   Does the patient have transportation home? Yes   Transportation Anticipated family or friend will provide   Dialysis Name and Scheduled days N/A   Does the patient receive services at the Coumadin Clinic? No   Discharge Plan A Home with family   Discharge Plan B Home   DME Needed Upon Discharge  other (see comments)  (TBD)   Patient/Family in Agreement with Plan yes

## 2020-11-10 NOTE — PLAN OF CARE
Problem: Diabetes Comorbidity  Goal: Blood Glucose Level Within Desired Range  Outcome: Ongoing, Progressing     Problem: Adult Inpatient Plan of Care  Goal: Plan of Care Review  Outcome: Ongoing, Progressing  Goal: Patient-Specific Goal (Individualization)  Outcome: Ongoing, Progressing  Goal: Absence of Hospital-Acquired Illness or Injury  Outcome: Ongoing, Progressing  Goal: Optimal Comfort and Wellbeing  Outcome: Ongoing, Progressing  Goal: Readiness for Transition of Care  Outcome: Ongoing, Progressing  Goal: Rounds/Family Conference  Outcome: Ongoing, Progressing     Problem: Infection  Goal: Infection Symptom Resolution  Outcome: Ongoing, Progressing     Patient A&Ox3 with periods of confusion and delayed responses. Disoriented to time. Follows commands and SCOTT. Denies CP, SOB or dizziness. Dyspnea on exertion noted. Afebrile. VSS on 2L NC. Will continue to monitor.

## 2020-11-10 NOTE — ED NOTES
Sophie from [facility] called with bed assignment of [id number] at [facility]. Number for report [phone].

## 2020-11-10 NOTE — PROGRESS NOTES
Hospital Medicine  Progress Note  [facility]      Patient Name: Brittney Freitas  MRN:  [id number]  Hospital Medicine Team: [facility] R Kylie Preston MD  Date of Admission:  11/10/2020     Length of Stay:  LOS: 0 days     Principal Problem:  COVID-19      [place]: Patient is a 80 y.o. female with PMHx of Diabetes mellitus, type 2, Hypertension, Reflux esophagitis, and Seasonal allergies presented with confusion, fever, and shortness of breath. Patient presented to [place] ED and was found to have fever of 101 and hypoxia satting 88-89% on RA. Chest xray with bilateral infiltrates. CT head with no acute changes. Lactic acid 1.3. COVID positive. Patient treated with IV Rocephin and azithromycin. Also given IV steroids. BP running in low 100's systolic improved after IVF's.  She does not recall how long she was confused for but she tested positive for COVID about 4 days ago. Reportedly multiple family members tested positive for COVID. Patient admitted for evaluation of acute respiratory failure with hypoxia secondary to COVID-19.    Interval History:     Patient noted to be hemodynamically stable. Overnight, patient had T-max of 99.2°, otherwise hemodynamically stable.  Saturating close to 100% on 2 L O2 via nasal cannula. Mental status appears to have improved significantly. During my rounds pt was noted to be awake, alert, oriented x3 but does not know what brought her to the hospital. She reprots she stays in her house alone in [place] and uses a walker to ambulate. She is independent in her ADLs at baseline. Pt passed bedside swallow, ordered mechanical soft diet. Will still obtain formal SLP eval. Weaned down to 1L O2, still saturating >90%, suspect that patient can be weaned off O2 within the next day. OT/PT consulted. Pt denies any complaints and reports she feels close to baseline.      Review of Systems:  ROS (Positive in Bold, otherwise negative)  Constitutional: fever,  chills, night sweats, malaise, weakness  CV: chest pain, edema, palpitations  Resp: SOB, cough, sputum production  GI: changes in appetite, NVDC, pain, melena, hematochezia, GERD, hematemesis  : Dysuria, hematuria, urinary urgency, frequency  MSK: arthralgia/myalgia, joint swelling  Neuro/Psych: anxiety, depression    Inpatient Medications:    Current Facility-Administered Medications:     acetaminophen tablet 650 mg, 650 mg, Oral, Q4H PRN, Ernesto Bermudez MD    albuterol inhaler 2 puff, 2 puff, Inhalation, Q6H, 2 puff at 11/10/20 0726 **AND** MDI Q6H, , , Q6H, Ernesto Bermudez MD    azithromycin tablet 500 mg, 500 mg, Oral, Q24H, Ernesto Bermudez MD    cefTRIAXone injection 1 g, 1 g, Intravenous, Q24H, Ernesto Bermudez MD    dexAMETHasone tablet 6 mg, 6 mg, Oral, Daily, Ernesto Bermudez MD, 6 mg at 11/10/20 0914    dextromethorphan-guaifenesin  mg/5 ml liquid 10 mL, 10 mL, Oral, Q4H PRN, Ernesto Bermudez MD    dextrose 50% injection 12.5 g, 12.5 g, Intravenous, PRN, Ernesto Bermudez MD    dextrose 50% injection 25 g, 25 g, Intravenous, PRN, Ernesto Bermudez MD    glucagon (human recombinant) injection 1 mg, 1 mg, Intramuscular, PRN, Ernesto Bermudez MD    glucose chewable tablet 16 g, 16 g, Oral, PRN, Ernesto Bermudez MD    glucose chewable tablet 24 g, 24 g, Oral, PRN, Ernesto Bermudez MD    insulin aspart U-100 pen 0-5 Units, 0-5 Units, Subcutaneous, QID (AC + HS) PRN, Ernesto Bermudez MD, 2 Units at 11/10/20 0917    melatonin tablet 6 mg, 6 mg, Oral, Nightly PRN, Ernesto Bermudez MD    ondansetron disintegrating tablet 8 mg, 8 mg, Oral, Q8H PRN, Ernesto Bermudez MD    promethazine suppository 25 mg, 25 mg, Rectal, Q6H PRN, Ernesto Bermudez MD    sodium chloride 0.9% flush 10 mL, 10 mL, Intravenous, PRN, Ernesto Bermudez MD    vitamin D 1000 units tablet 1,000 Units, 1,000 Units, Oral, Daily, Ernesto Bermudez MD, 1,000 Units at 11/10/20 0914    Facility-Administered Medications Ordered in Other Encounters:     lactated ringers infusion, , Intravenous,  Continuous, Nicholas Pardo MD, Last Rate: 25 mL/hr at 09/30/19 1439      Physical Exam:    No intake or output data in the 24 hours ending 11/10/20 0941  Wt Readings from Last 3 Encounters:   11/09/20 77.1 kg (170 lb)   09/22/20 89.4 kg (196 lb 15.7 oz)   08/05/20 88.1 kg (194 lb 3.6 oz)       /63   Pulse 76   Temp 99.2 °F (37.3 °C) (Oral)   Resp 18   SpO2 100%     GEN: NAD, conversant  Resp: coarse bilateral breath sounds, no wheezes or rales, normal work of breathing   CV: RRR, no m/r/g, no edema  GI: soft, NTND  Skin: no rash    Laboratory:  Lab Results   Component Value Date    [id number] Positive (A) 11/09/2020       Recent Labs   Lab 11/09/20 2142   WBC 7.31   LYMPH 21.1  1.5   HGB 12.5   HCT 37.5   PLT 41*     Recent Labs   Lab 11/09/20  2142 11/10/20  0734   * 139   K 3.7 4.0   CL 98 102   CO2 24 27   BUN 20 14   CREATININE 0.6 0.6   * 194*   CALCIUM 8.5* 8.3*   MG 1.8 1.8   PHOS  --  3.0     Recent Labs   Lab 11/09/20  2142 11/10/20  0734   ALKPHOS 40* 48*   ALT 17 13   AST 25 22   ALBUMIN 3.7 3.2*   PROT 7.5 7.1   BILITOT 0.6 0.3   INR  --  0.9        Recent Labs     11/09/20  2142 11/10/20  0734   BNP  --  12   TROPONINI 0.06  --        All labs within the last 24 hours were reviewed.     Microbiology:  Microbiology Results (last 7 days)     Procedure Component Value Units Date/Time    Blood culture (site 1) [[id number]] Collected: 11/10/20 0734    Order Status: Sent Specimen: Blood from Antecubital, Left Arm Updated: 11/10/20 0828    Blood culture (site 2) [[id number]] Collected: 11/10/20 0744    Order Status: Sent Specimen: Blood from Antecubital, Right Arm Updated: 11/10/20 0828            Imaging      No results found for this or any previous visit.    X-Ray Chest AP Portable  Narrative: EXAMINATION:  XR CHEST AP PORTABLE    CLINICAL HISTORY:  . Essential (primary) hypertension    TECHNIQUE:  Single frontal portable view of the chest was  performed.    COMPARISON:  None    FINDINGS:  There is mild chronic interstitial change.  Minimal linear discoid atelectasis is present at the lung bases.  No focal consolidation.  No significant pleural effusion.    Heart size is normal.  Mediastinal contours unremarkable.  Trachea midline.    Bony thorax intact.  Impression: Mild chronic interstitial change without focal consolidation.    Electronically signed by: Chris Nguyen  Date:    11/10/2020  Time:    07:32  CT Head Without Contrast  Narrative: EXAMINATION:  CT HEAD WITHOUT CONTRAST    CLINICAL HISTORY:  Altered mental status;    TECHNIQUE:  Low dose axial images were obtained through the head.  Coronal and sagittal reformations were also performed. Contrast was not administered.    COMPARISON:  None.    FINDINGS:  There is no acute hemorrhage or infarction.  There is cortical atrophy.  There are periventricular deep white matter changes consistent with chronic small vessel ischemic disease.  Small remote lacunar infarcts of the bilateral basal ganglia.    No extra-axial fluid collections.  Ventricles are normal in size, shape and configuration.  The basal cisterns are patent.    Prior paranasal sinus surgery.  Mild circumferential mucoperiosteal thickening involving the maxillary sinuses.  Moderate circumferential mucoperiosteal thickening of the frontal and sphenoid sinuses.  Near complete opacification of the ethmoid air cells.    The mastoid air cells and middle ears are normally pneumatized.  Impression: 1. Cortical atrophy with periventricular deep white matter change consistent with chronic small vessel ischemic disease.  2. Small remote lacunar infarcts of the bilateral basal ganglia.  3. Chronic pansinusitis.  The preliminary and final reports are concordant.    Electronically signed by: Chris Nguyen  Date:    11/10/2020  Time:    07:19      All imaging within the last 24 hours was reviewed.     Assessment and Plan:    Active Hospital Problems     Diagnosis  POA    *COVID-19 [U07.1]  Yes    Acute hypoxemic respiratory failure [J96.01]  Yes    Pneumonia due to COVID-19 virus [U07.1, J12.89]  Yes    Thrombocytopenia [D69.6]  Yes    Osteoporosis [M81.0]  Yes    Type 2 diabetes mellitus [E11.9]  Yes    Benign hypertension [I10]  Yes    Impaired cognition [R41.89]  Yes    Adjustment disorder with mixed emotional features [F43.29]  Yes      Resolved Hospital Problems   No resolved problems to display.       COVID-19 Virus Infection:  Viral Pneumonia due to COVID-19:  -Pt tested for COVID-19 and noted to be positive  -CXR shows Mild chronic interstitial change without focal consolidation.  -Isolation: Airborne/Droplet. Surgical mask on patient. Notify Infection Control  -Management: per [facility] COVID Treatment Protocol (4/15/20)  -Monitoring: Telemetry & Continuous Pulse Oximetry  -Antibiotics: started on ceftriaxone 1g Q24h x 5 days and azithromycin 500mg po x1, then 250mg po daily x 4 days  -Nutrition:    -Multivitamin PO daily   -Add Boost supplement   -Vitamin D 1000IU daily if deficient   -Ascorbic acid 500mg PO bid  -Supportive Care:   -Acetaminophen 650mg PO Q6hr PRN fever/headache   -Loperamide PRN viral diarrhea   -Robitussin, tessalon perls for cough   -IVF if indicated, restrictive strategy preferred, no maintenance IV if able   -Investigational Therapies:  -Atorvastatin 40mg po daily   -Due to hypoxia, pt started on dexamethasone 6mg PO daily up to 10 days or until pt is discharged from hospital (whichever is sooner)   -Pt meets criteria for remdesivir / tocilizumab. Pt provided verbal consent to start this medication and it being ordered/dosed per ID.     Acute Hypoxemic Respiratory Failure:  -patient currently requiring 2 L O2 via nasal cannula  -RT consult via Respiratory Communication for COVID Protocols  -If wheezing, albuterol INH Q6h scheduled & PRN  -Incentive Spirometer Q4h  -Flutter Valve Q4h  -Continuous pulse oximetry; titrate  oxygen to keep sats between 92-96%  -Wean off O2 as tolerated    -Supplemental O2 via LFNC, VentiMask, or HFNC (see Respiratory Support Oxygen Therapies)  -Proning Protocol if patient is a candidate with GCS >13 and able to self-prone (see  Proning Protocol)  -If deterioration, may warrant trial of NIPPV and transfer to [facility] room or immediate ICU consult    Acute Encephalopathy: imrpoved   -Etiology:  Infectious vs. Neurologic vs. Iatrogenic  -CT head nonacute, shows cortical atrophy with periventricular deep white matter change consistent with chronic small vessel ischemic disease. Small remote lacunar infarcts of the bilateral basal ganglia  -Medication additions or changes could be affecting presentation. hold home BuSpar, Cymbalta, Lexapro, gabapentin, Norco, tramadol, Seroquel  -Continue correction of metabolic derangements  -aspiration precautions, fall precautions  -continue neuro checks  -schedule melatonin qHS to regualr sleep wake cycle  -Maintain Delirium precautions: Maintain regular sleep cycle. Early ambulation. Minimal interruptions overnight. Re-orient patient frequently. Maintain adequate bowel regimen, hydration and electrolyte replenishments. Hearing Aids and eye glasses as needed.    Thrombocytopenia:  -has a history of ITP  -heme/onc consulted, appreciate recs  -no active signs of bleeding  -holding lovenox  -order peripheral smear  -transfuse plt if plt count drops < [id number]    Hypertension:  -stable  -Continue PTA  lisinopril 20  -monitor vitals q4h  -SBP goal of <160 in hospital    Mood disorder:  -hold home medications as currently altered.   -hold home BuSpar, Cymbalta, Lexapro, Seroquel    Chronic pain:  -hold chronic pain medications 2/2 confusion, add back as needed  -hold home gabapentin, Norco, tramadol    Goals of care, counseling/discussion  -Reviewed the typical clinical course of COVID19 with pt, including the potential for acute decompensation requiring intubation and  mechanical ventilation  -Discussed again as part of routine daily evaluation, patient/POA maintains code status of FULL    VTE High Risk Prophylaxis: SCDs    Dispo: DC home once medically ready.     Subsequent Hospital Care:   Level 3 93794 Total visit time was 35 minutes or greater with greater than 50% of time spent in counseling and coordination of care.     Kylie Preston MD  11/10/2020   Department of Hospital medicine

## 2020-11-11 LAB
ALBUMIN SERPL BCP-MCNC: 2.9 G/DL (ref 3.5–5.2)
ALP SERPL-CCNC: 41 U/L (ref 55–135)
ALT SERPL W/O P-5'-P-CCNC: 12 U/L (ref 10–44)
ANION GAP SERPL CALC-SCNC: 13 MMOL/L (ref 8–16)
AST SERPL-CCNC: 24 U/L (ref 10–40)
BASOPHILS # BLD AUTO: 0.02 K/UL (ref 0–0.2)
BASOPHILS NFR BLD: 0.2 % (ref 0–1.9)
BILIRUB SERPL-MCNC: 0.3 MG/DL (ref 0.1–1)
BUN SERPL-MCNC: 17 MG/DL (ref 8–23)
CALCIUM SERPL-MCNC: 8.1 MG/DL (ref 8.7–10.5)
CHLORIDE SERPL-SCNC: 105 MMOL/L (ref 95–110)
CO2 SERPL-SCNC: 22 MMOL/L (ref 23–29)
CREAT SERPL-MCNC: 0.6 MG/DL (ref 0.5–1.4)
DIFFERENTIAL METHOD: ABNORMAL
EOSINOPHIL # BLD AUTO: 0 K/UL (ref 0–0.5)
EOSINOPHIL NFR BLD: 0 % (ref 0–8)
ERYTHROCYTE [DISTWIDTH] IN BLOOD BY AUTOMATED COUNT: 13.2 % (ref 11.5–14.5)
EST. GFR  (AFRICAN AMERICAN): >60 ML/MIN/1.73 M^2
EST. GFR  (NON AFRICAN AMERICAN): >60 ML/MIN/1.73 M^2
GLUCOSE SERPL-MCNC: 116 MG/DL (ref 70–110)
HCT VFR BLD AUTO: 35.4 % (ref 37–48.5)
HGB BLD-MCNC: 11.3 G/DL (ref 12–16)
IMM GRANULOCYTES # BLD AUTO: 0.05 K/UL (ref 0–0.04)
IMM GRANULOCYTES NFR BLD AUTO: 0.6 % (ref 0–0.5)
LYMPHOCYTES # BLD AUTO: 1.3 K/UL (ref 1–4.8)
LYMPHOCYTES NFR BLD: 15.5 % (ref 18–48)
MAGNESIUM SERPL-MCNC: 1.8 MG/DL (ref 1.6–2.6)
MCH RBC QN AUTO: 30.1 PG (ref 27–31)
MCHC RBC AUTO-ENTMCNC: 31.9 G/DL (ref 32–36)
MCV RBC AUTO: 94 FL (ref 82–98)
MONOCYTES # BLD AUTO: 0.7 K/UL (ref 0.3–1)
MONOCYTES NFR BLD: 8.5 % (ref 4–15)
NEUTROPHILS # BLD AUTO: 6.1 K/UL (ref 1.8–7.7)
NEUTROPHILS NFR BLD: 75.2 % (ref 38–73)
NRBC BLD-RTO: 0 /100 WBC
PATH REV BLD -IMP: NORMAL
PHOSPHATE SERPL-MCNC: 2.3 MG/DL (ref 2.7–4.5)
PLATELET # BLD AUTO: 171 K/UL (ref 150–350)
PMV BLD AUTO: 11.5 FL (ref 9.2–12.9)
POCT GLUCOSE: 172 MG/DL (ref 70–110)
POCT GLUCOSE: 183 MG/DL (ref 70–110)
POCT GLUCOSE: 201 MG/DL (ref 70–110)
POTASSIUM SERPL-SCNC: 3.7 MMOL/L (ref 3.5–5.1)
PROT SERPL-MCNC: 6.4 G/DL (ref 6–8.4)
RBC # BLD AUTO: 3.75 M/UL (ref 4–5.4)
SODIUM SERPL-SCNC: 140 MMOL/L (ref 136–145)
WBC # BLD AUTO: 8.11 K/UL (ref 3.9–12.7)

## 2020-11-11 PROCEDURE — 99233 SBSQ HOSP IP/OBS HIGH 50: CPT | Mod: ,,, | Performed by: HOSPITALIST

## 2020-11-11 PROCEDURE — 97535 SELF CARE MNGMENT TRAINING: CPT

## 2020-11-11 PROCEDURE — 25000003 PHARM REV CODE 250: Performed by: HOSPITALIST

## 2020-11-11 PROCEDURE — 97110 THERAPEUTIC EXERCISES: CPT

## 2020-11-11 PROCEDURE — 36415 COLL VENOUS BLD VENIPUNCTURE: CPT

## 2020-11-11 PROCEDURE — 85025 COMPLETE CBC W/AUTO DIFF WBC: CPT

## 2020-11-11 PROCEDURE — 25000003 PHARM REV CODE 250: Performed by: INTERNAL MEDICINE

## 2020-11-11 PROCEDURE — 63600175 PHARM REV CODE 636 W HCPCS: Performed by: INTERNAL MEDICINE

## 2020-11-11 PROCEDURE — 99233 PR SUBSEQUENT HOSPITAL CARE,LEVL III: ICD-10-PCS | Mod: ,,, | Performed by: HOSPITALIST

## 2020-11-11 PROCEDURE — 94799 UNLISTED PULMONARY SVC/PX: CPT

## 2020-11-11 PROCEDURE — 97165 OT EVAL LOW COMPLEX 30 MIN: CPT

## 2020-11-11 PROCEDURE — 99900035 HC TECH TIME PER 15 MIN (STAT)

## 2020-11-11 PROCEDURE — 92610 EVALUATE SWALLOWING FUNCTION: CPT

## 2020-11-11 PROCEDURE — 80053 COMPREHEN METABOLIC PANEL: CPT

## 2020-11-11 PROCEDURE — 63700000 PHARM REV CODE 250 ALT 637 W/O HCPCS: Performed by: INTERNAL MEDICINE

## 2020-11-11 PROCEDURE — 97161 PT EVAL LOW COMPLEX 20 MIN: CPT

## 2020-11-11 PROCEDURE — 27000221 HC OXYGEN, UP TO 24 HOURS

## 2020-11-11 PROCEDURE — 83735 ASSAY OF MAGNESIUM: CPT

## 2020-11-11 PROCEDURE — 94761 N-INVAS EAR/PLS OXIMETRY MLT: CPT

## 2020-11-11 PROCEDURE — 84100 ASSAY OF PHOSPHORUS: CPT

## 2020-11-11 PROCEDURE — 94640 AIRWAY INHALATION TREATMENT: CPT

## 2020-11-11 PROCEDURE — 20600001 HC STEP DOWN PRIVATE ROOM

## 2020-11-11 RX ORDER — SODIUM,POTASSIUM PHOSPHATES 280-250MG
2 POWDER IN PACKET (EA) ORAL ONCE
Status: COMPLETED | OUTPATIENT
Start: 2020-11-11 | End: 2020-11-11

## 2020-11-11 RX ADMIN — AZITHROMYCIN MONOHYDRATE 500 MG: 250 TABLET ORAL at 03:11

## 2020-11-11 RX ADMIN — CHOLECALCIFEROL TAB 25 MCG (1000 UNIT) 1000 UNITS: 25 TAB at 09:11

## 2020-11-11 RX ADMIN — ALBUTEROL SULFATE 2 PUFF: 90 AEROSOL, METERED RESPIRATORY (INHALATION) at 02:11

## 2020-11-11 RX ADMIN — REMDESIVIR 100 MG: 100 INJECTION, POWDER, LYOPHILIZED, FOR SOLUTION INTRAVENOUS at 05:11

## 2020-11-11 RX ADMIN — ALBUTEROL SULFATE 2 PUFF: 90 AEROSOL, METERED RESPIRATORY (INHALATION) at 01:11

## 2020-11-11 RX ADMIN — ALBUTEROL SULFATE 2 PUFF: 90 AEROSOL, METERED RESPIRATORY (INHALATION) at 08:11

## 2020-11-11 RX ADMIN — OXYCODONE HYDROCHLORIDE AND ACETAMINOPHEN 500 MG: 500 TABLET ORAL at 09:11

## 2020-11-11 RX ADMIN — OXYCODONE HYDROCHLORIDE AND ACETAMINOPHEN 500 MG: 500 TABLET ORAL at 08:11

## 2020-11-11 RX ADMIN — POTASSIUM & SODIUM PHOSPHATES POWDER PACK 280-160-250 MG 2 PACKET: 280-160-250 PACK at 11:11

## 2020-11-11 RX ADMIN — DEXAMETHASONE 6 MG: 4 TABLET ORAL at 09:11

## 2020-11-11 RX ADMIN — PANTOPRAZOLE SODIUM 40 MG: 40 TABLET, DELAYED RELEASE ORAL at 09:11

## 2020-11-11 RX ADMIN — LISINOPRIL 20 MG: 20 TABLET ORAL at 09:11

## 2020-11-11 RX ADMIN — FOLIC ACID 1 MG: 1 TABLET ORAL at 09:11

## 2020-11-11 RX ADMIN — THERA TABS 1 TABLET: TAB at 11:11

## 2020-11-11 RX ADMIN — CEFTRIAXONE SODIUM 1 G: 1 INJECTION, POWDER, FOR SOLUTION INTRAMUSCULAR; INTRAVENOUS at 03:11

## 2020-11-11 NOTE — PLAN OF CARE
Problem: Diabetes Comorbidity  Goal: Blood Glucose Level Within Desired Range  Outcome: Ongoing, Progressing     Problem: Adult Inpatient Plan of Care  Goal: Plan of Care Review  Outcome: Ongoing, Progressing  Goal: Patient-Specific Goal (Individualization)  Outcome: Ongoing, Progressing  Goal: Absence of Hospital-Acquired Illness or Injury  Outcome: Ongoing, Progressing  Goal: Optimal Comfort and Wellbeing  Outcome: Ongoing, Progressing  Goal: Readiness for Transition of Care  Outcome: Ongoing, Progressing  Goal: Rounds/Family Conference  Outcome: Ongoing, Progressing     Problem: Infection  Goal: Infection Symptom Resolution  Outcome: Ongoing, Progressing     Problem: Fall Injury Risk  Goal: Absence of Fall and Fall-Related Injury  Outcome: Ongoing, Progressing     Problem: Skin Injury Risk Increased  Goal: Skin Health and Integrity  Outcome: Ongoing, Progressing     Patient A&Ox3-4 with periods of confusion and delayed responses. Intermittently disoriented to time. Follows commands and SCOTT. Denies CP, SOB or dizziness. Dyspnea on exertion noted. Afebrile. VSS on 2L NC. Telesitter at bedside. Free from falls. Call bell within reach. Rounding in place for comfort and safety. Will continue to monitor.

## 2020-11-11 NOTE — PT/OT/SLP EVAL
Physical Therapy Co-Evaluation and Treatment     Patient Name:  Brittney Freitas   MRN:  [id number]    *co-treatment with OT  2/2 pt with potential impaired ability to tolerate 2 evaluations 2/2 medical status   Recommendations:     Discharge Recommendations:  home health PT   Discharge Equipment Recommendations: shower chair   Barriers to discharge: Decreased caregiver support    Assessment:     Brittney Freitas is a 80 y.o. female admitted with a medical diagnosis of COVID-19.  She presents with the following impairments/functional limitations:  weakness, impaired endurance, impaired self care skills, impaired functional mobilty, gait instability, impaired balance, impaired cardiopulmonary response to activity. Pt tolerated activity with stand by assistance to ambulate with stand by assistance. Pt uses a RW at baseline, reports she is near baseline at this time. Pt with VSS on 2L O2 throughout session. Pt would continue to benefit from acute skilled therapy intervention to address deficits and progress toward prior level of function.       Rehab Prognosis: Good; patient would benefit from acute skilled PT services to address these deficits and reach maximum level of function.    Recent Surgery: * No surgery found *      Plan:     During this hospitalization, patient to be seen 3 x/week to address the identified rehab impairments via gait training, therapeutic activities, therapeutic exercises, neuromuscular re-education and progress toward the following goals:    · Plan of Care Expires:  12/11/20    Subjective     Chief Complaint: Pt c/o fatigue   Patient/Family Comments/goals: to get better and return home   Pain/Comfort:  · Pain Rating 1: 0/10  · Pain Rating Post-Intervention 1: 0/10    Patients cultural, spiritual, Evangelical conflicts given the current situation: no    Living Environment:  Pt lives alone in a [facility] with no KENISHA   Prior to admission, patients level of function was independent with mobility  and ADLs with use of RW and BSC. Pt reports no recent falls.  Equipment used at home: rollator, bedside commode.  DME owned (not currently used): none.  Upon discharge, patient will have assistance from family, per CM note.    Objective:     Communicated with RN prior to session.  Patient found HOB elevated with pulse ox (continuous), telemetry, oxygen  upon PT entry to room.    General Precautions: Standard, airborne, contact, droplet, fall   Orthopedic Precautions:N/A   Braces: N/A     Exams:  · Cognitive Exam:  Patient is AAOx4, followed all commands, communicates clearly and fluently  · Gross Motor Coordination:  WFL  · RLE ROM: WFL  · RLE Strength: WFL  · LLE ROM: WFL  · LLE Strength: WFL    Functional Mobility:  · Bed Mobility:     · Supine to Sit: stand by assistance  · Transfers:     · Sit to Stand:  1x from EOB, 1x from toilet with contact guard assistance with rolling walker  Gait: Pt ambulated 10 ft, then 30 ft with RW and contact guard assistance progressing to stand by assistance. Pt demo'd small step size, decreased foot clearance, narrow AHSAN, flexed posture. Cuing provided for forward gaze and upright posture. Pt with no LOB, no SOB, no dizziness.     Therapeutic Activities and Exercises:  Pt ambulated to restroom, performed toileting and hygiene with assistance for cleaning after BM.   Pt SpO2 >90% on 2L O2 throughout session.    Pt educated on role of PT/POC. Pt verbalized understanding.   Pt encouraged to only perform OOB mobility with assistance from nursing/therapy. Pt agreeable.   Pt encouraged to ambulate daily with assistance/supervision from nursing/therapy. Pt agreeable.  RW ordered to room for use with staff assistance.     AM-PAC 6 CLICK MOBILITY  Total Score:18     Patient left up in chair with all lines intact, call button in reach and RN notified.    GOALS:   Multidisciplinary Problems     Physical Therapy Goals        Problem: Physical Therapy Goal    Goal Priority Disciplines Outcome  Goal Variances Interventions   Physical Therapy Goal     PT, PT/OT Ongoing, Progressing     Description: Goals to be met by: 2020     Patient will increase functional independence with mobility by performin. Supine to sit with Set-up [place]  2. Sit to stand transfer with Modified [place]  3. Gait  x 150 feet with Supervision using Rolling Walker.   4. Stand for 8 minutes with Modified [place] using Rolling Walker                     History:     Past Medical History:   Diagnosis Date    Diabetes mellitus, type 2     Hypertension     Reflux esophagitis     Seasonal allergies        Past Surgical History:   Procedure Laterality Date    BACK SURGERY      EPIDURAL STEROID INJECTION N/A 2020    Procedure: ARACELY C7-T1;  Surgeon: Nicholas Pardo MD;  Location: [facility] OR;  Service: Pain Management;  Laterality: N/A;    EPIDURAL STEROID INJECTION INTO CERVICAL SPINE N/A 2019    Procedure: Injection-steroid-epidural-cervical;  Surgeon: Nicholas Pardo MD;  Location: [facility] OR;  Service: Pain Management;  Laterality: N/A;  C7-T1    KNEE SURGERY      ovarian tumor      SHOULDER ARTHROSCOPY         Time Tracking:     PT Received On: 20  PT Start Time: 1105     PT Stop Time: 1129  PT Total Time (min): 24 min     Billable Minutes: Evaluation 10 mins  and Therapeutic Exercise 14 mins       Angeles Sahni, PT  2020

## 2020-11-11 NOTE — PLAN OF CARE
Problem: Occupational Therapy Goal  Goal: Occupational Therapy Goal  Description: Goals to be met by: 11/25/2020     Patient will increase functional independence with ADLs by performing:    UE Dressing with Supervision.  LE Dressing with Supervision.  Grooming while standing at sink with Supervision.  Toileting from toilet with Supervision for hygiene and clothing management.   Toilet transfer to toilet with Supervision.    Outcome: Ongoing, Progressing     Angella Alonzo, OTR/L  Pager: [phone]  11/11/2020

## 2020-11-11 NOTE — PROGRESS NOTES
Hospital Medicine  Progress Note  [facility]      Patient Name: Brittney Freitas  MRN:  [id number]  Hospital Medicine Team: [facility] Kylie Preston MD  Date of Admission:  11/10/2020     Length of Stay:  LOS: 1 day     Principal Problem:  COVID-19      Brittneyry: Patient is a 80 y.o. female with PMHx of Diabetes mellitus, type 2, Hypertension, Reflux esophagitis, and Seasonal allergies presented with confusion, fever, and shortness of breath. Patient presented to [facility] ED and was found to have fever of 101 and hypoxia satting 88-89% on RA. Chest xray with bilateral infiltrates. CT head with no acute changes. Lactic acid 1.3. COVID positive. Patient treated with IV Rocephin and azithromycin. Also given IV steroids. BP running in low 100's systolic improved after IVF's.  She does not recall how long she was confused for but she tested positive for COVID about 4 days ago. Reportedly multiple family members tested positive for COVID. She reprots she stays in her house alone in [place] and uses a walker to ambulate. She is independent in her ADLs at baseline. Patient admitted for evaluation of acute respiratory failure with hypoxia secondary to COVID-19.    Interval History:     Patient noted to be hemodynamically stable. Overnight, patient afebrile and hemodynamically stable.  Continues to be on 2 L O2 via nasal cannula.  Noted to be drowsy but easily arousible. Mental status appears to be significantly improved. Pt continues to be awake, alert, oriented x3.  Able to answer questions appropriately, follow commands.  OT/PT/SLP consulted. Pt denies any complaints and reports she feels close to baseline, just slightly tired.      Review of Systems:  ROS (Positive in Bold, otherwise negative)  Constitutional: fever, chills, night sweats, malaise, weakness  CV: chest pain, edema, palpitations  Resp: SOB, cough, sputum production  GI: changes in appetite, NVDC, pain, melena,  hematochezia, GERD, hematemesis  : Dysuria, hematuria, urinary urgency, frequency  MSK: arthralgia/myalgia, joint swelling  Neuro/Psych: anxiety, depression    Inpatient Medications:    Current Facility-Administered Medications:     acetaminophen tablet 650 mg, 650 mg, Oral, Q4H PRN, Ernesto Bermudez MD    albuterol inhaler 2 puff, 2 puff, Inhalation, Q6H, 2 puff at 11/11/20 0819 **AND** MDI Q6H, , , Q6H, Ernesto Bermudez MD    ascorbic acid (vitamin C) tablet 500 mg, 500 mg, Oral, BID, Kylie Preston MD, 500 mg at 11/11/20 0934    azithromycin tablet 500 mg, 500 mg, Oral, Q24H, Ernesto Bermudez MD, 500 mg at 11/10/20 1729    benzonatate capsule 100 mg, 100 mg, Oral, TID PRN, Kylie Preston MD, 100 mg at 11/10/20 2036    cefTRIAXone injection 1 g, 1 g, Intravenous, Q24H, Ernesto Bermudez MD, 1 g at 11/10/20 1722    dexAMETHasone tablet 6 mg, 6 mg, Oral, Daily, Ernesto Bermudez MD, 6 mg at 11/11/20 0933    dextromethorphan-guaifenesin  mg/5 ml liquid 10 mL, 10 mL, Oral, Q4H PRN, Ernesto Bermudez MD    dextrose 50% injection 12.5 g, 12.5 g, Intravenous, PRN, Ernesto Bermudez MD    dextrose 50% injection 25 g, 25 g, Intravenous, PRN, Ernesto Bermudez MD    folic acid tablet 1 mg, 1 mg, Oral, Daily, Kylie Preston MD, 1 mg at 11/11/20 0934    glucagon (human recombinant) injection 1 mg, 1 mg, Intramuscular, PRN, Ernesto Bermudez MD    glucose chewable tablet 16 g, 16 g, Oral, PRN, Ernesto Bermudez MD    glucose chewable tablet 24 g, 24 g, Oral, PRN, Ernesto Bermudez MD    insulin aspart U-100 pen 0-5 Units, 0-5 Units, Subcutaneous, QID (AC + HS) PRN, Ernesto Bermudez MD, 2 Units at 11/10/20 0917    lisinopriL tablet 20 mg, 20 mg, Oral, Daily, Kylie Preston MD, 20 mg at 11/11/20 0934    multivitamin tablet, 1 tablet, Oral, Daily, Kylie Preston MD    ondansetron disintegrating tablet 8 mg, 8 mg, Oral, Q8H PRN, Ernesto Bermudez MD    pantoprazole EC tablet 40 mg, 40 mg, Oral, Daily, Kylie Preston MD, 40 mg at 11/11/20 0933     potassium, sodium phosphates 280-160-250 mg packet 2 packet, 2 packet, Oral, Once, Kylie Preston MD    promethazine suppository 25 mg, 25 mg, Rectal, Q6H PRN, Ernesto Bermudez MD    [COMPLETED] remdesivir 200 mg in sodium chloride 0.9% 250 mL infusion, 200 mg, Intravenous, Once, 200 mg at 11/10/20 1722 **FOLLOWED BY** remdesivir 100 mg in sodium chloride 0.9% 250 mL infusion, 100 mg, Intravenous, Q24H, Kylie Preston MD    sodium chloride 0.9% flush 10 mL, 10 mL, Intravenous, PRN, Ernesto Bermudez MD    vitamin D 1000 units tablet 1,000 Units, 1,000 Units, Oral, Daily, Ernesto Bermudez MD, 1,000 Units at 11/11/20 0933    Facility-Administered Medications Ordered in Other Encounters:     lactated ringers infusion, , Intravenous, Continuous, Nicholas Pardo MD, Last Rate: 25 mL/hr at 09/30/19 1439      Physical Exam:      Intake/Output Summary (Last 24 hours) at 11/11/2020 1036  Last data filed at 11/11/2020 0000  Gross per 24 hour   Intake 490 ml   Output --   Net 490 ml     Wt Readings from Last 3 Encounters:   11/09/20 77.1 kg (170 lb)   09/22/20 89.4 kg (196 lb 15.7 oz)   08/05/20 88.1 kg (194 lb 3.6 oz)       BP (!) 148/65 (BP Location: Left arm, Patient Position: Lying)   Pulse 86   Temp 98.4 °F (36.9 °C) (Oral)   Resp 20   SpO2 95%     GEN: NAD, conversant  Resp: coarse bilateral breath sounds, no wheezes or rales, normal work of breathing   CV: RRR, no m/r/g, no edema  GI: soft, NTND  Skin: no rash    Laboratory:  Lab Results   Component Value Date    [id number] Positive (A) 11/09/2020       Recent Labs   Lab 11/10/20  0734 11/10/20  1044 11/11/20  0244   WBC 4.98 4.94 8.11   LYMPH 15.5*  0.8* 13.8*  0.7* 15.5*  1.3   HGB 12.5 11.8* 11.3*   HCT 38.5 35.5* 35.4*    153 171     Recent Labs   Lab 11/09/20  2142 11/10/20  0734 11/11/20  0244   * 139 140   K 3.7 4.0 3.7   CL 98 102 105   CO2 24 27 22*   BUN 20 14 17   CREATININE 0.6 0.6 0.6   * 194* 116*   CALCIUM 8.5* 8.3* 8.1*   MG 1.8  1.8 1.8   PHOS  --  3.0 2.3*     Recent Labs   Lab 11/09/20  2142 11/10/20  0734 11/11/20  0244   ALKPHOS 40* 48* 41*   ALT 17 13 12   AST 25 22 24   ALBUMIN 3.7 3.2* 2.9*   PROT 7.5 7.1 6.4   BILITOT 0.6 0.3 0.3   INR  --  0.9  --         Recent Labs     11/09/20  2142 11/10/20  0734   FERRITIN  --  295   BNP  --  12   TROPONINI 0.06  --        All labs within the last 24 hours were reviewed.     Microbiology:  Microbiology Results (last 7 days)     Procedure Component Value Units Date/Time    Blood culture (site 1) [[id number]] Collected: 11/10/20 0734    Order Status: Completed Specimen: Blood from Antecubital, Left Arm Updated: 11/11/20 1012     Blood Culture, Routine No Growth to date      No Growth to date    Narrative:      Site # 1, aerobic and anaerobic    Blood culture (site 2) [[id number]] Collected: 11/10/20 0744    Order Status: Completed Specimen: Blood from Antecubital, Right Arm Updated: 11/11/20 1012     Blood Culture, Routine No Growth to date      No Growth to date    Narrative:      Site # 2, aerobic only            Imaging      No results found for this or any previous visit.    X-Ray Chest AP Portable  Narrative: EXAMINATION:  XR CHEST AP PORTABLE    CLINICAL HISTORY:  . Essential (primary) hypertension    TECHNIQUE:  Single frontal portable view of the chest was performed.    COMPARISON:  None    FINDINGS:  There is mild chronic interstitial change.  Minimal linear discoid atelectasis is present at the lung bases.  No focal consolidation.  No significant pleural effusion.    Heart size is normal.  Mediastinal contours unremarkable.  Trachea midline.    Bony thorax intact.  Impression: Mild chronic interstitial change without focal consolidation.    Electronically signed by: Chris Nguyen  Date:    11/10/2020  Time:    07:32  CT Head Without Contrast  Narrative: EXAMINATION:  CT HEAD WITHOUT CONTRAST    CLINICAL HISTORY:  Altered mental status;    TECHNIQUE:  Low dose axial images were obtained  through the head.  Coronal and sagittal reformations were also performed. Contrast was not administered.    COMPARISON:  None.    FINDINGS:  There is no acute hemorrhage or infarction.  There is cortical atrophy.  There are periventricular deep white matter changes consistent with chronic small vessel ischemic disease.  Small remote lacunar infarcts of the bilateral basal ganglia.    No extra-axial fluid collections.  Ventricles are normal in size, shape and configuration.  The basal cisterns are patent.    Prior paranasal sinus surgery.  Mild circumferential mucoperiosteal thickening involving the maxillary sinuses.  Moderate circumferential mucoperiosteal thickening of the frontal and sphenoid sinuses.  Near complete opacification of the ethmoid air cells.    The mastoid air cells and middle ears are normally pneumatized.  Impression: 1. Cortical atrophy with periventricular deep white matter change consistent with chronic small vessel ischemic disease.  2. Small remote lacunar infarcts of the bilateral basal ganglia.  3. Chronic pansinusitis.  The preliminary and final reports are concordant.    Electronically signed by: Chris Nguyen  Date:    11/10/2020  Time:    07:19      All imaging within the last 24 hours was reviewed.     Assessment and Plan:    Active Hospital Problems    Diagnosis  POA    *COVID-19 [U07.1]  Yes    Acute hypoxemic respiratory failure [J96.01]  Yes    Pneumonia due to COVID-19 virus [U07.1, J12.89]  Yes    Thrombocytopenia [D69.6]  Yes    Osteoporosis [M81.0]  Yes    Type 2 diabetes mellitus [E11.9]  Yes    Benign hypertension [I10]  Yes    Impaired cognition [R41.89]  Yes    Adjustment disorder with mixed emotional features [F43.29]  Yes      Resolved Hospital Problems   No resolved problems to display.       COVID-19 Virus Infection:  Viral Pneumonia due to COVID-19:  -Pt tested for COVID-19 and noted to be positive  -CXR shows Mild chronic interstitial change without focal  consolidation.  -Isolation: Airborne/Droplet. Surgical mask on patient. Notify Infection Control  -Management: per [facility] COVID Treatment Protocol (4/15/20)  -Monitoring: Telemetry & Continuous Pulse Oximetry  -Antibiotics: started on ceftriaxone 1g Q24h x 5 days and azithromycin 500mg po x1, then 250mg po daily x 4 days  -Nutrition:    -Multivitamin PO daily   -Add Boost supplement   -Vitamin D 1000IU daily if deficient   -Ascorbic acid 500mg PO bid  -Supportive Care:   -Acetaminophen 650mg PO Q6hr PRN fever/headache   -Loperamide PRN viral diarrhea   -Robitussin, tessalon perls for cough   -IVF if indicated, restrictive strategy preferred, no maintenance IV if able   -Investigational Therapies:  -Atorvastatin 40mg po daily   -Due to hypoxia, pt started on dexamethasone 6mg PO daily up to 10 days or until pt is discharged from hospital (whichever is sooner)   -Pt meets criteria for remdesivir. Pt provided verbal consent to start this medication and it being ordered/dosed per ID.     Acute Hypoxemic Respiratory Failure:  -patient currently requiring 2 L O2 via nasal cannula  -RT consult via Respiratory Communication for COVID Protocols  -If wheezing, albuterol INH Q6h scheduled & PRN  -Incentive Spirometer Q4h  -Flutter Valve Q4h  -Continuous pulse oximetry; titrate oxygen to keep sats between 92-96%  -Wean off O2 as tolerated    -Supplemental O2 via LFNC, VentiMask, or HFNC (see Respiratory Support Oxygen Therapies)  -Proning Protocol if patient is a candidate with GCS >13 and able to self-prone (see  Proning Protocol)  -If deterioration, may warrant trial of NIPPV and transfer to [facility] pressure room or immediate ICU consult    Acute Encephalopathy: imrpoved   -Etiology:  Infectious vs. Neurologic vs. Iatrogenic  -CT head nonacute, shows cortical atrophy with periventricular deep white matter change consistent with chronic small vessel ischemic disease. Small remote lacunar infarcts of the bilateral basal  ganglia  -Medication additions or changes could be affecting presentation. hold home BuSpar, Cymbalta, Lexapro, gabapentin, Norco, tramadol, Seroquel  -Continue correction of metabolic derangements  -aspiration precautions, fall precautions  -continue neuro checks  -schedule melatonin qHS to regualr sleep wake cycle  -Maintain Delirium precautions: Maintain regular sleep cycle. Early ambulation. Minimal interruptions overnight. Re-orient patient frequently. Maintain adequate bowel regimen, hydration and electrolyte replenishments. Hearing Aids and eye glasses as needed.    Thrombocytopenia:  -has a history of ITP  -heme/onc consulted, appreciate recs  -no active signs of bleeding  -holding lovenox  -order peripheral smear  -transfuse plt if plt count drops < 11133    Hypertension:  -stable  -Continue PTA  lisinopril 20  -monitor vitals q4h  -SBP goal of <160 in hospital    Mood disorder:  -hold home medications as currently altered.   -hold home BuSpar, Cymbalta, Lexapro, Seroquel    Chronic pain:  -hold chronic pain medications 2/2 confusion, add back as needed  -hold home gabapentin, Norco, tramadol    Hypophosphatemia:  -phos of 2.3  -replete and monitor     Goals of care, counseling/discussion  -Reviewed the typical clinical course of COVID19 with pt, including the potential for acute decompensation requiring intubation and mechanical ventilation  -Discussed again as part of routine daily evaluation, patient/POA maintains code status of FULL    VTE High Risk Prophylaxis: SCDs    Dispo: DC home once medically ready.     Subsequent Hospital Care:   Level 3 [id number] Total visit time was 35 minutes or greater with greater than 50% of time spent in counseling and coordination of care.     Kylie Preston MD  11/11/2020   Department of Hospital medicine

## 2020-11-11 NOTE — PLAN OF CARE
Problem: Physical Therapy Goal  Goal: Physical Therapy Goal  Description: Goals to be met by: 2020     Patient will increase functional independence with mobility by performin. Supine to sit with Set-up [place]  2. Sit to stand transfer with Modified [place]  3. Gait  x 150 feet with Supervision using Rolling Walker.   4. Stand for 8 minutes with Modified [place] using Rolling Walker    Outcome: Ongoing, Progressing     Pt evaluated and appropriate goals established.     Angeles Sahni, PT, DPT  2020  [phone]

## 2020-11-11 NOTE — NURSING
Report received from CHELSEA Whalen. Pt resting in bed, fidgety, no complaints at this time, VSS at this time on 1 Lpm NC. TM

## 2020-11-11 NOTE — PT/OT/SLP EVAL
Speech Language Pathology Evaluation  Bedside Swallow    Patient Name:  Brittney Freitas   MRN:  [id number]  Admitting Diagnosis: COVID-19    Recommendations:                 General Recommendations:  Dysphagia therapy and pending progress possible Cognitive-linguistic evaluation  Diet recommendations:  Mechanical soft, Thin, medications one at a time ok  Per IDDSI guidelines, all mechanical soft items must be minced and moist. Please avoid mixed consistencies (such as cereals, soups with large pieces of meat/vegetable, etc.), avoid dry/coarse/crumbly items such as rice ,nuts, seeds, dried fruit, coconut, avoid fibrous foods, avoid tough skins, avoid tough vegetables (i.e. no corn, brussel sprouts,etc) avoid chewy/sticky foods (i.e. no peanut putter, caramel, licorice, etc.)    Aspiration Precautions: Supervision with all PO advised for safety , 1 bite/sip at a time, Avoid talking while eating, Eliminate distractions, Feed only when awake/alert, Frequent oral care, HOB to 90 degrees, Meds whole buried in puree, Meds whole 1 at a time, Remain upright 30 minutes post meal, Small bites/sips and Strict aspiration precautions   General Precautions: Standard, airborne, aspiration, contact, fall, droplet  Communication strategies:  go to room if call light pushed    History:     Past Medical History:   Diagnosis Date    Diabetes mellitus, type 2     Hypertension     Reflux esophagitis     Seasonal allergies        Past Surgical History:   Procedure Laterality Date    BACK SURGERY      EPIDURAL STEROID INJECTION N/A 6/18/2020    Procedure: ARACELY C7-T1;  Surgeon: Nicholas Pardo MD;  Location: [facility] OR;  Service: Pain Management;  Laterality: N/A;    EPIDURAL STEROID INJECTION INTO CERVICAL SPINE N/A 9/30/2019    Procedure: Injection-steroid-epidural-cervical;  Surgeon: Nicholas Pardo MD;  Location: [facility] OR;  Service: Pain Management;  Laterality: N/A;  C7-T1    KNEE SURGERY      ovarian tumor      SHOULDER  "ARTHROSCOPY         Social History: Patient lives alone in home in [place].    Prior Intubation HX:  None this admission     Modified Barium Swallow: none prior at this facility     Chest X-Rays: 11/10/20: Mild chronic interstitial change without focal consolidation.    CT head: 11/10/20: 1. Cortical atrophy with periventricular deep white matter change consistent with chronic small vessel ischemic disease.  2. Small remote lacunar infarcts of the bilateral basal ganglia.  3. Chronic pansinusitis.  The preliminary and final reports are concordant.    Prior diet: regular, thin     Occupation/hobbies/homemaking: Retired, Independent with ADLS prior to admit.     Subjective     SLP reviewed Pt with RN prior to session, RN reported Pt tolerating meals and medications without difficulty  Pt presents calm  She explains, "My granddaughter got it but I stayed at home all the time though, I just don't understand how they are all fine?"    Pain/Comfort:  · Pain Rating 1: 0/10    Objective:     Oral Musculature Evaluation  · Oral Musculature: general weakness  · Dentition: uses dentures to eat, lower dentures  · Secretion Management: adequate  · Mucosal Quality: adequate  · Mandibular Strength and Mobility: WNL  · Oral Labial Strength and Mobility: WNL  · Lingual Strength and Mobility: WNL  · Volitional Cough: elicited  · Volitional Swallow: elicited, timely  · Voice Prior to PO Intake: clear, mildly decreased intensity    Bedside Swallow Eval:   Consistencies Assessed:  · Thin liquids : cup edge sips self-fed x5  · Puree : tsp bites x4  · Soft solids : tsp bites x2  · Solids : bites of cracker x2     Oral Phase:   · Prolonged mastication    Pharyngeal Phase:   · no overt clinical signs/symptoms of aspiration    Compensatory Strategies  · None    Treatment: Pt found awake in bed with call light in reach. She repositioned herself in bed and HOB elevated. She was attentive to ST yet occasional delayed response time noted " t/o conversational tasks. She endorsed minimal appetite yet accepted PO trials willingly. No overt S/S aspiration with trials accepted. Prolonged mastication with solids noted yet adequate oral clearance. Pt without upper dentures on campus. SLP asked if there was a family member who could be contacted to bring dentures to campus yet Patient declined across attempts. Pt was educated on SLP's role,  diet recommendations, aspiration precautions, ongoing monitoring for S/S aspiration and SLP POC. She verbalized understanding. No questions noted. Whiteboard updated. PT/OT in room to work with Pt as SLP exited room. RN notified of findings.     Assessment:     Brittney Freitas is a 80 y.o. female with an SLP diagnosis of Mild Oral Dysphagia 2/2 dentition .  She presents with risk of aspiration 2/2 fatigue and decreased endurance. ST to continue to follow.    Goals:   Multidisciplinary Problems     SLP Goals        Problem: SLP Goal    Goal Priority Disciplines Outcome   SLP Goal     SLP Ongoing, Progressing   Description: Speech Language Pathology Goals  Goals expected to be met by 11/18/20    1. Pt will participate in ongoing assessment of swallow function to determine safest, least restrictive means of nutrition/hydration  2. Pending progress, Pt will participate in further assessment of cognitive-linguistic skills to determine ongoing POC needs   3. Educate Pt and family on aspiration precautions and SLP POC                       Plan:     · Patient to be seen:  3 x/week   · Plan of Care expires:  12/11/20  · Plan of Care reviewed with:  patient   · SLP Follow-Up:  Yes       Discharge recommendations:  home health speech therapy(pending progress)     Time Tracking:     SLP Treatment Date:   11/11/20  Speech Start Time:  1044  Speech Stop Time:  1104     Speech Total Time (min):  20 min    Billable Minutes: Eval Swallow and Oral Function 10 and Seld Care/Home Management Training 10    MITCH Wood.,  CCC-SLP  Speech-Language Pathology  Pager: [phone]    11/11/2020

## 2020-11-11 NOTE — PLAN OF CARE
Problem: SLP Goal  Goal: SLP Goal  Description: Speech Language Pathology Goals  Goals expected to be met by 11/18/20    1. Pt will participate in ongoing assessment of swallow function to determine safest, least restrictive means of nutrition/hydration  2. Pending progress, Pt will participate in further assessment of cognitive-linguistic skills to determine ongoing POC needs   3. Educate Pt and family on aspiration precautions and SLP POC      Outcome: Ongoing, Progressing     SLP Bedside Swallow Study completed. Pt presents with mild oral dysphagia 2/2 dentition. Risk of aspiration remains 2/2 fatigue. REC: continue current, Level V mechanical soft textures with thin liquids, medications one at a time ok, provided strict aspiration precautions and Supervision with PO. RN notified of upper dentures not present on campus. ST to continue to follow.     MITCH Wood., [facility]-SLP  Speech-Language Pathology  Pager: [phone]  11/11/2020

## 2020-11-11 NOTE — PT/OT/SLP EVAL
Occupational Therapy   Co-Evaluation/Treatment    Name: Brittney Freitas  MRN: [id number]  Admitting Diagnosis:  COVID-19      Recommendations:     Discharge Recommendations: home health PT, home health OT  Discharge Equipment Recommendations:  shower chair  Barriers to discharge:  Decreased caregiver support    Assessment:     Brittney Freitas is a 80 y.o. female with a medical diagnosis of COVID-19.  She presents with performance deficits affecting function: weakness, impaired endurance, impaired self care skills, impaired functional mobilty, gait instability, impaired balance, impaired cardiopulmonary response to activity.  Pt tolerated session well this date. Pt would benefit from continued skilled acute OT services in order to maximize independence and safety with ADLs and functional mobility to ensure safe return to PLOF in the least restrictive environment. OT recommending HH PT/OT  once pt is medically appropriate for d/c.     Rehab Prognosis: Good; patient would benefit from acute skilled OT services to address these deficits and reach maximum level of function.       Plan:     Patient to be seen 3 x/week to address the above listed problems via self-care/home management, therapeutic activities, therapeutic exercises  · Plan of Care Expires: 12/10/20  · Plan of Care Reviewed with: patient    Subjective     Chief Complaint: none stated   Patient/Family Comments/goals: to get better and return home     Occupational Profile:  Living Environment: Pt lives alone in a [facility] with 1 [address]. Pt has a walk-in shower with grab bar present. Pt was driving.   Previous level of function: PTA, pt was ( I) with ADLs and mod (I) for functional mobility  Roles and Routines: home dweller   Equipment Used at Home:  rollator, bedside commode  Assistance upon Discharge: Pt will have assistance from family upon d/c     Pain/Comfort:  · Pain Rating 1: 0/10  · Pain Rating Post-Intervention 1: 0/10    Patients cultural,  spiritual, [employer]ism conflicts given the current situation: no    Objective:     Communicated with: RN prior to session.  Patient found HOB elevated with peripheral IV, telemetry, pulse ox (continuous) upon OT entry to room. Pt agreeable to therapy session.     General Precautions: Standard, airborne, droplet, fall, contact   Orthopedic Precautions:  N/A  Braces: N/A     Occupational Performance:    Bed Mobility:    · Patient completed Scooting/Bridging with stand by assistance  · Patient completed Supine to Sit with stand by assistance    Functional Mobility/Transfers:  · Patient completed Sit <> Stand Transfer with contact guard assistance  with  rolling walker   · Verbal cues for hand placement on RW  · Patient completed Toilet Transfer with functional ambulation and step transfer technique with contact guard assistance with  rolling walker and grab bars  · Functional Mobility: Pt engaging in functional mobility to simulate household/community distances with CGA initially progressing to SBA  and utilizing RW in order to maximize functional activity tolerance and standing balance required for engagement in occupations of choice.   · Pt on 2L of oxygen  · VSS   · No LOB but slow pacing.     Activities of Daily Living:  · Grooming: stand by assistance pt completed hand hygiene standing at the sink   · Upper Body Dressing: minimum assistance donning gown like robe while sitting EOB   · Toileting: moderate assistance for hygiene in standing and clothing management     Cognitive/Visual Perceptual:  Cognitive/Psychosocial Skills:     -       Oriented to: Person, Place, Time and Situation   -       Follows Commands/attention:Follows two-step commands  -       Communication: clear/fluent  -       Memory: No Deficits noted  -       Safety awareness/insight to disability: intact   -       Mood/Affect/Coping skills/emotional control: Appropriate to situation  Visual/Perceptual:      -Intact      Physical Exam:  Balance:      Static sit: SBA <>(S)   Dynamic sit: SBA   Static standing: CGA using RW   Dynamic Standing: CGA     Postural examination/scapula alignment:    -       Rounded shoulders  -       Forward head  Skin integrity: Visible skin intact  Edema:  None noted  Sensation:    -       Intact  Dominant hand:    -       right  Upper Extremity Range of Motion:     -       Right Upper Extremity: WFL  -       Left Upper Extremity: WFL  Upper Extremity Strength:    -       Right Upper Extremity: WFL  -       Left Upper Extremity: WFL    AMPAC 6 Click ADL:  AMPAC Total Score: 19    Treatment & Education:   Pt educated on role of OT, POC, and goals for therapy.     POC was dicussed with patient/caregiver, who was included in its development and is in agreement with the identified goals and treatment plan.    Time provided for therapeutic counseling and discussion of health disposition.    Educated on importance of EOB/OOB mobility, maintaining routine, sitting up in chair, and maximizing independence with ADLs during admission    Pt completed ADLs and functional mobility for treatment session as noted above    Pt/caregiver verbalized understanding and expressed no further concerns/questions.   Updated communication board with level of assist required (CGA x 1 person assistance & using RW) & educated RN/patient that pt is appropriate for transfers and mobility with RN/PCT.     Co-treatment performed due to patient's multiple deficits requiring two skilled therapists to appropriately and safely assess patient's strength and endurance while facilitating functional tasks in addition to accommodating for patient's activity tolerance.   Education:    Patient left up in chair with all lines intact, call button in reach and RN notified    GOALS:   Multidisciplinary Problems     Occupational Therapy Goals        Problem: Occupational Therapy Goal    Goal Priority Disciplines Outcome Interventions   Occupational Therapy Goal     OT,  PT/OT Ongoing, Progressing    Description: Goals to be met by: 11/25/2020     Patient will increase functional independence with ADLs by performing:    UE Dressing with Supervision.  LE Dressing with Supervision.  Grooming while standing at sink with Supervision.  Toileting from toilet with Supervision for hygiene and clothing management.   Toilet transfer to toilet with Supervision.                     History:     Past Medical History:   Diagnosis Date    Diabetes mellitus, type 2     Hypertension     Reflux esophagitis     Seasonal allergies        Past Surgical History:   Procedure Laterality Date    BACK SURGERY      EPIDURAL STEROID INJECTION N/A 6/18/2020    Procedure: ARACELY C7-T1;  Surgeon: Nicholas Pardo MD;  Location: [facility] OR;  Service: Pain Management;  Laterality: N/A;    EPIDURAL STEROID INJECTION INTO CERVICAL SPINE N/A 9/30/2019    Procedure: Injection-steroid-epidural-cervical;  Surgeon: Nicholas Pardo MD;  Location: [facility] OR;  Service: Pain Management;  Laterality: N/A;  C7-T1    KNEE SURGERY      ovarian tumor      SHOULDER ARTHROSCOPY         Time Tracking:     OT Date of Treatment: 11/11/20  OT Start Time: 1105  OT Stop Time: 1130  OT Total Time (min): 25 min    Billable Minutes:Evaluation 15  Self Care/Home Management 8    Angella Alonzo, OT  11/11/2020

## 2020-11-12 LAB
ALBUMIN SERPL BCP-MCNC: 3 G/DL (ref 3.5–5.2)
ALP SERPL-CCNC: 42 U/L (ref 55–135)
ALT SERPL W/O P-5'-P-CCNC: 14 U/L (ref 10–44)
ANION GAP SERPL CALC-SCNC: 16 MMOL/L (ref 8–16)
ANISOCYTOSIS BLD QL SMEAR: SLIGHT
AST SERPL-CCNC: 22 U/L (ref 10–40)
BASOPHILS # BLD AUTO: 0.01 K/UL (ref 0–0.2)
BASOPHILS NFR BLD: 0.2 % (ref 0–1.9)
BILIRUB SERPL-MCNC: 0.3 MG/DL (ref 0.1–1)
BUN SERPL-MCNC: 20 MG/DL (ref 8–23)
CALCIUM SERPL-MCNC: 8.1 MG/DL (ref 8.7–10.5)
CHLORIDE SERPL-SCNC: 105 MMOL/L (ref 95–110)
CO2 SERPL-SCNC: 22 MMOL/L (ref 23–29)
CREAT SERPL-MCNC: 0.7 MG/DL (ref 0.5–1.4)
DIFFERENTIAL METHOD: ABNORMAL
EOSINOPHIL # BLD AUTO: 0 K/UL (ref 0–0.5)
EOSINOPHIL NFR BLD: 0 % (ref 0–8)
ERYTHROCYTE [DISTWIDTH] IN BLOOD BY AUTOMATED COUNT: 13.2 % (ref 11.5–14.5)
EST. GFR  (AFRICAN AMERICAN): >60 ML/MIN/1.73 M^2
EST. GFR  (NON AFRICAN AMERICAN): >60 ML/MIN/1.73 M^2
FERRITIN SERPL-MCNC: 351 NG/ML (ref 20–300)
GLUCOSE SERPL-MCNC: 140 MG/DL (ref 70–110)
HCT VFR BLD AUTO: 35.2 % (ref 37–48.5)
HGB BLD-MCNC: 11.4 G/DL (ref 12–16)
HYPOCHROMIA BLD QL SMEAR: ABNORMAL
IMM GRANULOCYTES # BLD AUTO: 0.04 K/UL (ref 0–0.04)
IMM GRANULOCYTES NFR BLD AUTO: 0.8 % (ref 0–0.5)
LYMPHOCYTES # BLD AUTO: 1 K/UL (ref 1–4.8)
LYMPHOCYTES NFR BLD: 19.4 % (ref 18–48)
MAGNESIUM SERPL-MCNC: 2.2 MG/DL (ref 1.6–2.6)
MCH RBC QN AUTO: 30.1 PG (ref 27–31)
MCHC RBC AUTO-ENTMCNC: 32.4 G/DL (ref 32–36)
MCV RBC AUTO: 93 FL (ref 82–98)
MONOCYTES # BLD AUTO: 0.4 K/UL (ref 0.3–1)
MONOCYTES NFR BLD: 8.5 % (ref 4–15)
NEUTROPHILS # BLD AUTO: 3.7 K/UL (ref 1.8–7.7)
NEUTROPHILS NFR BLD: 71.1 % (ref 38–73)
NRBC BLD-RTO: 0 /100 WBC
OVALOCYTES BLD QL SMEAR: ABNORMAL
PHOSPHATE SERPL-MCNC: 3.5 MG/DL (ref 2.7–4.5)
PLATELET # BLD AUTO: 113 K/UL (ref 150–350)
PLATELET BLD QL SMEAR: ABNORMAL
PMV BLD AUTO: 11.4 FL (ref 9.2–12.9)
POCT GLUCOSE: 129 MG/DL (ref 70–110)
POCT GLUCOSE: 136 MG/DL (ref 70–110)
POCT GLUCOSE: 196 MG/DL (ref 70–110)
POIKILOCYTOSIS BLD QL SMEAR: SLIGHT
POLYCHROMASIA BLD QL SMEAR: ABNORMAL
POTASSIUM SERPL-SCNC: 3.6 MMOL/L (ref 3.5–5.1)
PROT SERPL-MCNC: 6.4 G/DL (ref 6–8.4)
RBC # BLD AUTO: 3.79 M/UL (ref 4–5.4)
SODIUM SERPL-SCNC: 143 MMOL/L (ref 136–145)
WBC # BLD AUTO: 5.15 K/UL (ref 3.9–12.7)

## 2020-11-12 PROCEDURE — 85025 COMPLETE CBC W/AUTO DIFF WBC: CPT

## 2020-11-12 PROCEDURE — 94799 UNLISTED PULMONARY SVC/PX: CPT

## 2020-11-12 PROCEDURE — 94640 AIRWAY INHALATION TREATMENT: CPT

## 2020-11-12 PROCEDURE — 25000003 PHARM REV CODE 250: Performed by: HOSPITALIST

## 2020-11-12 PROCEDURE — 92526 ORAL FUNCTION THERAPY: CPT

## 2020-11-12 PROCEDURE — 20600001 HC STEP DOWN PRIVATE ROOM

## 2020-11-12 PROCEDURE — 25000003 PHARM REV CODE 250: Performed by: INTERNAL MEDICINE

## 2020-11-12 PROCEDURE — 99233 SBSQ HOSP IP/OBS HIGH 50: CPT | Mod: ,,, | Performed by: INTERNAL MEDICINE

## 2020-11-12 PROCEDURE — 84100 ASSAY OF PHOSPHORUS: CPT

## 2020-11-12 PROCEDURE — 92523 SPEECH SOUND LANG COMPREHEN: CPT

## 2020-11-12 PROCEDURE — 99233 PR SUBSEQUENT HOSPITAL CARE,LEVL III: ICD-10-PCS | Mod: ,,, | Performed by: INTERNAL MEDICINE

## 2020-11-12 PROCEDURE — 27000221 HC OXYGEN, UP TO 24 HOURS

## 2020-11-12 PROCEDURE — 94664 DEMO&/EVAL PT USE INHALER: CPT

## 2020-11-12 PROCEDURE — 63600175 PHARM REV CODE 636 W HCPCS: Performed by: INTERNAL MEDICINE

## 2020-11-12 PROCEDURE — 97535 SELF CARE MNGMENT TRAINING: CPT

## 2020-11-12 PROCEDURE — 80053 COMPREHEN METABOLIC PANEL: CPT

## 2020-11-12 PROCEDURE — 82728 ASSAY OF FERRITIN: CPT

## 2020-11-12 PROCEDURE — 94761 N-INVAS EAR/PLS OXIMETRY MLT: CPT

## 2020-11-12 PROCEDURE — 83735 ASSAY OF MAGNESIUM: CPT

## 2020-11-12 PROCEDURE — 99900035 HC TECH TIME PER 15 MIN (STAT)

## 2020-11-12 PROCEDURE — 36415 COLL VENOUS BLD VENIPUNCTURE: CPT

## 2020-11-12 RX ORDER — DULOXETIN HYDROCHLORIDE 30 MG/1
30 CAPSULE, DELAYED RELEASE ORAL DAILY
Status: DISCONTINUED | OUTPATIENT
Start: 2020-11-12 | End: 2020-11-14 | Stop reason: HOSPADM

## 2020-11-12 RX ADMIN — CEFTRIAXONE SODIUM 1 G: 1 INJECTION, POWDER, FOR SOLUTION INTRAMUSCULAR; INTRAVENOUS at 05:11

## 2020-11-12 RX ADMIN — PANTOPRAZOLE SODIUM 40 MG: 40 TABLET, DELAYED RELEASE ORAL at 10:11

## 2020-11-12 RX ADMIN — REMDESIVIR 100 MG: 100 INJECTION, POWDER, LYOPHILIZED, FOR SOLUTION INTRAVENOUS at 06:11

## 2020-11-12 RX ADMIN — DEXAMETHASONE 6 MG: 4 TABLET ORAL at 10:11

## 2020-11-12 RX ADMIN — THERA TABS 1 TABLET: TAB at 10:11

## 2020-11-12 RX ADMIN — ALBUTEROL SULFATE 2 PUFF: 90 AEROSOL, METERED RESPIRATORY (INHALATION) at 08:11

## 2020-11-12 RX ADMIN — CHOLECALCIFEROL TAB 25 MCG (1000 UNIT) 1000 UNITS: 25 TAB at 10:11

## 2020-11-12 RX ADMIN — OXYCODONE HYDROCHLORIDE AND ACETAMINOPHEN 500 MG: 500 TABLET ORAL at 10:11

## 2020-11-12 RX ADMIN — FOLIC ACID 1 MG: 1 TABLET ORAL at 10:11

## 2020-11-12 RX ADMIN — LISINOPRIL 20 MG: 20 TABLET ORAL at 10:11

## 2020-11-12 RX ADMIN — ALBUTEROL SULFATE 2 PUFF: 90 AEROSOL, METERED RESPIRATORY (INHALATION) at 02:11

## 2020-11-12 RX ADMIN — ALBUTEROL SULFATE 2 PUFF: 90 AEROSOL, METERED RESPIRATORY (INHALATION) at 12:11

## 2020-11-12 RX ADMIN — DULOXETINE 30 MG: 30 CAPSULE, DELAYED RELEASE ORAL at 01:11

## 2020-11-12 NOTE — PROGRESS NOTES
Hospital Medicine  Progress Note  [facility]      Patient Name: Brittney Freitas  MRN:  [id number]  Hospital Medicine Team: [facility] HOSP MED G Pb García MD  Date of Admission:  11/10/2020     Length of Stay:  LOS: 2 days     Principal Problem:  COVID-19      Fazal: Patient is a 80 y.o. female with PMHx of Diabetes mellitus, type 2, Hypertension, Reflux esophagitis, and Seasonal allergies presented with confusion, fever, and shortness of breath. Patient presented to [place] ED and was found to have fever of 101 and hypoxia satting 88-89% on RA. Chest xray with bilateral infiltrates. CT head with no acute changes. Lactic acid 1.3. COVID positive. Patient treated with IV Rocephin and azithromycin. Also given IV steroids. BP running in low 100's systolic improved after IVF's.  She does not recall how long she was confused for but she tested positive for COVID about 4 days ago. Reportedly multiple family members tested positive for COVID. She reprots she stays in her house alone in [place] and uses a walker to ambulate. She is independent in her ADLs at baseline. Patient admitted for evaluation of acute respiratory failure with hypoxia secondary to COVID-19.    Interval History:     Improved mentation this morning. A&ox3. Lives in [place] alone. On 2L NC o2. Feels better today prior to yesterday. Found her dentures so able to eat normal food. Mental status has returned to baseline.    Review of Systems:  ROS (Positive in Bold, otherwise negative)  Constitutional: fever, chills, night sweats, malaise, weakness  CV: chest pain, edema, palpitations  Resp: SOB, cough, sputum production  GI: changes in appetite, NVDC, pain, melena, hematochezia, GERD, hematemesis  : Dysuria, hematuria, urinary urgency, frequency  MSK: arthralgia/myalgia, joint swelling  Neuro/Psych: anxiety, depression    Inpatient Medications:    Current Facility-Administered Medications:     acetaminophen tablet  650 mg, 650 mg, Oral, Q4H PRN, Ernesto Bermudez MD    albuterol inhaler 2 puff, 2 puff, Inhalation, Q6H, 2 puff at 11/12/20 0819 **AND** MDI Q6H, , , Q6H, Ernesto Bermudez MD    ascorbic acid (vitamin C) tablet 500 mg, 500 mg, Oral, BID, Kylie Preston MD, 500 mg at 11/11/20 2012    benzonatate capsule 100 mg, 100 mg, Oral, TID PRN, Kylie Preston MD, 100 mg at 11/10/20 2036    cefTRIAXone injection 1 g, 1 g, Intravenous, Q24H, Ernesto Bermudez MD, 1 g at 11/11/20 1516    dexAMETHasone tablet 6 mg, 6 mg, Oral, Daily, Ernesto Bermudez MD, 6 mg at 11/11/20 0933    dextromethorphan-guaifenesin  mg/5 ml liquid 10 mL, 10 mL, Oral, Q4H PRN, Ernesto Bermudez MD    dextrose 50% injection 12.5 g, 12.5 g, Intravenous, PRN, Ernesto Bermudez MD    dextrose 50% injection 25 g, 25 g, Intravenous, PRN, Ernesto Bermudez MD    folic acid tablet 1 mg, 1 mg, Oral, Daily, Kylie Preston MD, 1 mg at 11/11/20 0934    glucagon (human recombinant) injection 1 mg, 1 mg, Intramuscular, PRN, Ernesto Bermudze MD    glucose chewable tablet 16 g, 16 g, Oral, PRN, Ernesto Bermudez MD    glucose chewable tablet 24 g, 24 g, Oral, PRN, Ernesto Bermudez MD    insulin aspart U-100 pen 0-5 Units, 0-5 Units, Subcutaneous, QID (AC + HS) PRN, Ernesto Bermudez MD, 2 Units at 11/10/20 0917    lisinopriL tablet 20 mg, 20 mg, Oral, Daily, Kylie Preston MD, 20 mg at 11/11/20 0934    multivitamin tablet, 1 tablet, Oral, Daily, Kylie Preston MD, 1 tablet at 11/11/20 1145    ondansetron disintegrating tablet 8 mg, 8 mg, Oral, Q8H PRN, Erensto Bermudez MD    pantoprazole EC tablet 40 mg, 40 mg, Oral, Daily, Kylie Preston MD, 40 mg at 11/11/20 0933    promethazine suppository 25 mg, 25 mg, Rectal, Q6H PRN, Ernesto Bermudez MD    [COMPLETED] remdesivir 200 mg in sodium chloride 0.9% 250 mL infusion, 200 mg, Intravenous, Once, 200 mg at 11/10/20 1722 **FOLLOWED BY** remdesivir 100 mg in sodium chloride 0.9% 250 mL infusion, 100 mg, Intravenous, Q24H, Kylie Preston MD, 100 mg  at 11/11/20 1752    sodium chloride 0.9% flush 10 mL, 10 mL, Intravenous, PRN, Ernesto Bermudez MD    vitamin D 1000 units tablet 1,000 Units, 1,000 Units, Oral, Daily, Ernesto Bermudez MD, 1,000 Units at 11/11/20 0933    Facility-Administered Medications Ordered in Other Encounters:     lactated ringers infusion, , Intravenous, Continuous, Nicholas Pardo MD, Last Rate: 25 mL/hr at 09/30/19 1439      Physical Exam:      Intake/Output Summary (Last 24 hours) at 11/12/2020 0939  Last data filed at 11/11/2020 1900  Gross per 24 hour   Intake 250 ml   Output --   Net 250 ml     Wt Readings from Last 3 Encounters:   11/09/20 77.1 kg (170 lb)   09/22/20 89.4 kg (196 lb 15.7 oz)   08/05/20 88.1 kg (194 lb 3.6 oz)       BP (!) 105/54   Pulse 65   Temp 98.2 °F (36.8 °C)   Resp 20   SpO2 (!) 93%     GEN: NAD, conversant  Resp: coarse bilateral breath sounds, no wheezes or rales, normal work of breathing   CV: RRR, no m/r/g, no edema  GI: soft, NTND  Skin: no rash    Laboratory:  Lab Results   Component Value Date    [id number] Positive (A) 11/09/2020       Recent Labs   Lab 11/10/20  1044 11/11/20  0244 11/12/20  0308   WBC 4.94 8.11 5.15   LYMPH 13.8*  0.7* 15.5*  1.3 19.4  1.0   HGB 11.8* 11.3* 11.4*   HCT 35.5* 35.4* 35.2*    171 113*     Recent Labs   Lab 11/10/20  0734 11/11/20  0244 11/12/20  0308    140 143   K 4.0 3.7 3.6    105 105   CO2 27 22* 22*   BUN 14 17 20   CREATININE 0.6 0.6 0.7   * 116* 140*   CALCIUM 8.3* 8.1* 8.1*   MG 1.8 1.8 2.2   PHOS 3.0 2.3* 3.5     Recent Labs   Lab 11/10/20  0734 11/11/20  0244 11/12/20  0308   ALKPHOS 48* 41* 42*   ALT 13 12 14   AST 22 24 22   ALBUMIN 3.2* 2.9* 3.0*   PROT 7.1 6.4 6.4   BILITOT 0.3 0.3 0.3   INR 0.9  --   --         Recent Labs     11/09/20  2142 11/10/20  0734 11/12/20  0308   FERRITIN  --  295 351*   BNP  --  12  --    TROPONINI 0.06  --   --        All labs within the last 24 hours were reviewed.     Microbiology:  Microbiology  Results (last 7 days)     Procedure Component Value Units Date/Time    Blood culture (site 1) [[id number]] Collected: 11/10/20 0734    Order Status: Completed Specimen: Blood from Antecubital, Left Arm Updated: 11/11/20 1012     Blood Culture, Routine No Growth to date      No Growth to date    Narrative:      Site # 1, aerobic and anaerobic    Blood culture (site 2) [[id number]] Collected: 11/10/20 0744    Order Status: Completed Specimen: Blood from Antecubital, Right Arm Updated: 11/11/20 1012     Blood Culture, Routine No Growth to date      No Growth to date    Narrative:      Site # 2, aerobic only            Imaging      No results found for this or any previous visit.    X-Ray Chest AP Portable  Narrative: EXAMINATION:  XR CHEST AP PORTABLE    CLINICAL HISTORY:  . Essential (primary) hypertension    TECHNIQUE:  Single frontal portable view of the chest was performed.    COMPARISON:  None    FINDINGS:  There is mild chronic interstitial change.  Minimal linear discoid atelectasis is present at the lung bases.  No focal consolidation.  No significant pleural effusion.    Heart size is normal.  Mediastinal contours unremarkable.  Trachea midline.    Bony thorax intact.  Impression: Mild chronic interstitial change without focal consolidation.    Electronically signed by: Chris Nguyen  Date:    11/10/2020  Time:    07:32  CT Head Without Contrast  Narrative: EXAMINATION:  CT HEAD WITHOUT CONTRAST    CLINICAL HISTORY:  Altered mental status;    TECHNIQUE:  Low dose axial images were obtained through the head.  Coronal and sagittal reformations were also performed. Contrast was not administered.    COMPARISON:  None.    FINDINGS:  There is no acute hemorrhage or infarction.  There is cortical atrophy.  There are periventricular deep white matter changes consistent with chronic small vessel ischemic disease.  Small remote lacunar infarcts of the bilateral basal ganglia.    No extra-axial fluid collections.   Ventricles are normal in size, shape and configuration.  The basal cisterns are patent.    Prior paranasal sinus surgery.  Mild circumferential mucoperiosteal thickening involving the maxillary sinuses.  Moderate circumferential mucoperiosteal thickening of the frontal and sphenoid sinuses.  Near complete opacification of the ethmoid air cells.    The mastoid air cells and middle ears are normally pneumatized.  Impression: 1. Cortical atrophy with periventricular deep white matter change consistent with chronic small vessel ischemic disease.  2. Small remote lacunar infarcts of the bilateral basal ganglia.  3. Chronic pansinusitis.  The preliminary and final reports are concordant.    Electronically signed by: Chris Nguyen  Date:    11/10/2020  Time:    07:19      All imaging within the last 24 hours was reviewed.     Assessment and Plan:    Active Hospital Problems    Diagnosis  POA    *COVID-19 [U07.1]  Yes    Acute hypoxemic respiratory failure [J96.01]  Yes    Pneumonia due to COVID-19 virus [U07.1, J12.89]  Yes    Thrombocytopenia [D69.6]  Yes    Osteoporosis [M81.0]  Yes    Type 2 diabetes mellitus [E11.9]  Yes    Benign hypertension [I10]  Yes    Impaired cognition [R41.89]  Yes    Adjustment disorder with mixed emotional features [F43.29]  Yes      Resolved Hospital Problems   No resolved problems to display.       COVID-19 Virus Infection:  Viral Pneumonia due to COVID-19:  -Pt tested for COVID-19 and noted to be positive  -CXR shows Mild chronic interstitial change without focal consolidation.  -Isolation: Airborne/Droplet. Surgical mask on patient. Notify Infection Control  -Management: per [facility] COVID Treatment Protocol (4/15/20)  -Monitoring: Telemetry & Continuous Pulse Oximetry  -Antibiotics: started on ceftriaxone 1g Q24h x 5 days and azithromycin 500mg po x1, then 250mg po daily x 4 days  -Nutrition:    -Multivitamin PO daily   -Add Boost supplement   -Vitamin D 1000IU daily if  deficient   -Ascorbic acid 500mg PO bid  -Supportive Care:   -Acetaminophen 650mg PO Q6hr PRN fever/headache   -Loperamide PRN viral diarrhea   -Robitussin, tessalon perls for cough   -IVF if indicated, restrictive strategy preferred, no maintenance IV if able   -Investigational Therapies:  -Atorvastatin 40mg po daily   -Due to hypoxia, pt started on dexamethasone 6mg PO daily up to 10 days or until pt is discharged from hospital (whichever is sooner)   -Pt meets criteria for remdesivir. Pt provided verbal consent to start this medication and it being ordered/dosed per ID.     Acute Hypoxemic Respiratory Failure:  -patient currently requiring 2 L O2 via nasal cannula  -RT consult via Respiratory Communication for COVID Protocols  -If wheezing, albuterol INH Q6h scheduled & PRN  -Incentive Spirometer Q4h  -Flutter Valve Q4h  -Continuous pulse oximetry; titrate oxygen to keep sats between 92-96%  -Wean off O2 as tolerated    -Supplemental O2 via LFNC, VentiMask, or HFNC (see Respiratory Support Oxygen Therapies)  -Proning Protocol if patient is a candidate with GCS >13 and able to self-prone (see HM Proning Protocol)  -If deterioration, may warrant trial of NIPPV and transfer to [facility] pressure room or immediate ICU consult    Acute Encephalopathy:  resolved  Unclear if infectious vs medication induced  Holding sedating medications can add back as tolerated.    Thrombocytopenia:  -has a history of ITP  -heme/onc consulted, appreciate recs  -no active signs of bleeding  -holding lovenox  -order peripheral smear  -transfuse plt if plt count drops < 71307    Hypertension:  -stable  -Continue PTA  lisinopril 20  -monitor vitals q4h  -SBP goal of <160 in hospital    Mood disorder:  Held on admit 2/2 AMS  Will start back duloxetine today and can add back sequentially as indicated.  -hold home BuSpar, Lexapro, Seroquel    Chronic pain:  -hold chronic pain medications 2/2 confusion, add back as needed  -hold home gabapentin,  Norco, tramadol  No complains of pain today will continue to hold.    Hypophosphatemia:  Replete today.  -replete and monitor     Goals of care, counseling/discussion  -Reviewed the typical clinical course of COVID19 with pt, including the potential for acute decompensation requiring intubation and mechanical ventilation  -Discussed again as part of routine daily evaluation, patient/POA maintains code status of FULL    VTE High Risk Prophylaxis: SCDs    Dispo: DC home once medically ready.     Subsequent Hospital Care:   Level 3 04604 Total visit time was 35 minutes or greater with greater than 50% of time spent in counseling and coordination of care.       Pb García MD  Hospital Medicine  Pager: [phone]  Spectra; [phone]

## 2020-11-12 NOTE — NURSING
Called into room, patient with small amount of blod in sputum. Will continue to monitor for frequency and amount.

## 2020-11-12 NOTE — CONSULTS
[facility] Medicine  Telemedicine Consult Note    Patient Name: Brittney Freitas  MRN: [id number]  Admission Date: 11/10/2020  Hospital Length of Stay: 2 days  Attending Physician: Pb García MD   Primary Care Provider: Andry Byrnes MD         Brittney Freitas has been accepted for transfer to [facility] and will be followed through telemedicine services beginning 11/13/20 at 7 AM.          Zenaida Umaña MD  Department of Hospital Medicine   [facility]

## 2020-11-12 NOTE — PLAN OF CARE
Problem: SLP Goal  Goal: SLP Goal  Description: Speech Language Pathology Goals  Goals expected to be met by 11/18/20    1. Pt will tolerate a regular diet with thin liquids w/o overt S/S aspiration, MOD I  2. Pt will name 10+ items in a concrete category, 90% of attempts, MOD I  3. Educate Pt and family on aspiration precautions and SLP POC  4. Pt will recall 3/3 unrelated items for immediate recall and post 3 minute filled delay, 90% of attempts, MOD I  5. Pt will complete further assessment of math, reading, writing and visiospatial skills to determine ongoing POC needs    Outcome: Ongoing, Progressing     Pt seen for ongoing swallow assessment and further assessment of cognitive-linguistic skills.  Patient now with dentures present.  Persistent, mild word-finding difficulty evident t/o assessment.  REC: regular diet with thin liquids, whole medications ok one at a time, provided supervision and standard aspiration precautions. ST to continue to follow.     MITCH Wood., CCC-SLP  Speech-Language Pathology  Pager: [phone]  11/12/2020

## 2020-11-12 NOTE — NURSING
"Patient sitting up in bed, talking on telephone. Appears in no distress at present. Continues on O2 via nasal cannula. Voices no c/o distress, states "I feel a lot better today." VS stable. Continue to monitor.  "

## 2020-11-12 NOTE — PT/OT/SLP EVAL
Speech Language Pathology Evaluation  Cognitive Communication    Patient Name:  Brittney Freitas   MRN:  [id number]  Admitting Diagnosis: COVID-19    Recommendations:     Recommendations:                General Recommendations:  Cognitive-linguistic therapy  Diet recommendations:  Regular, Thin   Aspiration Precautions: Supervision with PO for safety , 1 bite/sip at a time, Eliminate distractions, Feed only when awake/alert, Frequent oral care, HOB to 90 degrees, Meds whole 1 at a time and Standard aspiration precautions   General Precautions: Standard, airborne, aspiration, contact, fall, droplet  Communication strategies:  go to room if call light pushed    History:     Past Medical History:   Diagnosis Date    Diabetes mellitus, type 2     Hypertension     Reflux esophagitis     Seasonal allergies        Past Surgical History:   Procedure Laterality Date    BACK SURGERY      EPIDURAL STEROID INJECTION N/A 6/18/2020    Procedure: ARACELY C7-T1;  Surgeon: Nicholas Pardo MD;  Location: [facility] OR;  Service: Pain Management;  Laterality: N/A;    EPIDURAL STEROID INJECTION INTO CERVICAL SPINE N/A 9/30/2019    Procedure: Injection-steroid-epidural-cervical;  Surgeon: Nicholas Pardo MD;  [facility];  Service: Pain Management;  Laterality: N/A;  C7-T1    KNEE SURGERY      ovarian tumor      SHOULDER ARTHROSCOPY         Social History: Patient lives alone in home in [place].     Prior Intubation HX:  None this admission      Modified Barium Swallow: none prior at this facility      Chest X-Rays: 11/10/20: Mild chronic interstitial change without focal consolidation.     CT head: 11/10/20: 1. Cortical atrophy with periventricular deep white matter change consistent with chronic small vessel ischemic disease.  2. Small remote lacunar infarcts of the bilateral basal ganglia.  3. Chronic pansinusitis.  The preliminary and final reports are concordant.     Prior diet: regular, thin  "     Occupation/hobbies/homemaking: Retired, Independent with ADLS prior to admit.    Subjective     SLP reviewed with RN, RN reported Pt doing well, tolerating diet  Pt presents calm  She explains, "That has never happened before" re BRB in mucous in tissue  RN and MD notified of blood in mucous  Patient goals: to get home    Pain/Comfort:  · Pain Rating 1: 0/10    Objective:   Cognitive Status:    Attention No obvious deficits observed at the sustained level of attention  Orientation Oriented x4  Memory Immediate Recall Pt recalled 3/3 nderlated items for immediate recall, Delayed: pt recalled 2/3 items post 2 minute filled delay I'ly  and recall general information : 80% accuracy I'ly   Problem Solving Solutions : Pt provided appropriate solutions to household situations 4/4 attempts, I'ly. She sequenced a FO3 items verbally presented I'ly  Safety awareness : Pt aware of call light/fall precautions   Simple calculation : TBA       Receptive Language:   Comprehension:      Questions Simple yes/no WNL  Complex yes/no WNL  Commands  One step WNL    Pragmatics:    Pt with appropriate affect and eye contact     Expressive Language:  Verbal:    Initiation :  delayed at times  Naming Confrontation WNL and Divergent responsive : Pt named 3 items/min in concrete category (WNL =15-20 items/minute.)   Conversational speech : Pt with occasional delayed initiation and hesitations at the conversational level. Pt aware of word-finding difficulty and explains frustration with word-finding       Motor Speech:  WFL    Voice:   Quality : clear  Intensity : adequate     Visual-Spatial:  TBA    Reading:   TBA     Written Expression:   TBA    Treatment: Pt wound awake in chair in room with call light in reach on room air. Pt noted to hold tissue with mucous coated with BRB. RN notified and in room to review and replace nasal canula to nares. RN cleared for PO trials. Pt dentures on bedside table. SLP assisted in cleaning and placing " dentures to oral cavity. Vocal quality clear. Pt with adequate lingual, labial and mandibular strength and coordination upon review of oral mechanism. She reported good appetite and explained further she did not like chopped textures on breakfast meal tray.  Pt seen with trials of solids (bites of saltine crackers x4) and thin liquids (straw sips x5.) No overt S/S aspiration with trials accepted. Pt with improved mastication and oral clearance with dentures present. SLP educated Pt on SLP role, word-finding strategies, aspiration precautions, diet recommendations and SLP POC. No questions noted. Whiteboard updated. Pt remained reclined in chair with call light in reach upon SLP exit/handoff with RN.     Assessment:   Brittney Freitas is a 80 y.o. female with an SLP diagnosis of mild cognitive-linguistic impairment.  She presents with improved mastication and oral clearance with dentures present this service day. RN and MD notified of blood in mucous upon ST entrance to room.     Goals:   Multidisciplinary Problems     SLP Goals        Problem: SLP Goal    Goal Priority Disciplines Outcome   SLP Goal     SLP Ongoing, Progressing   Description: Speech Language Pathology Goals  Goals expected to be met by 11/18/20    1. Pt will tolerate a regular diet with thin liquids w/o overt S/S aspiration, MOD I  2. Pt will name 10+ items in a concrete category, 90% of attempts, MOD I  3. Educate Pt and family on aspiration precautions and SLP POC  4. Pt will recall 3/3 unrelated items for immediate recall and post 3 minute filled delay, 90% of attempts, MOD I  5. Pt will complete further assessment of math, reading, writing and visiospatial skills to determine ongoing POC needs                     Plan:   · Patient to be seen:  3 x/week   · Plan of Care expires:  12/11/20  · Plan of Care reviewed with:  patient   · SLP Follow-Up:  Yes       Discharge recommendations:  Discharge Facility/Level of Care Needs: home health  speech therapy(pending progress)     Time Tracking:   SLP Treatment Date:   11/12/20  Speech Start Time:  1030  Speech Stop Time:  1103     Speech Total Time (min):  33 min    Billable Minutes: Eval 10 , Treatment Swallowing Dysfunction 8 and Seld Care/Home Management Training 15    MITCH Wood., [facility]-SLP  Speech-Language Pathology  Pager: [phone]    11/12/2020

## 2020-11-13 LAB
ALBUMIN SERPL BCP-MCNC: 2.9 G/DL (ref 3.5–5.2)
ALP SERPL-CCNC: 47 U/L (ref 55–135)
ALT SERPL W/O P-5'-P-CCNC: 16 U/L (ref 10–44)
ANION GAP SERPL CALC-SCNC: 12 MMOL/L (ref 8–16)
ANISOCYTOSIS BLD QL SMEAR: SLIGHT
AST SERPL-CCNC: 25 U/L (ref 10–40)
BASOPHILS # BLD AUTO: 0.04 K/UL (ref 0–0.2)
BASOPHILS NFR BLD: 0.5 % (ref 0–1.9)
BILIRUB SERPL-MCNC: 0.3 MG/DL (ref 0.1–1)
BUN SERPL-MCNC: 22 MG/DL (ref 8–23)
CALCIUM SERPL-MCNC: 8 MG/DL (ref 8.7–10.5)
CHLORIDE SERPL-SCNC: 104 MMOL/L (ref 95–110)
CO2 SERPL-SCNC: 26 MMOL/L (ref 23–29)
CREAT SERPL-MCNC: 0.7 MG/DL (ref 0.5–1.4)
DIFFERENTIAL METHOD: ABNORMAL
EOSINOPHIL # BLD AUTO: 0 K/UL (ref 0–0.5)
EOSINOPHIL NFR BLD: 0.1 % (ref 0–8)
ERYTHROCYTE [DISTWIDTH] IN BLOOD BY AUTOMATED COUNT: 13 % (ref 11.5–14.5)
EST. GFR  (AFRICAN AMERICAN): >60 ML/MIN/1.73 M^2
EST. GFR  (NON AFRICAN AMERICAN): >60 ML/MIN/1.73 M^2
GLUCOSE SERPL-MCNC: 115 MG/DL (ref 70–110)
HCT VFR BLD AUTO: 35.3 % (ref 37–48.5)
HGB BLD-MCNC: 11.7 G/DL (ref 12–16)
IMM GRANULOCYTES # BLD AUTO: 0.13 K/UL (ref 0–0.04)
IMM GRANULOCYTES NFR BLD AUTO: 1.7 % (ref 0–0.5)
LYMPHOCYTES # BLD AUTO: 1.9 K/UL (ref 1–4.8)
LYMPHOCYTES NFR BLD: 24.3 % (ref 18–48)
MAGNESIUM SERPL-MCNC: 2.2 MG/DL (ref 1.6–2.6)
MCH RBC QN AUTO: 29.9 PG (ref 27–31)
MCHC RBC AUTO-ENTMCNC: 33.1 G/DL (ref 32–36)
MCV RBC AUTO: 90 FL (ref 82–98)
MONOCYTES # BLD AUTO: 0.6 K/UL (ref 0.3–1)
MONOCYTES NFR BLD: 7.8 % (ref 4–15)
NEUTROPHILS # BLD AUTO: 5 K/UL (ref 1.8–7.7)
NEUTROPHILS NFR BLD: 65.6 % (ref 38–73)
NRBC BLD-RTO: 0 /100 WBC
PHOSPHATE SERPL-MCNC: 3.1 MG/DL (ref 2.7–4.5)
PLATELET # BLD AUTO: 273 K/UL (ref 150–350)
PLATELET BLD QL SMEAR: ABNORMAL
PMV BLD AUTO: 10.7 FL (ref 9.2–12.9)
POCT GLUCOSE: 101 MG/DL (ref 70–110)
POCT GLUCOSE: 165 MG/DL (ref 70–110)
POCT GLUCOSE: 169 MG/DL (ref 70–110)
POCT GLUCOSE: 206 MG/DL (ref 70–110)
POLYCHROMASIA BLD QL SMEAR: ABNORMAL
POTASSIUM SERPL-SCNC: 3.8 MMOL/L (ref 3.5–5.1)
PROT SERPL-MCNC: 6.4 G/DL (ref 6–8.4)
RBC # BLD AUTO: 3.91 M/UL (ref 4–5.4)
SODIUM SERPL-SCNC: 142 MMOL/L (ref 136–145)
WBC # BLD AUTO: 7.66 K/UL (ref 3.9–12.7)

## 2020-11-13 PROCEDURE — 92507 TX SP LANG VOICE COMM INDIV: CPT

## 2020-11-13 PROCEDURE — 25000003 PHARM REV CODE 250: Performed by: INTERNAL MEDICINE

## 2020-11-13 PROCEDURE — 94664 DEMO&/EVAL PT USE INHALER: CPT

## 2020-11-13 PROCEDURE — 97535 SELF CARE MNGMENT TRAINING: CPT

## 2020-11-13 PROCEDURE — 99233 SBSQ HOSP IP/OBS HIGH 50: CPT | Mod: 95,,, | Performed by: INTERNAL MEDICINE

## 2020-11-13 PROCEDURE — 83735 ASSAY OF MAGNESIUM: CPT

## 2020-11-13 PROCEDURE — 85025 COMPLETE CBC W/AUTO DIFF WBC: CPT

## 2020-11-13 PROCEDURE — 36415 COLL VENOUS BLD VENIPUNCTURE: CPT

## 2020-11-13 PROCEDURE — 63600175 PHARM REV CODE 636 W HCPCS: Performed by: INTERNAL MEDICINE

## 2020-11-13 PROCEDURE — 20600001 HC STEP DOWN PRIVATE ROOM

## 2020-11-13 PROCEDURE — 94761 N-INVAS EAR/PLS OXIMETRY MLT: CPT

## 2020-11-13 PROCEDURE — 94640 AIRWAY INHALATION TREATMENT: CPT

## 2020-11-13 PROCEDURE — 25000003 PHARM REV CODE 250: Performed by: HOSPITALIST

## 2020-11-13 PROCEDURE — 27000221 HC OXYGEN, UP TO 24 HOURS

## 2020-11-13 PROCEDURE — 99900035 HC TECH TIME PER 15 MIN (STAT)

## 2020-11-13 PROCEDURE — 99233 PR SUBSEQUENT HOSPITAL CARE,LEVL III: ICD-10-PCS | Mod: 95,,, | Performed by: INTERNAL MEDICINE

## 2020-11-13 PROCEDURE — 84100 ASSAY OF PHOSPHORUS: CPT

## 2020-11-13 PROCEDURE — 80053 COMPREHEN METABOLIC PANEL: CPT

## 2020-11-13 RX ADMIN — ALBUTEROL SULFATE 2 PUFF: 90 AEROSOL, METERED RESPIRATORY (INHALATION) at 01:11

## 2020-11-13 RX ADMIN — THERA TABS 1 TABLET: TAB at 08:11

## 2020-11-13 RX ADMIN — LISINOPRIL 20 MG: 20 TABLET ORAL at 08:11

## 2020-11-13 RX ADMIN — ALBUTEROL SULFATE 2 PUFF: 90 AEROSOL, METERED RESPIRATORY (INHALATION) at 08:11

## 2020-11-13 RX ADMIN — DEXAMETHASONE 6 MG: 4 TABLET ORAL at 08:11

## 2020-11-13 RX ADMIN — PANTOPRAZOLE SODIUM 40 MG: 40 TABLET, DELAYED RELEASE ORAL at 08:11

## 2020-11-13 RX ADMIN — REMDESIVIR 100 MG: 100 INJECTION, POWDER, LYOPHILIZED, FOR SOLUTION INTRAVENOUS at 04:11

## 2020-11-13 RX ADMIN — CHOLECALCIFEROL TAB 25 MCG (1000 UNIT) 1000 UNITS: 25 TAB at 08:11

## 2020-11-13 RX ADMIN — FOLIC ACID 1 MG: 1 TABLET ORAL at 08:11

## 2020-11-13 RX ADMIN — OXYCODONE HYDROCHLORIDE AND ACETAMINOPHEN 500 MG: 500 TABLET ORAL at 08:11

## 2020-11-13 RX ADMIN — CEFTRIAXONE SODIUM 1 G: 1 INJECTION, POWDER, FOR SOLUTION INTRAMUSCULAR; INTRAVENOUS at 03:11

## 2020-11-13 RX ADMIN — DULOXETINE 30 MG: 30 CAPSULE, DELAYED RELEASE ORAL at 08:11

## 2020-11-13 NOTE — PT/OT/SLP PROGRESS
"Speech Language Pathology Treatment    Patient Name:  Brittney Freitas   MRN:  [id number]  Admitting Diagnosis: COVID-19    Recommendations:                 General Recommendations:  Cognitive-linguistic therapy  Diet recommendations:  Regular, Liquid Diet Level: Thin   Aspiration Precautions: 1 bite/sip at a time, Feed only when awake/alert, Frequent oral care, HOB to 90 degrees, Meds whole 1 at a time, Remain upright 30 minutes post meal and Standard aspiration precautions   General Precautions: Standard, airborne, aspiration, contact, droplet, fall  Communication strategies:  provide increased time to answer    Subjective     SLP reviewed Pt with RN, RN confirmed Pt with increased response time, tolerating diet  Pt presents calm, cooperative  She explains, "I use a pill box"    Pain/Comfort:  · Pain Rating 1: 0/10    Objective:     Has the patient been evaluated by SLP for swallowing?   Yes  Keep patient NPO? No   Current Respiratory Status: room air      Pt found awake in bed on room air. She was alert and oriented x4. Her voice was clear with adequate intensity.  She demonstrated improved response time and word-finding this service day yet occasional hesitations noted t/o conversational tasks. She recalled 2/3 unrelated items post 3 minute filled delay 100% of attempts provided moderate verbal cues for STM recall strategies. She completed fx math tasks for time management with 60% accuracy provided extra time and visual aid in room (clock.) She completed fx math questions for money management with 90% accuracy provided extra time. She sequenced a FO3 items verbally presented with 100% accuracy 3 of 3 attempts, I'ly. She provided 4+ steps for fx sequencing tasks for ADLs 100% of attempts, I'ly.  She denied difficulty with meals or medications. She initially declined PO trials with ST. Pt educated on option to continue ST via  upon d/c from acute,  safety precuations for medication/time managment tasks and " word-finding strategies. She verbalized understanding then asked for a cup of water near need of session. SLP exited room to retrieve water yet upon return Pt in video conference with MD. Session discontinued for MD rounding. No additional questions noted. Whiteboard current. RN aware of findings/recs.     Assessment:     Brittney Freitas is a 80 y.o. female with an SLP diagnosis of Cognitive-Linguistic Impairment.  She presents with good progress with goals. ST to continue to follow.     Goals:   Multidisciplinary Problems     SLP Goals        Problem: SLP Goal    Goal Priority Disciplines Outcome   SLP Goal     SLP Ongoing, Progressing   Description: Speech Language Pathology Goals  Goals expected to be met by 11/18/20    1. Pt will tolerate a regular diet with thin liquids w/o overt S/S aspiration, MOD I  2. Pt will name 10+ items in a concrete category, 90% of attempts, MOD I  3. Educate Pt and family on aspiration precautions and SLP POC  4. Pt will recall 3/3 unrelated items for immediate recall and post 3 minute filled delay, 90% of attempts, MOD I  5. Pt will complete further assessment of math, reading, writing and visiospatial skills to determine ongoing POC needs                     Plan:     · Patient to be seen:  3 x/week   · Plan of Care expires:  12/11/20  · Plan of Care reviewed with:  patient   · SLP Follow-Up:  Yes       Discharge recommendations:  home health speech therapy(pending progress)     Time Tracking:     SLP Treatment Date:   11/13/20  Speech Start Time:  1414  Speech Stop Time:  1445     Speech Total Time (min):  31 min    Billable Minutes: Speech Therapy Individual 8 and Seld Care/Home Management Training 23    MITCH Wood., [facility]-SLP  Speech-Language Pathology  Pager: [phone]    11/13/2020

## 2020-11-13 NOTE — PT/OT/SLP PROGRESS
Occupational Therapy   Treatment    Name: Brittney Freitas  MRN: [id number]  Admitting Diagnosis:  COVID-19       Recommendations:     Discharge Recommendations: home health OT, home health PT  Discharge Equipment Recommendations:  shower chair  Barriers to discharge:  Decreased caregiver support    Assessment:     Brittney Freitas is a 80 y.o. female with a medical diagnosis of COVID-19.  She presents with performance deficits affecting function are weakness, impaired endurance, impaired self care skills, impaired functional mobilty, gait instability, impaired balance, impaired cardiopulmonary response to activity. Pt tolerated session well with SpO2 sats at 94-95% while on room air. Pt completed functional mobility within pt's room with SBA using no AD and satting at 92% with activity.Discussed with MD gustafson d/c recs for HH and shower chair. Pt would benefit from continued skilled acute OT services in order to maximize independence and safety with ADLs and functional mobility to ensure safe return to PLOF in the least restrictive environment. OT recommending HH PT/OT once pt is medically appropriate for d/c.       Rehab Prognosis:  Good; patient would benefit from acute skilled OT services to address these deficits and reach maximum level of function.       Plan:     Patient to be seen 3 x/week to address the above listed problems via self-care/home management, therapeutic activities, therapeutic exercises  · Plan of Care Expires: 12/10/20  · Plan of Care Reviewed with: patient    Subjective     Pain/Comfort:  · Pain Rating 1: 0/10  · Pain Rating Post-Intervention 1: 0/10    Objective:     Communicated with: RN prior to session.  Patient found HOB elevated with telemetry, pulse ox (continuous) upon OT entry to room. Pt agreeable to therapy session.     General Precautions: Standard, airborne, droplet, fall, contact   Orthopedic Precautions:N/A   Braces: N/A     Occupational Performance:     Bed Mobility:     · Patient completed Scooting/Bridging with modified independence  · Patient completed Supine to Sit with modified independence     Functional Mobility/Transfers:  · Patient completed Sit <> Stand Transfer with supervision  with  no assistive device   · Patient completed Toilet Transfer with functional ambulation to toilet and step transfer  technique with stand by assistance with  no AD  · Functional Mobility: Pt engaging in functional mobility to simulate household/community distances approx 20ft x 2 reps  with SBA and utilizing no AD in order to maximize functional activity tolerance and standing balance required for engagement in occupations of choice.   · Pt on room air with SpO2 at 91-94 %   · No LOB or SOB present  · Pt reported slight fatigue     Activities of Daily Living:  · Grooming: supervision pt completed hand hygiene standing at sink   · Toileting: supervision for clothing management and hygiene       [facility] 6 Click ADL: 20    Treatment & Education:   Pt educated on role of OT, POC, and goals for therapy.     POC was dicussed with patient/caregiver, who was included in its development and is in agreement with the identified goals and treatment plan.    OT discussed home modifications and DME recs for safe d/c home    Time provided for therapeutic counseling and discussion of health disposition.    CM and SW notified of DME rec for shower chair    Educated on importance of EOB/OOB mobility, maintaining routine, sitting up in chair, and maximizing independence with ADLs during admission    Pt completed ADLs and functional mobility for treatment session as noted above    Pt/caregiver verbalized understanding and expressed no further concerns/questions.   Updated communication board with level of assist required (SBA x 1 person assistance) & educated RN/patient that pt is appropriate for transfers and mobility with RN/PCT.       Patient left up in chair with all lines intact, call button in reach  and RN notifiedEducation:      GOALS:   Multidisciplinary Problems     Occupational Therapy Goals        Problem: Occupational Therapy Goal    Goal Priority Disciplines Outcome Interventions   Occupational Therapy Goal     OT, PT/OT Ongoing, Progressing    Description: Goals to be met by: 11/25/2020     Patient will increase functional independence with ADLs by performing:    UE Dressing with Supervision.  LE Dressing with Supervision.  Grooming while standing at sink with Supervision.  Toileting from toilet with Supervision for hygiene and clothing management.   Toilet transfer to toilet with Supervision.                     Time Tracking:     OT Date of Treatment: 11/13/20  OT Start Time: 1450  OT Stop Time: 1513  OT Total Time (min): 23 min    Billable Minutes:Self Care/Home Management 23    Angella M Clinton OT  11/13/2020

## 2020-11-13 NOTE — PLAN OF CARE
Problem: SLP Goal  Goal: SLP Goal  Description: Speech Language Pathology Goals  Goals expected to be met by 11/18/20    1. Pt will tolerate a regular diet with thin liquids w/o overt S/S aspiration, MOD I  2. Pt will name 10+ items in a concrete category, 90% of attempts, MOD I  3. Educate Pt and family on aspiration precautions and SLP POC  4. Pt will recall 3/3 unrelated items for immediate recall and post 3 minute filled delay, 90% of attempts, MOD I  5. Pt will complete further assessment of math, reading, writing and visiospatial skills to determine ongoing POC needs    Outcome: Ongoing, Progressing     Pt tolerating current diet and progressing with goals. She might benefit from HHST upon d/c from acute to continue to improve cognitive-linguistic skills and educate Pt and family on safety precautions.

## 2020-11-13 NOTE — NURSING
Assisted back to bed from chair. Tolerated well. No acute distress voiced or noted. Continue to monitor.

## 2020-11-13 NOTE — NURSING
Sitting up in bedside chair, talking on phone. O2 sats 95% after changing oximetry probe. Spoke with physician, ok to leave telemetry leads off of patient. VS stable.

## 2020-11-13 NOTE — PLAN OF CARE
Problem: Occupational Therapy Goal  Goal: Occupational Therapy Goal  Description: Goals to be met by: 11/25/2020     Patient will increase functional independence with ADLs by performing:    UE Dressing with Supervision.  LE Dressing with Supervision.  Grooming while standing at sink with Supervision.  Toileting from toilet with Supervision for hygiene and clothing management.   Toilet transfer to toilet with Supervision.    Outcome: Ongoing, Progressing    Angella Alonzo, OTR/L  Pager: [phone]  11/13/2020

## 2020-11-13 NOTE — PROGRESS NOTES
[facility] - ICU [facility] Medicine  Telemedicine Progress Note    Patient Name: Brittney Freitas  MRN: [id number]  Patient Class: IP- Inpatient   Admission Date: 11/10/2020  Length of Stay: 3 days  Attending Physician: Zenaida Umaña MD  Primary Care Provider: Andry Byrnes MD    [facility] Medicine Team: [facility] VIRTUAL TEAM 10 Zenaida Umaña MD  Virtual Telemedicine Progress Note  Start time: 1442  Chief complaint: COVID-19  The patient location is: [facility]/[address] A  The patient arrived at: 11/10/2020  4:39 AM  Present with the patient at the time of the telemed/virtual assessment: n/a  End time:  1455  Total time spent with patient: 13 min  I have assessed findings virtually using a telemedicine platform and with assistance of the bedside nurse or telemedicine presenter.  The attending portion of this evaluation, treatment, and documentation was performed per Zenaida Umaña MD via audiovisual.    Patient was transferred to the telemedicine service on: 11/13/2020    Subjective:     Admission CC: No chief complaint on file.    Follow up visit for: COVID-19    Interval History / Events Overnight:   The patient is able to provide adequate history. Additional history was obtained from chart review. No significant events reported by Nursing.  Patient complains of dyspnea. Symptoms have improved since yesterday. Associated symptoms include: fatigue. Symptoms are decreasing in severity. Alleviating factors include: rest.  SpO2 92% on RA    Data reviewed 11/13/2020: Lab test(s) reviewed: H&H stable      Review of Systems   Constitutional: Negative for fever.   Cardiovascular: Negative for chest pain.     Objective:     Vital Signs (Most Recent):  Temp: 98.1 °F (36.7 °C) (11/13/20 0750)  Pulse: 72 (11/13/20 1342)  Resp: 20 (11/13/20 1348)  BP: (!) 157/69 (11/13/20 0750)  SpO2: (!) 92 % (11/13/20 1342) Vital Signs (24h Range):  Temp:  [97.8 °F (36.6 °C)-98.1 °F (36.7 °C)] 98.1 °F (36.7  °C)  Pulse:  [57-82] 72  Resp:  [20-32] 20  SpO2:  [92 %-97 %] 92 %  BP: (140-163)/(65-74) 157/69        There is no height or weight on file to calculate BMI.  No intake or output data in the 24 hours ending 11/13/20 1602   Physical Exam  Constitutional:       General: She is not in acute distress.     Appearance: Normal appearance. She is not diaphoretic.   Eyes:      General: Lids are normal. No scleral icterus.        Right eye: No discharge.         Left eye: No discharge.      Conjunctiva/sclera: Conjunctivae normal.   Cardiovascular:      Rate and Rhythm: Normal rate.   Pulmonary:      Effort: Pulmonary effort is normal. No tachypnea, accessory muscle usage or respiratory distress.   Abdominal:      General: There is no distension.   Skin:     Coloration: Skin is not cyanotic.   Neurological:      Mental Status: She is alert. Mental status is at baseline. She is not disoriented.   Psychiatric:         Attention and Perception: Attention normal.         Mood and Affect: Affect normal.         Speech: Speech normal.         Behavior: Behavior is cooperative.         Significant Labs:     Recent Labs   Lab 11/12/20  1636 11/13/20  0801 11/13/20  1210   POCTGLUCOSE 196* 101 169*     Recent Labs   Lab 11/11/20  0244 11/12/20  0308 11/13/20  0245   WBC 8.11 5.15 7.66   HGB 11.3* 11.4* 11.7*   HCT 35.4* 35.2* 35.3*    113* 273     Recent Labs   Lab 11/11/20  0244 11/12/20  0308 11/13/20  0245   GRAN 75.2*  6.1 71.1  3.7 65.6  5.0   LYMPH 15.5*  1.3 19.4  1.0 24.3  1.9   MONO 8.5  0.7 8.5  0.4 7.8  0.6   EOS 0.0 0.0 0.0     Recent Labs   Lab 11/11/20  0244 11/12/20  0308 11/13/20  0245    143 142   K 3.7 3.6 3.8    105 104   CO2 22* 22* 26   BUN 17 20 22   CREATININE 0.6 0.7 0.7   * 140* 115*   CALCIUM 8.1* 8.1* 8.0*   ALBUMIN 2.9* 3.0* 2.9*   MG 1.8 2.2 2.2   PHOS 2.3* 3.5 3.1     Recent Labs   Lab 11/10/20  0734 11/11/20  0244 11/12/20  0308 11/13/20  0245   ALKPHOS 48* 41* 42*  47*   ALT 13 12 14 16   AST 22 24 22 25   PROT 7.1 6.4 6.4 6.4   BILITOT 0.3 0.3 0.3 0.3   INR 0.9  --   --   --      Procalcitonin (ng/mL)   Date Value   11/10/2020 0.05   11/09/2020 0.07     Lactate (Lactic Acid) (mmol/L)   Date Value   11/10/2020 1.1   11/09/2020 1.3     BNP (pg/mL)   Date Value   11/10/2020 12     Sed Rate (mm/Hr)   Date Value   11/10/2020 55 (H)     No results found for: DDIMER  Ferritin (ng/mL)   Date Value   11/12/2020 351 (H)   11/10/2020 295     No results found for: LDH  Troponin I (ng/mL)   Date Value   11/09/2020 0.06     CPK (U/L)   Date Value   11/10/2020 109     Results for orders placed or performed during the hospital encounter of 11/10/20   Vitamin D   Result Value Ref Range    Vit D, 25-Hydroxy 70 30 - 96 ng/mL     SARS-CoV2 (COVID-19) Qualitative PCR (no units)   Date Value   06/15/2020 Not Detected     SARS-CoV-2 RNA, Amplification, Qual (no units)   Date Value   11/09/2020 Positive (A)       X-Ray Chest AP Portable  Narrative: EXAMINATION:  XR CHEST AP PORTABLE    CLINICAL HISTORY:  . Essential (primary) hypertension    TECHNIQUE:  Single frontal portable view of the chest was performed.    COMPARISON:  None    FINDINGS:  There is mild chronic interstitial change.  Minimal linear discoid atelectasis is present at the lung bases.  No focal consolidation.  No significant pleural effusion.    Heart size is normal.  Mediastinal contours unremarkable.  Trachea midline.    Bony thorax intact.  Impression: Mild chronic interstitial change without focal consolidation.    Electronically signed by: Chris Nguyen  Date:    11/10/2020  Time:    07:32  CT Head Without Contrast  Narrative: EXAMINATION:  CT HEAD WITHOUT CONTRAST    CLINICAL HISTORY:  Altered mental status;    TECHNIQUE:  Low dose axial images were obtained through the head.  Coronal and sagittal reformations were also performed. Contrast was not administered.    COMPARISON:  None.    FINDINGS:  There is no acute hemorrhage  or infarction.  There is cortical atrophy.  There are periventricular deep white matter changes consistent with chronic small vessel ischemic disease.  Small remote lacunar infarcts of the bilateral basal ganglia.    No extra-axial fluid collections.  Ventricles are normal in size, shape and configuration.  The basal cisterns are patent.    Prior paranasal sinus surgery.  Mild circumferential mucoperiosteal thickening involving the maxillary sinuses.  Moderate circumferential mucoperiosteal thickening of the frontal and sphenoid sinuses.  Near complete opacification of the ethmoid air cells.    The mastoid air cells and middle ears are normally pneumatized.  Impression: 1. Cortical atrophy with periventricular deep white matter change consistent with chronic small vessel ischemic disease.  2. Small remote lacunar infarcts of the bilateral basal ganglia.  3. Chronic pansinusitis.  The preliminary and final reports are concordant.    Electronically signed by: Chris Nguyen  Date:    11/10/2020  Time:    07:19      Assessment/Plan:      Active Diagnoses:    Diagnosis Date Noted POA    PRINCIPAL PROBLEM:  COVID-19 [U07.1] 11/10/2020 Yes    Acute hypoxemic respiratory failure [J96.01] 11/10/2020 Yes    Pneumonia due to COVID-19 virus [U07.1, J12.89] 11/10/2020 Yes    Thrombocytopenia [D69.6] 11/10/2020 Yes    Osteoporosis [M81.0] 09/22/2020 Yes    Type 2 diabetes mellitus [E11.9] 09/22/2020 Yes    Benign hypertension [I10] 09/22/2020 Yes    Impaired cognition [R41.89] 09/22/2020 Yes    Adjustment disorder with mixed emotional features [F43.29] 09/22/2020 Yes      Problems Resolved During this Admission:       Overview / ICU Course:    Brittney Freitas is a 80 y.o. female admitted for Pneumonia due to COVID-19 virus.    Inpatient Medications Prescribed for Management of Current Problems:     Scheduled Meds:    albuterol  2 puff Inhalation Q6H    ascorbic acid (vitamin C)  500 mg Oral BID    cefTRIAXone  (ROCEPHIN) IVPB  1 g Intravenous Q24H    dexAMETHasone  6 mg Oral Daily    DULoxetine  30 mg Oral Daily    folic acid  1 mg Oral Daily    lisinopriL  20 mg Oral Daily    multivitamin  1 tablet Oral Daily    pantoprazole  40 mg Oral Daily    remdesivir infusion  100 mg Intravenous Q24H    vitamin D  1,000 Units Oral Daily     Continuous Infusions:   As Needed: acetaminophen, benzonatate, dextromethorphan-guaifenesin  mg/5 ml, dextrose 50%, dextrose 50%, glucagon (human recombinant), glucose, glucose, insulin aspart U-100, ondansetron, promethazine, sodium chloride 0.9%    Assessment and Plan by Problem    COVID-19 Virus Infection:  Viral Pneumonia due to COVID-19:  -Pt tested for COVID-19 and noted to be positive  -CXR shows Mild chronic interstitial change without focal consolidation.  -Isolation: Airborne/Droplet. Surgical mask on patient. Notify Infection Control  -[facility]  -Monitoring: Telemetry & Continuous Pulse Oximetry  -Antibiotics: started on ceftriaxone 1g Q24h x 5 days and azithromycin 500mg po x1, then 250mg po daily x 4 days  -Nutrition:               -Multivitamin PO daily              -Add Boost supplement              -Vitamin D 1000IU daily if deficient              -Ascorbic acid 500mg PO bid  -Supportive Care:              -Acetaminophen 650mg PO Q6hr PRN fever/headache              -Loperamide PRN viral diarrhea              -Robitussin, tessalon perls for cough              -IVF if indicated, restrictive strategy preferred, no maintenance IV if able   -Investigational Therapies:  -Atorvastatin 40mg po daily              -Due to hypoxia, pt started on dexamethasone 6mg PO daily up to 10 days or until pt is discharged from hospital (whichever is sooner)              -Pt meets criteria for remdesivir. Pt provided verbal consent to start this medication and it being ordered/dosed per [facility].      Acute Hypoxemic Respiratory  Failure:  -patient currently requiring 2 L O2 via nasal cannula  -RT consult via Respiratory Communication for COVID Protocols  -If wheezing, albuterol INH Q6h scheduled & PRN  -Incentive Spirometer Q4h  -Flutter Valve Q4h  -Continuous pulse oximetry; titrate oxygen to keep sats between 92-96%  -Wean off O2 as tolerated    -Supplemental O2 via LFNC, VentiMask, or HFNC (see Respiratory Support Oxygen Therapies)  -Proning Protocol if patient is a candidate with GCS >13 and able to self-prone (see  Proning Protocol)  -If deterioration, may warrant trial of NIPPV and transfer to [facility] room or immediate ICU consult     Acute Encephalopathy:  resolved  Unclear if infectious vs medication induced  Holding sedating medications; can add back as tolerated.     Thrombocytopenia:  -has a history of ITP  -heme/onc consulted  -no active signs of bleeding  -holding Lovenox  -order peripheral smear  -transfuse plt if plt count drops < [id number]  -Improved     Hypertension:  -stable  -Continue PTA  lisinopril 20  -monitor vitals  -SBP goal of <160 in hospital     Mood disorder:  Held home medications on admit due to AMS  start back duloxetine and can add back sequentially as indicated.  -hold home BuSpar, Lexapro, Seroquel for now     Chronic pain:  -hold chronic pain medications due to confusion, add back as needed  -hold home gabapentin, Norco, tramadol  No complains of pain - continue to hold.     Hypophosphatemia:  Replete .  -replete and monitor       Diet: Diet Cardiac Thin  GI Prophylaxis: Continued, chronic acid suppression therapy as OP  Significant LDAs:   IV Access Type: Peripheral  Urinary Catheter Indication if present: Patient Does Not Have Urinary Catheter  Other Lines/Tubes/Drains:    HIGH RISK CONDITION(S):   Patient has a condition that poses threat to life and bodily function: Severe Respiratory Distress     Goals of Care:    Previous admission:  11/9/20  Likely prognosis:  Fair  Code Status: Full  Code  Comfort Only: No  Hospice: No  Goals at discharge: remain at home, with physician follow-up    Discharge Planning   BASSAM: 11/14/2020     Code Status: Full Code   Is the patient medically ready for discharge?: Yes    Reason for patient still in hospital (select all that apply): Patient trending condition and Treatment  Discharge Plan A: Home, Home Health   Discharge Delays: None known at this time    VTE Risk Mitigation (From admission, onward)         Ordered     Place sequential compression device  Until discontinued      11/10/20 0510     IP VTE HIGH RISK PATIENT  Once      11/10/20 0445                  Zenaida Umaña MD  Department of Hospital Medicine   [facility] - ICU 15 [facility]

## 2020-11-13 NOTE — NURSING
Removed IV site from wrist, tip intact. Initiated 22 ga IV catheter to left forearm x 1 attempt, god blood return noted. Tolerated well. Sitting up in bedside chair.

## 2020-11-13 NOTE — NURSING
Patient sitting up at bedside, no acute distress noted or voiced. Removed oxygen for test period while patient is eating breakfast, will follow up. VS stable.

## 2020-11-13 NOTE — PLAN OF CARE
Currently not stable for discharge - presently on 2LNC - REMDESIVIR to complete on 11/15/2020. Monitoring labs - will complete 6 minute walk test prior to discharge to determine home oxygen needs.  REC is for HOME HEALTH with PT/ST - will discharge home with family.   Will continue to follow.    Melody Daniel RN  Case Management  Ext [phone]       11/13/20 0893   Discharge Reassessment   Assessment Type Discharge Planning Reassessment   Provided patient/caregiver education on the expected discharge date and the discharge plan Yes   Do you have any problems affording any of your prescribed medications? No   Discharge Plan A Home;Home Health   Discharge Plan B Home with family;Home Health   DME Needed Upon Discharge  shower chair   Patient choice form signed by patient/caregiver N/A   Anticipated Discharge Disposition Home-Health   Can the patient/caregiver answer the patient profile reliably? Yes, cognitively intact   How does the patient rate their overall health at the present time? Fair   Describe the patient's ability to walk at the present time. Minor restrictions or changes   Post-Acute Status   Post-Acute Authorization Home Health   Home Health Status Awaiting Internal Medical Clearance   Discharge Delays None known at this time

## 2020-11-13 NOTE — PT/OT/SLP PROGRESS
Physical Therapy      Patient Name:  Brittney Freitas   MRN:  [id number]    Patient not seen today. OT to see pt on this date. Will follow-up per POC.    Nicky Soni, PT, DPT  11/13/2020  [phone]

## 2020-11-14 VITALS
TEMPERATURE: 98 F | HEART RATE: 73 BPM | OXYGEN SATURATION: 93 % | RESPIRATION RATE: 19 BRPM | BODY MASS INDEX: 27.7 KG/M2 | HEIGHT: 66 IN | SYSTOLIC BLOOD PRESSURE: 139 MMHG | WEIGHT: 172.38 LBS | DIASTOLIC BLOOD PRESSURE: 64 MMHG

## 2020-11-14 PROBLEM — J96.01 ACUTE HYPOXEMIC RESPIRATORY FAILURE: Status: RESOLVED | Noted: 2020-11-10 | Resolved: 2020-11-14

## 2020-11-14 PROBLEM — Z91.89 AT RISK FOR POLYPHARMACY: Status: ACTIVE | Noted: 2020-11-14

## 2020-11-14 PROBLEM — D69.6 THROMBOCYTOPENIA: Status: RESOLVED | Noted: 2020-11-10 | Resolved: 2020-11-14

## 2020-11-14 LAB
ALBUMIN SERPL BCP-MCNC: 2.9 G/DL (ref 3.5–5.2)
ALP SERPL-CCNC: 42 U/L (ref 55–135)
ALT SERPL W/O P-5'-P-CCNC: 19 U/L (ref 10–44)
ANION GAP SERPL CALC-SCNC: 9 MMOL/L (ref 8–16)
ANISOCYTOSIS BLD QL SMEAR: SLIGHT
AST SERPL-CCNC: 22 U/L (ref 10–40)
BASOPHILS # BLD AUTO: 0.04 K/UL (ref 0–0.2)
BASOPHILS NFR BLD: 0.4 % (ref 0–1.9)
BILIRUB SERPL-MCNC: 0.3 MG/DL (ref 0.1–1)
BUN SERPL-MCNC: 20 MG/DL (ref 8–23)
BURR CELLS BLD QL SMEAR: ABNORMAL
CALCIUM SERPL-MCNC: 7.9 MG/DL (ref 8.7–10.5)
CHLORIDE SERPL-SCNC: 105 MMOL/L (ref 95–110)
CO2 SERPL-SCNC: 28 MMOL/L (ref 23–29)
CREAT SERPL-MCNC: 0.6 MG/DL (ref 0.5–1.4)
DACRYOCYTES BLD QL SMEAR: ABNORMAL
DIFFERENTIAL METHOD: ABNORMAL
EOSINOPHIL # BLD AUTO: 0 K/UL (ref 0–0.5)
EOSINOPHIL NFR BLD: 0.1 % (ref 0–8)
ERYTHROCYTE [DISTWIDTH] IN BLOOD BY AUTOMATED COUNT: 12.8 % (ref 11.5–14.5)
EST. GFR  (AFRICAN AMERICAN): >60 ML/MIN/1.73 M^2
EST. GFR  (NON AFRICAN AMERICAN): >60 ML/MIN/1.73 M^2
FERRITIN SERPL-MCNC: 240 NG/ML (ref 20–300)
GLUCOSE SERPL-MCNC: 101 MG/DL (ref 70–110)
HCT VFR BLD AUTO: 35.6 % (ref 37–48.5)
HGB BLD-MCNC: 11.7 G/DL (ref 12–16)
HYPOCHROMIA BLD QL SMEAR: ABNORMAL
IMM GRANULOCYTES # BLD AUTO: 0.16 K/UL (ref 0–0.04)
IMM GRANULOCYTES NFR BLD AUTO: 1.7 % (ref 0–0.5)
LYMPHOCYTES # BLD AUTO: 1.8 K/UL (ref 1–4.8)
LYMPHOCYTES NFR BLD: 18.9 % (ref 18–48)
MAGNESIUM SERPL-MCNC: 2.1 MG/DL (ref 1.6–2.6)
MCH RBC QN AUTO: 29.5 PG (ref 27–31)
MCHC RBC AUTO-ENTMCNC: 32.9 G/DL (ref 32–36)
MCV RBC AUTO: 90 FL (ref 82–98)
MONOCYTES # BLD AUTO: 0.8 K/UL (ref 0.3–1)
MONOCYTES NFR BLD: 7.8 % (ref 4–15)
NEUTROPHILS # BLD AUTO: 6.9 K/UL (ref 1.8–7.7)
NEUTROPHILS NFR BLD: 71.1 % (ref 38–73)
NRBC BLD-RTO: 0 /100 WBC
OVALOCYTES BLD QL SMEAR: ABNORMAL
PHOSPHATE SERPL-MCNC: 2.6 MG/DL (ref 2.7–4.5)
PLATELET # BLD AUTO: 265 K/UL (ref 150–350)
PMV BLD AUTO: 10.8 FL (ref 9.2–12.9)
POCT GLUCOSE: 96 MG/DL (ref 70–110)
POIKILOCYTOSIS BLD QL SMEAR: SLIGHT
POLYCHROMASIA BLD QL SMEAR: ABNORMAL
POTASSIUM SERPL-SCNC: 3.4 MMOL/L (ref 3.5–5.1)
PROT SERPL-MCNC: 6.3 G/DL (ref 6–8.4)
RBC # BLD AUTO: 3.96 M/UL (ref 4–5.4)
SODIUM SERPL-SCNC: 142 MMOL/L (ref 136–145)
WBC # BLD AUTO: 9.66 K/UL (ref 3.9–12.7)

## 2020-11-14 PROCEDURE — 25000003 PHARM REV CODE 250: Performed by: INTERNAL MEDICINE

## 2020-11-14 PROCEDURE — 84100 ASSAY OF PHOSPHORUS: CPT

## 2020-11-14 PROCEDURE — 80053 COMPREHEN METABOLIC PANEL: CPT

## 2020-11-14 PROCEDURE — 94761 N-INVAS EAR/PLS OXIMETRY MLT: CPT

## 2020-11-14 PROCEDURE — 99239 HOSP IP/OBS DSCHRG MGMT >30: CPT | Mod: 95,,, | Performed by: INTERNAL MEDICINE

## 2020-11-14 PROCEDURE — 25000003 PHARM REV CODE 250: Performed by: HOSPITALIST

## 2020-11-14 PROCEDURE — 99239 PR HOSPITAL DISCHARGE DAY,>30 MIN: ICD-10-PCS | Mod: 95,,, | Performed by: INTERNAL MEDICINE

## 2020-11-14 PROCEDURE — 83735 ASSAY OF MAGNESIUM: CPT

## 2020-11-14 PROCEDURE — 82728 ASSAY OF FERRITIN: CPT

## 2020-11-14 PROCEDURE — 63600175 PHARM REV CODE 636 W HCPCS: Performed by: INTERNAL MEDICINE

## 2020-11-14 PROCEDURE — 85025 COMPLETE CBC W/AUTO DIFF WBC: CPT

## 2020-11-14 PROCEDURE — 99900035 HC TECH TIME PER 15 MIN (STAT)

## 2020-11-14 PROCEDURE — 36415 COLL VENOUS BLD VENIPUNCTURE: CPT

## 2020-11-14 PROCEDURE — 94640 AIRWAY INHALATION TREATMENT: CPT

## 2020-11-14 RX ORDER — POTASSIUM CHLORIDE 20 MEQ/1
40 TABLET, EXTENDED RELEASE ORAL ONCE
Status: COMPLETED | OUTPATIENT
Start: 2020-11-14 | End: 2020-11-14

## 2020-11-14 RX ADMIN — LISINOPRIL 20 MG: 20 TABLET ORAL at 09:11

## 2020-11-14 RX ADMIN — FOLIC ACID 1 MG: 1 TABLET ORAL at 09:11

## 2020-11-14 RX ADMIN — CHOLECALCIFEROL TAB 25 MCG (1000 UNIT) 1000 UNITS: 25 TAB at 09:11

## 2020-11-14 RX ADMIN — PANTOPRAZOLE SODIUM 40 MG: 40 TABLET, DELAYED RELEASE ORAL at 09:11

## 2020-11-14 RX ADMIN — ALBUTEROL SULFATE 2 PUFF: 90 AEROSOL, METERED RESPIRATORY (INHALATION) at 01:11

## 2020-11-14 RX ADMIN — THERA TABS 1 TABLET: TAB at 09:11

## 2020-11-14 RX ADMIN — ALBUTEROL SULFATE 2 PUFF: 90 AEROSOL, METERED RESPIRATORY (INHALATION) at 08:11

## 2020-11-14 RX ADMIN — OXYCODONE HYDROCHLORIDE AND ACETAMINOPHEN 500 MG: 500 TABLET ORAL at 09:11

## 2020-11-14 RX ADMIN — ALBUTEROL SULFATE 2 PUFF: 90 AEROSOL, METERED RESPIRATORY (INHALATION) at 02:11

## 2020-11-14 RX ADMIN — POTASSIUM CHLORIDE 40 MEQ: 1500 TABLET, EXTENDED RELEASE ORAL at 09:11

## 2020-11-14 RX ADMIN — DEXAMETHASONE 6 MG: 4 TABLET ORAL at 09:11

## 2020-11-14 RX ADMIN — DULOXETINE 30 MG: 30 CAPSULE, DELAYED RELEASE ORAL at 09:11

## 2020-11-14 NOTE — DISCHARGE SUMMARY
"[facility] Medicine  Telemedicine Discharge Summary      Patient Name: Brittney Freitas  MRN: [id number]  Admission Date: 11/10/2020  Hospital Length of Stay: 4 days  Discharge Date and Time:  11/14/2020 1:02 PM  Attending Physician: Zenaida Umaña MD   Discharging Provider: Zenaida Umaña MD  Primary Care Provider: Andry Byrnes MD    [facility] Medicine Team: [facility] VIRTUAL TEAM 10 Zenaida Umaña MD  Patient was transferred to the telemedicine service on: 11/13/2020  This document was prepared by chart review and may not directly reflect my personal knowledge of the patient's case, clinical course, or significant events during the hospital stay.      HPI:  81 yo female with DM2, HTN, allergies presenting for confusion, fever and shortness of breath. She does not recall how long she was confused for but she tested positive for COVID about 4 days ago. Today she had a temperature of 101.4 at OSH, some increased work of breathing and desatted to upper 80s on room air. Reportedly multiple family members tested positive for COVID. She is somewhat slow to respond to questions.   As per  note, "80 y.o. female who has a past medical history of Diabetes mellitus, type 2, Hypertension, Reflux esophagitis, and Seasonal allergies presented with confusion, fever, and shortness of breath. Patient presented to [place] ED and was found to have fever of 101 and hypoxia satting 88-89% on RA. Chest xray with bilateral infiltrates. CT head with no acute changes. Lactic acid 1.3. COVID positive. Patient treated with IV Rocephin and azithromycin. Also given IV steroids. BP running in low 100's systolic improved after IVF's. Facility seeking transfer. No COVID beds available on [facility]. "    * No surgery found *      Hospital Course:   Brittney Freitas was admitted to [facility] Medicine for treatment of suspected COVID-19 viral infection and was treated with supportive care following a " comprehensive physical, radiographic, and lab evaluation tailored to the current standard of care for COVID19 at that time. Please review the admission H&P and the studies listed below for details. She improved with supportive care and was found to be suitable for discharge home without oxygen.    On the day of discharge, isolation precautions were reviewed at length verbally. The patient was also provided with written isolation guidelines modified from the [place] Department of Health and [facility] [place] as well as the CDC, as part of discharge paperwork. An updated phone number was obtained, and positive patients will be enrolled in the COVID-19 Home Symptom Monitoring program.    Additional details of the hospitalization include:    COVID-19 Virus Infection:  Viral Pneumonia due to COVID-19:  -Pt tested for COVID-19 and noted to be positive  -CXR shows Mild chronic interstitial change without focal consolidation.  -Isolation: Airborne/Droplet. Surgical mask on patient. Notify Infection Control  -Management: per [facility] COVID Treatment Protocol (4/15/20)  -Monitoring: Telemetry & Continuous Pulse Oximetry  -Antibiotics: started on ceftriaxone 1g Q24h x 5 days and azithromycin 500mg po x1, then 250mg po daily x 4 days  -Nutrition:               -Multivitamin PO daily              -Add Boost supplement              -Vitamin D 1000IU daily if deficient              -Ascorbic acid 500mg PO bid  -Supportive Care:              -Acetaminophen 650mg PO Q6hr PRN fever/headache              -Loperamide PRN viral diarrhea              -Robitussin, tessalon perls for cough              -IVF if indicated, restrictive strategy preferred, no maintenance IV if able   -Investigational Therapies:  -Atorvastatin 40mg po daily              -Due to hypoxia, pt started on dexamethasone 6mg PO daily up to 10 days or until pt is discharged from hospital (whichever is sooner)              -Pt meets criteria for remdesivir. Pt provided verbal  consent to start this medication and it being ordered/dosed per [facility].      Acute Hypoxemic Respiratory Failure:  -patient currently requiring 2 L O2 via nasal cannula  -RT consult via Respiratory Communication for COVID Protocols  -If wheezing, albuterol INH Q6h scheduled & PRN  -Incentive Spirometer Q4h  -Flutter Valve Q4h  -Continuous pulse oximetry; titrate oxygen to keep sats between 92-96%  -Wean off O2 as tolerated    -Supplemental O2 via LFNC, VentiMask, or HFNC (see Respiratory Support Oxygen Therapies)  -Proning Protocol if patient is a candidate with GCS >13 and able to self-prone (see  Proning Protocol)  -If deterioration, may warrant trial of NIPPV and transfer to [facility] pressure room or immediate ICU consult     Acute Encephalopathy:  Polypharmacy  Unclear if infectious vs medication induced  Holding sedating medications; can add back as tolerated.  Extensive medication list reviewed; resumed at discharge but needs review as outpatient and elimination of unnecessary medications.     Thrombocytopenia:  -has a history of ITP  -heme/onc consulted  -no active signs of bleeding  -holding Lovenox  -order peripheral smear  -transfuse plt if plt count drops < 16972  -Improved     Hypertension:  -stable  -Continue PTA  lisinopril 20  -monitor vitals  -SBP goal of <160 in hospital     Mood disorder:  Held home medications on admit due to AMS  start back duloxetine and can add back sequentially as indicated.  -hold home BuSpar, Lexapro, Seroquel for now     Chronic pain:  -hold chronic pain medications due to confusion, add back as needed  -hold home gabapentin, Norco, tramadol  No complains of pain - continue to hold.     Hypophosphatemia:  Replete .  -replete and monitor       Consults:   Consults (From admission, onward)        Status Ordering Provider     Inpatient consult to Hematology/Oncology  Once     Provider:  (Not yet assigned)    Completed MELINA MATA     Inpatient virtual consult to Hospital  "Medicine  Once     Provider:  (Not yet assigned)    Completed TRISTEN MCDONALD     Pharmacy Remdesivir Consult  Once     Provider:  (Not yet assigned)    Acknowledged MELINA MATA          Final Active Diagnoses:    Diagnosis Date Noted POA    PRINCIPAL PROBLEM:  Pneumonia due to COVID-19 virus [U07.1, J12.89] 11/10/2020 Yes    At risk for polypharmacy [Z91.89] 11/14/2020 Yes    COVID-19 [U07.1] 11/10/2020 Yes    Osteoporosis [M81.0] 09/22/2020 Yes    Type 2 diabetes mellitus [E11.9] 09/22/2020 Yes    Benign hypertension [I10] 09/22/2020 Yes    Impaired cognition [R41.89] 09/22/2020 Yes    Adjustment disorder with mixed emotional features [F43.29] 09/22/2020 Yes      Problems Resolved During this Admission:    Diagnosis Date Noted Date Resolved POA    Acute hypoxemic respiratory failure [J96.01] 11/10/2020 11/14/2020 Yes    Thrombocytopenia [D69.6] 11/10/2020 11/14/2020 Yes      Discharged Condition: stable    Disposition: Home-Health Care [facility]    Follow Up:  Follow-up Information     Andry Byrnes MD On 11/24/2020.    Specialty: Internal Medicine  Why: hospital follow-up @ 2:30pm  Contact information:  [address] [place] [id number] [phone]                   Patient Instructions:      BATH/SHOWER CHAIR FOR HOME USE     Order Specific Question Answer Comments   Height: 5'6"    Weight: 170 lb    Does patient have medical equipment at home? rollator    Does patient have medical equipment at home? bedside commode    Length of need (1-99 months): 99    Type: With back      Diet Cardiac     Notify your health care provider if you experience any of the following:  temperature >100.4     Notify your health care provider if you experience any of the following:  persistent nausea and vomiting or diarrhea     Notify your health care provider if you experience any of the following:  difficulty breathing or increased cough     Notify your health care provider if you experience any of the following:  severe " persistent headache     Notify your health care provider if you experience any of the following:  persistent dizziness, light-headedness, or visual disturbances     Notify your health care provider if you experience any of the following:  increased confusion or weakness     COVID-19 Home Symptom Monitoring  - Duration (days): 14     Order Specific Question Answer Comments   Duration (days) 14      Activity as tolerated     Medications:  Reconciled Home Medications:      Medication List      CONTINUE taking these medications    busPIRone 10 MG tablet  Commonly known as: BUSPAR  Take 10 mg by mouth 3 (three) times daily.     calcium carbonate 600 mg calcium (1,500 mg) Tab  Commonly known as: OS-IVIS  Take 600 mg by mouth 2 (two) times daily with meals.     cetirizine 10 MG tablet  Commonly known as: ZYRTEC  Take 10 mg by mouth once daily.     * diclofenac 0.1 % ophthalmic solution  Commonly known as: VOLTAREN  1 drop 4 (four) times daily.     * diclofenac sodium 1 % Gel  Commonly known as: VOLTAREN  Apply 2 g topically 3 (three) times daily.     DULoxetine 60 MG capsule  Commonly known as: CYMBALTA  Take 60 mg by mouth once daily.     ergocalciferol 50,000 unit Cap  Commonly known as: ERGOCALCIFEROL  Take 50,000 Units by mouth every 7 days.     escitalopram oxalate 20 MG tablet  Commonly known as: LEXAPRO  Take 20 mg by mouth once daily.     fluticasone propionate 50 mcg/actuation nasal spray  Commonly known as: FLONASE  See Instructions, USE 1 SPRAY IN EACH NOSTRIL DAILY, gm, 7 Refill(s), # 48, eRx: [facility]     folic acid 1 MG tablet  Commonly known as: FOLVITE  Take 1 mg by mouth once daily.     gabapentin 100 MG capsule  Commonly known as: NEURONTIN  Take 3 capsules (300 mg total) by mouth 3 (three) times daily.     lisinopriL 20 MG tablet  Commonly known as: PRINIVIL,ZESTRIL  Take 20 mg by mouth once daily.     montelukast 10 mg tablet  Commonly known as: SINGULAIR  Take 10 mg by mouth every  evening.     PRESERVISION AREDS-2 ORAL  Take by mouth.     PROTONIX 40 MG tablet  Generic drug: pantoprazole  Take 40 mg by mouth once daily.     QUEtiapine 25 MG Tab  Commonly known as: SEROQUEL  Take by mouth.     REPATHA SURECLICK SUBQ  Inject into the skin.         * This list has 2 medication(s) that are the same as other medications prescribed for you. Read the directions carefully, and ask your doctor or other care provider to review them with you.            STOP taking these medications    HYDROcodone-acetaminophen 5-325 mg per tablet  Commonly known as: NORCO     traMADoL 50 mg tablet  Commonly known as: ULTRAM            Significant Diagnostic Studies:   Recent Labs   Lab 11/12/20 0308 11/13/20 0245 11/14/20  0502   WBC 5.15 7.66 9.66   HGB 11.4* 11.7* 11.7*   HCT 35.2* 35.3* 35.6*   * 273 265     Recent Labs   Lab 11/12/20 0308 11/13/20 0245 11/14/20  0502   GRAN 71.1  3.7 65.6  5.0 71.1  6.9   LYMPH 19.4  1.0 24.3  1.9 18.9  1.8   MONO 8.5  0.4 7.8  0.6 7.8  0.8   EOS 0.0 0.0 0.0     Recent Labs   Lab 11/12/20 0308 11/13/20 0245 11/14/20  0502    142 142   K 3.6 3.8 3.4*    104 105   CO2 22* 26 28   BUN 20 22 20   CREATININE 0.7 0.7 0.6   * 115* 101   CALCIUM 8.1* 8.0* 7.9*   ALBUMIN 3.0* 2.9* 2.9*   MG 2.2 2.2 2.1   PHOS 3.5 3.1 2.6*     Recent Labs   Lab 11/10/20  0734  11/12/20 0308 11/13/20 0245 11/14/20  0502   ALKPHOS 48*   < > 42* 47* 42*   ALT 13   < > 14 16 19   AST 22   < > 22 25 22   PROT 7.1   < > 6.4 6.4 6.3   BILITOT 0.3   < > 0.3 0.3 0.3   INR 0.9  --   --   --   --     < > = values in this interval not displayed.     Procalcitonin (ng/mL)   Date Value   11/10/2020 0.05   11/09/2020 0.07     Lactate (Lactic Acid) (mmol/L)   Date Value   11/10/2020 1.1   11/09/2020 1.3     BNP (pg/mL)   Date Value   11/10/2020 12     Sed Rate (mm/Hr)   Date Value   11/10/2020 55 (H)     Ferritin (ng/mL)   Date Value   11/14/2020 240   11/12/2020 351 (H)    11/10/2020 295     Troponin I (ng/mL)   Date Value   11/09/2020 0.06     CPK (U/L)   Date Value   11/10/2020 109     Results for orders placed or performed during the hospital encounter of 11/10/20   Vitamin D   Result Value Ref Range    Vit D, 25-Hydroxy 70 30 - 96 ng/mL     SARS-CoV2 (COVID-19) Qualitative PCR (no units)   Date Value   06/15/2020 Not Detected     SARS-CoV-2 RNA, Amplification, Qual (no units)   Date Value   11/09/2020 Positive (A)       X-Ray Chest AP Portable  Narrative: EXAMINATION:  XR CHEST AP PORTABLE    CLINICAL HISTORY:  . Essential (primary) hypertension    TECHNIQUE:  Single frontal portable view of the chest was performed.    COMPARISON:  None    FINDINGS:  There is mild chronic interstitial change.  Minimal linear discoid atelectasis is present at the lung bases.  No focal consolidation.  No significant pleural effusion.    Heart size is normal.  Mediastinal contours unremarkable.  Trachea midline.    Bony thorax intact.  Impression: Mild chronic interstitial change without focal consolidation.    Electronically signed by: Chris Nguyen  Date:    11/10/2020  Time:    07:32  CT Head Without Contrast  Narrative: EXAMINATION:  CT HEAD WITHOUT CONTRAST    CLINICAL HISTORY:  Altered mental status;    TECHNIQUE:  Low dose axial images were obtained through the head.  Coronal and sagittal reformations were also performed. Contrast was not administered.    COMPARISON:  None.    FINDINGS:  There is no acute hemorrhage or infarction.  There is cortical atrophy.  There are periventricular deep white matter changes consistent with chronic small vessel ischemic disease.  Small remote lacunar infarcts of the bilateral basal ganglia.    No extra-axial fluid collections.  Ventricles are normal in size, shape and configuration.  The basal cisterns are patent.    Prior paranasal sinus surgery.  Mild circumferential mucoperiosteal thickening involving the maxillary sinuses.  Moderate circumferential  mucoperiosteal thickening of the frontal and sphenoid sinuses.  Near complete opacification of the ethmoid air cells.    The mastoid air cells and middle ears are normally pneumatized.  Impression: 1. Cortical atrophy with periventricular deep white matter change consistent with chronic small vessel ischemic disease.  2. Small remote lacunar infarcts of the bilateral basal ganglia.  3. Chronic pansinusitis.  The preliminary and final reports are concordant.    Electronically signed by: Chris Nguyen  Date:    11/10/2020  Time:    07:19        Pending Diagnostic Studies:     None        Indwelling Lines/Drains at time of discharge:  None    Time spent on the discharge of patient: 45 minutes  Patient was seen and examined on the date of discharge and determined to be suitable for discharge.  Time was spent speaking with consultants and case management, reviewing records, and/or discussing the plan of care with patient/family.  Start time: 1248  Chief complaint: Pneumonia due to COVID-19 virus  [facility]  The patient arrived at: 11/10/2020  4:39 AM  Present with the patient at the time of the telemed/virtual assessment: n/a  End time:  1254  Total time spent with patient: 6 min  I have assessed findings virtually using a telemedicine platform and with assistance of the bedside nurse or telemedicine presenter.  The attending portion of this evaluation, treatment, and documentation was performed per Zenaida Umaña MD via audiovisual.      Zenaida Umaña MD  Department of Hospital Medicine  [facility]

## 2020-11-14 NOTE — PLAN OF CARE
Problem: Adult Inpatient Plan of Care  Goal: Plan of Care Review  Outcome: Met   Poc reviewed with pt. A&ox4. Vss. Pt ambulatory with ease on room air. Pt d.c at this time. Iv removed with no redness or swelling. Belongings with pt at this time. Pt transported via wheelchair to d/c. Daughter at bedside. No acute distress. No concerns voiced at this time.

## 2020-11-14 NOTE — PLAN OF CARE
EOS: No acute changes. Patient slept through the night without any complaints. VSS. High blood pressure but not a change for patient. No complaints voiced. Care plan reviewed. Education provided. Hourly rounding completed. wctm  Problem: Diabetes Comorbidity  Goal: Blood Glucose Level Within Desired Range  Outcome: Ongoing, Progressing     Problem: Adult Inpatient Plan of Care  Goal: Plan of Care Review  Outcome: Ongoing, Progressing  Goal: Patient-Specific Goal (Individualization)  Outcome: Ongoing, Progressing  Goal: Absence of Hospital-Acquired Illness or Injury  Outcome: Ongoing, Progressing  Goal: Optimal Comfort and Wellbeing  Outcome: Ongoing, Progressing  Goal: Readiness for Transition of Care  Outcome: Ongoing, Progressing  Goal: Rounds/Family Conference  Outcome: Ongoing, Progressing     Problem: Infection  Goal: Infection Symptom Resolution  Outcome: Ongoing, Progressing     Problem: Fall Injury Risk  Goal: Absence of Fall and Fall-Related Injury  Outcome: Ongoing, Progressing     Problem: Skin Injury Risk Increased  Goal: Skin Health and Integrity  Outcome: Ongoing, Progressing

## 2020-11-14 NOTE — PLAN OF CARE
[facility]    HOME HEALTH ORDERS  FACE TO FACE ENCOUNTER    Patient Name: Brittney Freitas  YOB: 1940    PCP: Andry Byrnes MD   PCP Address: [address] [place] [address]  PCP Phone Number: [phone]  PCP Fax: [phone]    Encounter Date: 11/14/2020    Admit to Home Health    Diagnoses:  Active Hospital Problems    Diagnosis  POA    *Pneumonia due to COVID-19 virus [U07.1, J12.89]  Yes    At risk for polypharmacy [Z91.89]  Yes    COVID-19 [U07.1]  Yes    Osteoporosis [M81.0]  Yes    Type 2 diabetes mellitus [E11.9]  Yes    Benign hypertension [I10]  Yes    Impaired cognition [R41.89]  Yes    Adjustment disorder with mixed emotional features [F43.29]  Yes      Resolved Hospital Problems    Diagnosis Date Resolved POA    Acute hypoxemic respiratory failure [J96.01] 11/14/2020 Yes    Thrombocytopenia [D69.6] 11/14/2020 Yes       Follow-up Information     Andry Byrnes MD On 11/24/2020.    Specialty: Internal Medicine  Why: hospital follow-up @ 2:30pm  Contact information:  [address] [place] [address]  [phone]                   I have seen and examined this patient face to face today. My clinical findings that support the need for the home health skilled services and home bound status are the following:  Weakness/numbness causing balance and gait disturbance due to Infection and Weakness/Debility making it taxing to leave home.    Allergies:  Review of patient's allergies indicates:   Allergen Reactions    Adhesive     Ciprofloxacin     Escitalopram oxalate     Pregabalin     Penicillin        Diet: cardiac diet     Activities: activity as tolerated    Nursing:   SN to complete comprehensive assessment including routine vital signs. Instruct on disease process and s/s of complications to report to MD. Review/verify medication list sent home with the patient at time of discharge  and instruct patient/caregiver as needed. Frequency  may be adjusted depending on start of care date.    Notify MD if SBP > 160 or < 90; DBP > 90 or < 50; HR > 120 or < 50; Temp > 101      CONSULTS:    Physical Therapy to evaluate and treat. Evaluate for home safety and equipment needs; Establish/upgrade home exercise program. Perform / instruct on therapeutic exercises, gait training, transfer training, and Range of Motion.  Occupational Therapy to evaluate and treat. Evaluate home environment for safety and equipment needs. Perform/Instruct on transfers, ADL training, ROM, and therapeutic exercises.   to evaluate for community resources/long-range planning.      Medications: Review discharge medications with patient and family and provide education.      Current Discharge Medication List      CONTINUE these medications which have NOT CHANGED    Details   busPIRone (BUSPAR) 10 MG tablet Take 10 mg by mouth 3 (three) times daily.      calcium carbonate (OS-IVIS) 600 mg (1,500 mg) Tab Take 600 mg by mouth 2 (two) times daily with meals.      cetirizine (ZYRTEC) 10 MG tablet Take 10 mg by mouth once daily.      diclofenac (VOLTAREN) 0.1 % ophthalmic solution 1 drop 4 (four) times daily.      diclofenac sodium (VOLTAREN) 1 % Gel Apply 2 g topically 3 (three) times daily.  Qty: 200 g, Refills: 2      DULoxetine (CYMBALTA) 60 MG capsule Take 60 mg by mouth once daily.      ergocalciferol (ERGOCALCIFEROL) 50,000 unit Cap Take 50,000 Units by mouth every 7 days.      escitalopram oxalate (LEXAPRO) 20 MG tablet Take 20 mg by mouth once daily.      evolocumab (REPATHA SURECLICK SUBQ) Inject into the skin.      fluticasone propionate (FLONASE) 50 mcg/actuation nasal spray   See Instructions, USE 1 SPRAY IN EACH NOSTRIL DAILY, gm, 7 Refill(s), # 48, eRx: [facility]      folic acid (FOLVITE) 1 MG tablet Take 1 mg by mouth once daily.      gabapentin (NEURONTIN) 100 MG capsule Take 3 capsules (300 mg total) by mouth 3 (three) times daily.  Qty: 270  capsule, Refills: 1    Associated Diagnoses: Muscle spasms of neck; Cervical radiculopathy; Degenerative disc disease, cervical      lisinopril (PRINIVIL,ZESTRIL) 20 MG tablet Take 20 mg by mouth once daily.      montelukast (SINGULAIR) 10 mg tablet Take 10 mg by mouth every evening.      pantoprazole (PROTONIX) 40 MG tablet Take 40 mg by mouth once daily.      QUEtiapine (SEROQUEL) 25 MG Tab Take by mouth.      vit C/E/Zn/coppr/lutein/zeaxan (PRESERVISION AREDS-2 ORAL) Take by mouth.         STOP taking these medications       HYDROcodone-acetaminophen (NORCO) 5-325 mg per tablet Comments:   Reason for Stopping:         HYDROcodone-acetaminophen (NORCO) 5-325 mg per tablet Comments:   Reason for Stopping:         traMADol (ULTRAM) 50 mg tablet Comments:   Reason for Stopping:               I certify that this patient is confined to her home and needs intermittent skilled nursing care, physical therapy and occupational therapy.      Zenaida Umaña MD

## 2020-11-14 NOTE — NURSING
Home Oxygen Evaluation    Date Performed: 2020    1) Patient's Home O2 Sat on room air, while at rest: 98%        If O2 sats on room air at rest are 88% or below, patient qualifies. No additional testing needed. Document N/A in steps 2 and 3. If 89% or above, complete steps 2.      2) Patient's O2 Sat on room air while exercisin%        If O2 sats on room air while exercising remain 89% or above patient does not qualify, no further testing needed Document N/A in step 3. If O2 sats on room air while exercising are 88% or below, continue to step 3.      3) Patient's O2 Sat while exercising on O2: NA         (Must show improvement from #2 for patients to qualify)    If O2 sats improve on oxygen, patient qualifies for portable oxygen. If not, the patient does not qualify.

## 2020-11-15 LAB
BACTERIA BLD CULT: NORMAL

## 2020-11-15 NOTE — PLAN OF CARE
Patient medically ready for discharge to HOME with HOME HEALTH (REFERRALS SENT) Any necessary transport setup by . This CM scheduled or requested necessary follow-up appointments. Family/patient aware of discharge.    HOSPITAL FOLLOW-UP: Dr Byrnes 11/24/2020 at 2:30pm    Melody Daniel RN  Case Management  Ext: [phone]  11/15/2020       11/15/20 1137   Final Note   Assessment Type Final Discharge Note   Anticipated Discharge Disposition Home-Health  (referrals sent to in network [place] providers)   What phone number can be called within the next 1-3 days to see how you are doing after discharge? [phone]   Hospital Follow Up  Appt(s) scheduled? Yes  (11/24/2020 @ 2:30pm with Dr Byrnes)   Discharge plans and expectations educations in teach back method with documentation complete? Yes  (per bedside nurse)   Right Care Referral Info   Post Acute Recommendation Home-care   Referral Type Home Health Services   Facility Name TBD   Post-Acute Status   Post-Acute Authorization Home Health   Home Health Status Referrals Sent   Discharge Delays None known at this time

## 2021-08-19 ENCOUNTER — TELEPHONE (OUTPATIENT)
Dept: PODIATRY | Facility: CLINIC | Age: 81
End: 2021-08-19

## 2021-08-24 ENCOUNTER — OFFICE VISIT (OUTPATIENT)
Dept: PODIATRY | Facility: CLINIC | Age: 81
End: 2021-08-24
Payer: MEDICARE

## 2021-08-24 VITALS
RESPIRATION RATE: 18 BRPM | DIASTOLIC BLOOD PRESSURE: 79 MMHG | BODY MASS INDEX: 29.7 KG/M2 | SYSTOLIC BLOOD PRESSURE: 119 MMHG | WEIGHT: 184 LBS | HEART RATE: 86 BPM | TEMPERATURE: 98 F

## 2021-08-24 DIAGNOSIS — L60.8 ACQUIRED DYSMORPHIC TOENAIL: ICD-10-CM

## 2021-08-24 DIAGNOSIS — E11.9 CONTROLLED TYPE 2 DIABETES MELLITUS WITHOUT COMPLICATION, WITHOUT LONG-TERM CURRENT USE OF INSULIN: Primary | ICD-10-CM

## 2021-08-24 DIAGNOSIS — L60.0 INGROWN NAIL OF GREAT TOE OF LEFT FOOT: ICD-10-CM

## 2021-08-24 DIAGNOSIS — M79.671 BILATERAL FOOT PAIN: ICD-10-CM

## 2021-08-24 DIAGNOSIS — E11.9 COMPREHENSIVE DIABETIC FOOT EXAMINATION, TYPE 2 DM, ENCOUNTER FOR: ICD-10-CM

## 2021-08-24 DIAGNOSIS — M79.672 BILATERAL FOOT PAIN: ICD-10-CM

## 2021-08-24 PROCEDURE — 99214 PR OFFICE/OUTPT VISIT, EST, LEVL IV, 30-39 MIN: ICD-10-PCS | Mod: S$PBB,,, | Performed by: PODIATRIST

## 2021-08-24 PROCEDURE — 99215 OFFICE O/P EST HI 40 MIN: CPT | Mod: PBBFAC | Performed by: PODIATRIST

## 2021-08-24 PROCEDURE — 99214 OFFICE O/P EST MOD 30 MIN: CPT | Mod: S$PBB,,, | Performed by: PODIATRIST

## 2021-08-24 PROCEDURE — 99999 PR PBB SHADOW E&M-EST. PATIENT-LVL V: CPT | Mod: PBBFAC,,, | Performed by: PODIATRIST

## 2021-08-24 PROCEDURE — 99999 PR PBB SHADOW E&M-EST. PATIENT-LVL V: ICD-10-PCS | Mod: PBBFAC,,, | Performed by: PODIATRIST

## 2021-08-24 RX ORDER — GABAPENTIN 400 MG/1
400 CAPSULE ORAL
COMMUNITY
Start: 2020-05-29

## 2021-08-24 RX ORDER — EVOLOCUMAB 140 MG/ML
INJECTION, SOLUTION SUBCUTANEOUS
COMMUNITY
Start: 2020-06-10

## 2021-08-24 RX ORDER — ALBUTEROL SULFATE 90 UG/1
2 AEROSOL, METERED RESPIRATORY (INHALATION)
COMMUNITY
Start: 2021-07-14

## 2021-08-24 RX ORDER — PREDNISONE 20 MG/1
40 TABLET ORAL DAILY
COMMUNITY
Start: 2021-07-12 | End: 2021-12-23

## 2021-12-23 ENCOUNTER — OFFICE VISIT (OUTPATIENT)
Dept: PAIN MEDICINE | Facility: CLINIC | Age: 81
End: 2021-12-23
Payer: MEDICARE

## 2021-12-23 VITALS
WEIGHT: 194 LBS | BODY MASS INDEX: 31.18 KG/M2 | OXYGEN SATURATION: 96 % | SYSTOLIC BLOOD PRESSURE: 115 MMHG | DIASTOLIC BLOOD PRESSURE: 68 MMHG | HEART RATE: 110 BPM | HEIGHT: 66 IN | RESPIRATION RATE: 14 BRPM

## 2021-12-23 DIAGNOSIS — M12.811 RIGHT ROTATOR CUFF TEAR ARTHROPATHY: Primary | ICD-10-CM

## 2021-12-23 DIAGNOSIS — M25.511 CHRONIC PAIN OF BOTH SHOULDERS: ICD-10-CM

## 2021-12-23 DIAGNOSIS — M25.512 CHRONIC PAIN OF BOTH SHOULDERS: ICD-10-CM

## 2021-12-23 DIAGNOSIS — G89.29 CHRONIC PAIN OF BOTH SHOULDERS: ICD-10-CM

## 2021-12-23 DIAGNOSIS — M50.30 DDD (DEGENERATIVE DISC DISEASE), CERVICAL: ICD-10-CM

## 2021-12-23 DIAGNOSIS — M75.101 RIGHT ROTATOR CUFF TEAR ARTHROPATHY: Primary | ICD-10-CM

## 2021-12-23 PROCEDURE — 20610 LARGE JOINT ASPIRATION/INJECTION: R GLENOHUMERAL: ICD-10-PCS | Mod: S$PBB,RT,, | Performed by: ANESTHESIOLOGY

## 2021-12-23 PROCEDURE — 99214 OFFICE O/P EST MOD 30 MIN: CPT | Mod: 25,S$PBB,, | Performed by: ANESTHESIOLOGY

## 2021-12-23 PROCEDURE — 99214 PR OFFICE/OUTPT VISIT, EST, LEVL IV, 30-39 MIN: ICD-10-PCS | Mod: 25,S$PBB,, | Performed by: ANESTHESIOLOGY

## 2021-12-23 PROCEDURE — 99214 OFFICE O/P EST MOD 30 MIN: CPT | Mod: PBBFAC,PO,25 | Performed by: ANESTHESIOLOGY

## 2021-12-23 PROCEDURE — 99999 PR PBB SHADOW E&M-EST. PATIENT-LVL IV: CPT | Mod: PBBFAC,,, | Performed by: ANESTHESIOLOGY

## 2021-12-23 PROCEDURE — 20610 DRAIN/INJ JOINT/BURSA W/O US: CPT | Mod: PBBFAC,PO | Performed by: ANESTHESIOLOGY

## 2021-12-23 PROCEDURE — 99999 PR PBB SHADOW E&M-EST. PATIENT-LVL IV: ICD-10-PCS | Mod: PBBFAC,,, | Performed by: ANESTHESIOLOGY

## 2021-12-23 RX ORDER — UMECLIDINIUM BROMIDE AND VILANTEROL TRIFENATATE 62.5; 25 UG/1; UG/1
POWDER RESPIRATORY (INHALATION)
COMMUNITY

## 2021-12-23 RX ORDER — METHYLPREDNISOLONE ACETATE 80 MG/ML
40 INJECTION, SUSPENSION INTRA-ARTICULAR; INTRALESIONAL; INTRAMUSCULAR; SOFT TISSUE
Status: DISCONTINUED | OUTPATIENT
Start: 2021-12-23 | End: 2021-12-23 | Stop reason: HOSPADM

## 2021-12-23 RX ORDER — TRAMADOL HYDROCHLORIDE 50 MG/1
50 TABLET ORAL EVERY 6 HOURS PRN
Qty: 30 TABLET | Refills: 0 | Status: SHIPPED | OUTPATIENT
Start: 2021-12-23

## 2021-12-23 RX ORDER — CYCLOSPORINE 0.5 MG/ML
1 EMULSION OPHTHALMIC
COMMUNITY
Start: 2021-02-25

## 2021-12-23 RX ORDER — TIZANIDINE 4 MG/1
4 TABLET ORAL EVERY 8 HOURS
Qty: 90 TABLET | Refills: 0 | Status: SHIPPED | OUTPATIENT
Start: 2021-12-23 | End: 2022-02-21

## 2021-12-23 RX ADMIN — METHYLPREDNISOLONE ACETATE 40 MG: 80 INJECTION, SUSPENSION INTRA-ARTICULAR; INTRALESIONAL; INTRAMUSCULAR; SOFT TISSUE at 02:12

## 2022-02-17 ENCOUNTER — OFFICE VISIT (OUTPATIENT)
Dept: PAIN MEDICINE | Facility: CLINIC | Age: 82
End: 2022-02-17
Payer: MEDICARE

## 2022-02-17 VITALS
HEIGHT: 66 IN | BODY MASS INDEX: 31.18 KG/M2 | HEART RATE: 80 BPM | DIASTOLIC BLOOD PRESSURE: 76 MMHG | SYSTOLIC BLOOD PRESSURE: 145 MMHG | WEIGHT: 194 LBS | OXYGEN SATURATION: 100 % | TEMPERATURE: 97 F

## 2022-02-17 DIAGNOSIS — G89.29 CHRONIC PAIN OF BOTH SHOULDERS: ICD-10-CM

## 2022-02-17 DIAGNOSIS — M12.811 RIGHT ROTATOR CUFF TEAR ARTHROPATHY: Primary | ICD-10-CM

## 2022-02-17 DIAGNOSIS — M25.512 CHRONIC PAIN OF BOTH SHOULDERS: ICD-10-CM

## 2022-02-17 DIAGNOSIS — M75.101 RIGHT ROTATOR CUFF TEAR ARTHROPATHY: Primary | ICD-10-CM

## 2022-02-17 DIAGNOSIS — M25.511 CHRONIC PAIN OF BOTH SHOULDERS: ICD-10-CM

## 2022-02-17 PROCEDURE — 99214 OFFICE O/P EST MOD 30 MIN: CPT | Mod: S$PBB,,, | Performed by: ANESTHESIOLOGY

## 2022-02-17 PROCEDURE — 99214 OFFICE O/P EST MOD 30 MIN: CPT | Mod: PBBFAC,PO | Performed by: ANESTHESIOLOGY

## 2022-02-17 PROCEDURE — 99999 PR PBB SHADOW E&M-EST. PATIENT-LVL IV: ICD-10-PCS | Mod: PBBFAC,,, | Performed by: ANESTHESIOLOGY

## 2022-02-17 PROCEDURE — 99214 PR OFFICE/OUTPT VISIT, EST, LEVL IV, 30-39 MIN: ICD-10-PCS | Mod: S$PBB,,, | Performed by: ANESTHESIOLOGY

## 2022-02-17 PROCEDURE — 99999 PR PBB SHADOW E&M-EST. PATIENT-LVL IV: CPT | Mod: PBBFAC,,, | Performed by: ANESTHESIOLOGY

## 2022-02-17 RX ORDER — TRAMADOL HYDROCHLORIDE 50 MG/1
50 TABLET ORAL EVERY 6 HOURS PRN
Qty: 30 TABLET | Refills: 0 | Status: SHIPPED | OUTPATIENT
Start: 2022-02-17

## 2022-02-17 NOTE — PROGRESS NOTES
FOLLOW UP NOTE:     CHIEF COMPLAINT: neck and shoulder pain    INITIAL HISTORY OF PRESENT ILLNESS: Brittney Freitas is a 79 y.o. female with PMH significant for hx of right rotator cuff repair (1/2019), ITP (sees Hematologist in [place]), HTN, GERD, and hx of pacemaker placement presents for the evaluation of neck pain. Neck pain began < 1 year ago. Patient cannot recall neck pain prior to her right rotator cuff surgery. Patient does endorse of intermittent falls sporadically but cannot identify any specific inciting event to her trauma. Patient localizes her neck pain to the right side of her neck. The patient denies of radiation of her pain from her neck. Patient denies of any of numbness or tingling sensation. Patient reports that her pain is a 8-9/10. Patient reports that her pain is constant, burning and sharp pain. Patient denies of any urinary/fecal incontinence, saddle anesthesia, or recent falls.      Aggravating factors: extension of the neck and lateral rotation      Mitigating factors: neck injection at [facility] years ago.      Relevant Surgeries: yes; right rotator cuff repair; hx of lumbar surgery     INTERVAL HISTORY OF PRESENT ILLNESS: Brittney Freitas is a 81 y.o. female with PMH significant for hx of right rotator cuff repair (1/2019), ITP (sees Hematologist in [place]), HTN, GERD, and hx of pacemaker placement presents as an established patient for the continued management of neck pain and shoulder pain. The patient is s/p right glenohumeral intra-articular steroid injection on 12/23/2021, and she reports of good relief. Today, the patient reports that her right shoulder pain is intermittent in nature. She reports that her pain is worsened with activities that require her to lift her right shoulder over her head. She reports of benefit with Tramadol 50 mg PO PRN (does not take daily). The patient denies of any changes in the character of her pain since her last appointment. The  "patient reports that her current pain is a 7/10. Patient denies of any urinary/fecal incontinence, saddle anesthesia, or weakness.     INTERVENTIONAL PAIN HISTORY:  12/23/2021: Right glenohumeral steroid injection - good relief  7/16/2020: Right and left subacromial bursa via Dr. Pitts - reports of benefit   6/18/2020: C7-T1 cervical interlaminar epidural steroid injection  9/30/2019: C7-T1 cervical interlaminar epidural steroid injection - good relief     CURRENT PAIN MEDICATIONS:   Tramadol 50 mg PO q day PRN   Tizanidine 4 mg - reports of benefit     Previously tried:  Tylenol #3 - no benefit     Buspirone 10 mg PO TID  duloxetine 60 mg PO daily  Gabapentin 400 mg PO QID        ROS:  Review of Systems   Constitutional: Negative for chills and fever.   HENT: Negative for sore throat.    Eyes: Negative for visual disturbance.   Respiratory: Negative for shortness of breath.    Cardiovascular: Negative for chest pain.   Gastrointestinal: Negative for nausea and vomiting.   Genitourinary: Negative for difficulty urinating.   Musculoskeletal: Positive for arthralgias and neck pain.   Skin: Negative for rash.   Allergic/Immunologic: Negative for immunocompromised state.   Neurological: Negative for syncope.   Hematological: Does not bruise/bleed easily.   Psychiatric/Behavioral: Negative for suicidal ideas.        MEDICAL, SURGICAL, FAMILY, SOCIAL HX: reviewed    MEDICATIONS/ALLERGIES: reviewed    PHYSICAL EXAM:    VITALS: Vitals reviewed.   Vitals:    02/17/22 1033   BP: (!) 145/76   Pulse: 80   Temp: 97.3 °F (36.3 °C)   SpO2: 100%   Weight: 88 kg (194 lb)   Height: 5' 6" (1.676 m)   PainSc:   1   PainLoc: Neck       Physical Exam  Vitals and nursing note reviewed.   Constitutional:       Appearance: She is not diaphoretic.   HENT:      Head: Normocephalic and atraumatic.   Eyes:      General:         Right eye: No discharge.         Left eye: No discharge.      Extraocular Movements: EOM normal.      " Conjunctiva/sclera: Conjunctivae normal.   Cardiovascular:      Rate and Rhythm: Normal rate.   Pulmonary:      Effort: Pulmonary effort is normal. No respiratory distress.      Breath sounds: Normal breath sounds.   Abdominal:      Palpations: Abdomen is soft.   Skin:     General: Skin is warm and dry.      Findings: No rash.   Neurological:      Mental Status: She is alert and oriented to person, place, and time.   Psychiatric:         Mood and Affect: Mood and affect normal.         Cognition and Memory: Memory normal.         Judgment: Judgment normal.          UPPER EXTREMITIES: Normal alignment, normal range of motion, no atrophy, no skin changes,  hair growth and nail growth normal and equal bilaterally. No swelling, no tenderness. Pain against active abduction on the right shoulder.    LOWER EXTREMITIES:  Normal alignment, normal range of motion, no atrophy, no skin changes,  hair growth and nail growth normal and equal bilaterally. No swelling, no tenderness.     CERVICAL SPINE:  Cervical spine: Limited ROM is in flexion, extension and lateral rotation with increased pain.  ((+)) Spurling's maneuver bilaterally for neck pain but not radicular pain  Myofascial exam: Tenderness to palpation across cervical paraspinous region bilaterally.     MOTOR: Tone and bulk: normal bilateral upper and lower Strength: normal   Delt      Bi         Tri        WE      WF                        R          5          5          5          5          5          5            L          5          5          5          5          5          5               IP         ADD     ABD     Quad   TA        Gas      HAM  R          5          5          5          5          5          5          5  L          5          5          5          5          5          5          5     SENSATION: Decreased sensation in the C8 distribution of the right hand.   REFLEXES: normal, symmetric, nonbrisk.  Toes down, no clonus. Negative  clark's sign bilaterally.  GAIT: normal rise, base, steps, and arm swing.       IMAGING: no new imaging to review    ASSESSMENT: Brittney Freitas is a 81 y.o. female with PMH significant for hx of right rotator cuff repair (1/2019), ITP (sees Hematologist in [place]), HTN, GERD, and hx of pacemaker placement presents as an established patient for the continued management of neck pain and shoulder pain. The patient is s/p right glenohumeral intra-articular steroid injection on 12/23/2021, and she reports of good relief. Today, the patient reports that her right shoulder pain is intermittent in nature. Treatment plan outlined below.     PLAN:  1. Can repeat right glenohumeral intra-articular steroid injections in the future PRN  2. Continue tizanidine 4 mg PO TID PRN for myofascial pain as the patient reports of benefit  3. Refilled Tramadol 50 mg PO q 6 hr PRN for breakthrough pain; # 30 tablets; 0 refills.  reviewed without discrepancies.   4. I have stressed the importance of physical activity and a home exercise plan to help with chronic pain and improve health.  5. Instructed the patient to use Voltaren gel PRN for polyarthralgias  6. RTC in 2-3 months for follow-up.       Nicholas Pardo MD  Pain Management

## 2022-08-30 ENCOUNTER — OFFICE VISIT (OUTPATIENT)
Dept: PAIN MEDICINE | Facility: CLINIC | Age: 82
End: 2022-08-30
Payer: MEDICARE

## 2022-08-30 VITALS
SYSTOLIC BLOOD PRESSURE: 129 MMHG | DIASTOLIC BLOOD PRESSURE: 47 MMHG | BODY MASS INDEX: 31.18 KG/M2 | HEIGHT: 66 IN | HEART RATE: 82 BPM | WEIGHT: 194 LBS

## 2022-08-30 DIAGNOSIS — G89.29 CHRONIC PAIN OF BOTH SHOULDERS: Primary | ICD-10-CM

## 2022-08-30 DIAGNOSIS — M12.811 ROTATOR CUFF ARTHROPATHY OF RIGHT SHOULDER: ICD-10-CM

## 2022-08-30 DIAGNOSIS — M25.512 CHRONIC PAIN OF BOTH SHOULDERS: Primary | ICD-10-CM

## 2022-08-30 DIAGNOSIS — M25.511 CHRONIC PAIN OF BOTH SHOULDERS: Primary | ICD-10-CM

## 2022-08-30 PROCEDURE — 20610 LARGE JOINT ASPIRATION/INJECTION: BILATERAL GLENOHUMERAL: ICD-10-PCS | Mod: 50,S$PBB,, | Performed by: ANESTHESIOLOGY

## 2022-08-30 PROCEDURE — 20610 DRAIN/INJ JOINT/BURSA W/O US: CPT | Mod: 50,PBBFAC,PN | Performed by: ANESTHESIOLOGY

## 2022-08-30 PROCEDURE — 99999 PR PBB SHADOW E&M-EST. PATIENT-LVL III: CPT | Mod: PBBFAC,,, | Performed by: ANESTHESIOLOGY

## 2022-08-30 PROCEDURE — 99214 OFFICE O/P EST MOD 30 MIN: CPT | Mod: S$PBB,25,, | Performed by: ANESTHESIOLOGY

## 2022-08-30 PROCEDURE — 99213 OFFICE O/P EST LOW 20 MIN: CPT | Mod: PBBFAC,PN,25 | Performed by: ANESTHESIOLOGY

## 2022-08-30 PROCEDURE — 99214 PR OFFICE/OUTPT VISIT, EST, LEVL IV, 30-39 MIN: ICD-10-PCS | Mod: S$PBB,25,, | Performed by: ANESTHESIOLOGY

## 2022-08-30 PROCEDURE — 99999 PR PBB SHADOW E&M-EST. PATIENT-LVL III: ICD-10-PCS | Mod: PBBFAC,,, | Performed by: ANESTHESIOLOGY

## 2022-08-30 RX ORDER — METHYLPREDNISOLONE ACETATE 40 MG/ML
40 INJECTION, SUSPENSION INTRA-ARTICULAR; INTRALESIONAL; INTRAMUSCULAR; SOFT TISSUE
Status: DISCONTINUED | OUTPATIENT
Start: 2022-08-30 | End: 2022-08-30 | Stop reason: HOSPADM

## 2022-08-30 RX ADMIN — METHYLPREDNISOLONE ACETATE 40 MG: 40 INJECTION, SUSPENSION INTRA-ARTICULAR; INTRALESIONAL; INTRAMUSCULAR; SOFT TISSUE at 11:08

## 2022-08-30 NOTE — PROCEDURES
Large Joint Aspiration/Injection: bilateral glenohumeral    Date/Time: 8/30/2022 11:00 AM  Performed by: Nicholas Pardo MD  Authorized by: Nicholas Pardo MD     Consent Done?:  Yes (Verbal)  Indications:  Pain and arthritis  Timeout: prior to procedure the correct patient, procedure, and site was verified    Prep: patient was prepped and draped in usual sterile fashion      Local anesthesia used?: Yes    Anesthesia method: cold spray used for topicalization.  Local anesthetic:  Bupivacaine 0.25% without epinephrine  Anesthetic total (ml):  8      Details:  Needle Size:  25 G  Ultrasonic Guidance for needle placement?: No    Approach:  Posterior  Location:  Shoulder  Laterality:  Bilateral  Site:  Bilateral glenohumeral  Medications (Right):  40 mg methylPREDNISolone acetate 40 mg/mL  Medications (Left):  40 mg methylPREDNISolone acetate 40 mg/mL  Patient tolerance:  Patient tolerated the procedure well with no immediate complications

## 2022-08-30 NOTE — PROGRESS NOTES
FOLLOW UP NOTE:     CHIEF COMPLAINT: shoulder pain    INITIAL HISTORY OF PRESENT ILLNESS: Brittney Freitas is a 79 y.o. female with PMH significant for hx of right rotator cuff repair (1/2019), ITP (sees Hematologist in [place]), HTN, GERD, and hx of pacemaker placement presents for the evaluation of neck pain. Neck pain began < 1 year ago. Patient cannot recall neck pain prior to her right rotator cuff surgery. Patient does endorse of intermittent falls sporadically but cannot identify any specific inciting event to her trauma. Patient localizes her neck pain to the right side of her neck. The patient denies of radiation of her pain from her neck. Patient denies of any of numbness or tingling sensation. Patient reports that her pain is a 8-9/10. Patient reports that her pain is constant, burning and sharp pain. Patient denies of any urinary/fecal incontinence, saddle anesthesia, or recent falls.      Aggravating factors: extension of the neck and lateral rotation      Mitigating factors: neck injection at [facility] years ago.      Relevant Surgeries: yes; right rotator cuff repair; hx of lumbar surgery     INTERVAL HISTORY OF PRESENT ILLNESS: Brittney Freitas is a 82 y.o. female with PMH significant for hx of right rotator cuff repair (1/2019), ITP (sees Hematologist in [place]), HTN, GERD, and hx of pacemaker placement presents as an established patient for the continued management of neck pain and shoulder pain. The patient presents today reporting of worsening of her chronic shoulder pain that previously responded well to intra-articular injections. Today, the patient reports that her left shoulder pain > right shoulder pain. The patient reports that her pain makes it difficult for her to turn her neck. She reports of benefit with previous shoulder injections. The patient denies of any significant changes in her health since her last appointment. The patient also denies of any changes in the  "character of her pain since her last appointment. The patient reports that her current pain is a 5/10. Patient denies of any urinary/fecal incontinence, saddle anesthesia, or weakness.     INTERVENTIONAL PAIN HISTORY:  12/23/2021: Right glenohumeral steroid injection - good relief  7/16/2020: Right and left subacromial bursa via Dr. Pitts - reports of benefit   6/18/2020: C7-T1 cervical interlaminar epidural steroid injection  9/30/2019: C7-T1 cervical interlaminar epidural steroid injection - good relief     CURRENT PAIN MEDICATIONS:   Tramadol 50 mg PO q day PRN   Tizanidine 4 mg - reports of benefit          Previously tried:  Tylenol #3 - no benefit     Buspirone 10 mg PO TID  duloxetine 60 mg PO daily  Gabapentin 400 mg PO QID    ROS:  Review of Systems   Constitutional:  Negative for chills and fever.   HENT:  Negative for sore throat.    Eyes:  Negative for visual disturbance.   Respiratory:  Negative for shortness of breath.    Cardiovascular:  Negative for chest pain.   Gastrointestinal:  Negative for nausea and vomiting.   Genitourinary:  Negative for difficulty urinating.   Musculoskeletal:  Positive for arthralgias, myalgias, neck pain and neck stiffness.   Skin:  Negative for rash.   Allergic/Immunologic: Negative for immunocompromised state.   Neurological:  Negative for syncope.   Hematological:  Does not bruise/bleed easily.   Psychiatric/Behavioral:  Negative for suicidal ideas.       MEDICAL, SURGICAL, FAMILY, SOCIAL HX: reviewed    MEDICATIONS/ALLERGIES: reviewed    PHYSICAL EXAM:    VITALS: Vitals reviewed.   Vitals:    08/30/22 1123   BP: (!) 129/47   Pulse: 82   Weight: 88 kg (194 lb)   Height: 5' 6" (1.676 m)   PainSc:   5   PainLoc: Neck       Physical Exam  Vitals and nursing note reviewed.   Constitutional:       Appearance: Normal appearance. She is not toxic-appearing or diaphoretic.   HENT:      Head: Normocephalic and atraumatic.   Eyes:      General:         Right eye: No discharge.   "       Left eye: No discharge.      Extraocular Movements: Extraocular movements intact.      Conjunctiva/sclera: Conjunctivae normal.   Cardiovascular:      Rate and Rhythm: Normal rate.   Pulmonary:      Effort: Pulmonary effort is normal. No respiratory distress.      Breath sounds: Normal breath sounds.   Abdominal:      Palpations: Abdomen is soft.   Skin:     General: Skin is warm and dry.      Findings: No rash.   Neurological:      Mental Status: She is alert and oriented to person, place, and time.   Psychiatric:         Mood and Affect: Mood and affect normal.         Cognition and Memory: Memory normal.         Judgment: Judgment normal.      UPPER EXTREMITIES: Normal alignment, normal range of motion, no atrophy, no skin changes,  hair growth and nail growth normal and equal bilaterally. No swelling, no tenderness. Pain against active abduction on the right shoulder.    LOWER EXTREMITIES:  Normal alignment, normal range of motion, no atrophy, no skin changes,  hair growth and nail growth normal and equal bilaterally. No swelling, no tenderness.     CERVICAL SPINE:  Cervical spine: Limited ROM is in flexion, extension and lateral rotation with increased pain.  ((+)) Spurling's maneuver bilaterally for neck pain but not radicular pain  Myofascial exam: Tenderness to palpation across cervical paraspinous region bilaterally.     MOTOR: Tone and bulk: normal bilateral upper and lower Strength: normal   Delt      Bi         Tri        WE      WF                        R          5          5          5          5          5          5            L          5          5          5          5          5          5               IP         ADD     ABD     Quad   TA        Gas      HAM  R          5          5          5          5          5          5          5  L          5          5          5          5          5          5          5     SENSATION: Decreased sensation in the C8 distribution of the  right hand.   REFLEXES: normal, symmetric, nonbrisk.  Toes down, no clonus. Negative clark's sign bilaterally.  GAIT: normal rise, base, steps, and arm swing.    IMAGING: no new imaging to review    ASSESSMENT: Brittney Freitas is a 82 y.o. female with PMH significant for hx of right rotator cuff repair (1/2019), ITP (sees Hematologist in [place]), HTN, GERD, and hx of pacemaker placement presents as an established patient for the continued management of neck pain and shoulder pain. The patient presents today reporting of worsening of her chronic shoulder pain that previously responded well to intra-articular injections. Treatment plan outlined below.     PLAN:  Performed bilateral intra-articular glenohumeral steroid injections in clinic today  I have stressed the importance of physical activity and a home exercise plan to help with chronic pain and improve health.  RTC in 6-8 weeks for follow-up    Nicholas Pardo MD  Pain Management

## 2022-11-10 ENCOUNTER — OFFICE VISIT (OUTPATIENT)
Dept: PAIN MEDICINE | Facility: CLINIC | Age: 82
End: 2022-11-10
Payer: MEDICARE

## 2022-11-10 VITALS
RESPIRATION RATE: 16 BRPM | TEMPERATURE: 98 F | BODY MASS INDEX: 31.18 KG/M2 | HEIGHT: 66 IN | HEART RATE: 65 BPM | SYSTOLIC BLOOD PRESSURE: 137 MMHG | DIASTOLIC BLOOD PRESSURE: 87 MMHG | WEIGHT: 194 LBS

## 2022-11-10 DIAGNOSIS — M12.811 RIGHT ROTATOR CUFF TEAR ARTHROPATHY: ICD-10-CM

## 2022-11-10 DIAGNOSIS — M75.101 RIGHT ROTATOR CUFF TEAR ARTHROPATHY: ICD-10-CM

## 2022-11-10 DIAGNOSIS — M25.511 CHRONIC PAIN OF BOTH SHOULDERS: Primary | ICD-10-CM

## 2022-11-10 DIAGNOSIS — G89.29 CHRONIC PAIN OF BOTH SHOULDERS: Primary | ICD-10-CM

## 2022-11-10 DIAGNOSIS — M25.512 CHRONIC PAIN OF BOTH SHOULDERS: Primary | ICD-10-CM

## 2022-11-10 DIAGNOSIS — M12.811 ROTATOR CUFF ARTHROPATHY OF RIGHT SHOULDER: ICD-10-CM

## 2022-11-10 PROCEDURE — 99214 PR OFFICE/OUTPT VISIT, EST, LEVL IV, 30-39 MIN: ICD-10-PCS | Mod: S$PBB,,, | Performed by: ANESTHESIOLOGY

## 2022-11-10 PROCEDURE — 99999 PR PBB SHADOW E&M-EST. PATIENT-LVL III: ICD-10-PCS | Mod: PBBFAC,,, | Performed by: ANESTHESIOLOGY

## 2022-11-10 PROCEDURE — 99213 OFFICE O/P EST LOW 20 MIN: CPT | Mod: PBBFAC,PO | Performed by: ANESTHESIOLOGY

## 2022-11-10 PROCEDURE — 99214 OFFICE O/P EST MOD 30 MIN: CPT | Mod: S$PBB,,, | Performed by: ANESTHESIOLOGY

## 2022-11-10 PROCEDURE — 99999 PR PBB SHADOW E&M-EST. PATIENT-LVL III: CPT | Mod: PBBFAC,,, | Performed by: ANESTHESIOLOGY

## 2022-11-10 RX ORDER — DICLOFENAC SODIUM 10 MG/G
2 GEL TOPICAL 3 TIMES DAILY
Qty: 200 G | Refills: 2 | Status: SHIPPED | OUTPATIENT
Start: 2022-11-10

## 2022-11-10 NOTE — PROGRESS NOTES
FOLLOW UP NOTE:     CHIEF COMPLAINT: shoulder pain    INITIAL HISTORY OF PRESENT ILLNESS: Brittney Freitas is a 79 y.o. female with PMH significant for hx of right rotator cuff repair (1/2019), ITP (sees Hematologist in [place]), HTN, GERD, and hx of pacemaker placement presents for the evaluation of neck pain. Neck pain began < 1 year ago. Patient cannot recall neck pain prior to her right rotator cuff surgery. Patient does endorse of intermittent falls sporadically but cannot identify any specific inciting event to her trauma. Patient localizes her neck pain to the right side of her neck. The patient denies of radiation of her pain from her neck. Patient denies of any of numbness or tingling sensation. Patient reports that her pain is a 8-9/10. Patient reports that her pain is constant, burning and sharp pain. Patient denies of any urinary/fecal incontinence, saddle anesthesia, or recent falls.      Aggravating factors: extension of the neck and lateral rotation      Mitigating factors: neck injection at [facility] years ago.      Relevant Surgeries: yes; right rotator cuff repair; hx of lumbar surgery     INTERVAL HISTORY OF PRESENT ILLNESS: Brittney Freitas is a 82 y.o. female with PMH significant for hx of right rotator cuff repair (1/2019), ITP (sees Hematologist in [place]), HTN, GERD, and hx of pacemaker placement presents as an established patient for the continued management of neck pain and shoulder pain. The patient is s/p bilateral glenohumeral steroid injection on 8/30/2022, and she reports of significant relief. The patient reports of persistent benefit at today's clinic visit. The patient denies of recurrence of her chronic pain today. The patient is complaining of intermittent left forearm pain from hitting a door. The patient denies of any significant changes in her health since her last appointment. The patient also denies of any changes in the character of her pain since her last  "appointment. The patient reports that her current pain is a 0/10. Patient denies of any urinary/fecal incontinence, saddle anesthesia, or weakness.     INTERVENTIONAL PAIN HISTORY:  8/30/2022: Bilateral glenohumeral steroid injection - significant relief  12/23/2021: Right glenohumeral steroid injection - good relief  7/16/2020: Right and left subacromial bursa via Dr. Pitts - reports of benefit   6/18/2020: C7-T1 cervical interlaminar epidural steroid injection  9/30/2019: C7-T1 cervical interlaminar epidural steroid injection - good relief     CURRENT PAIN MEDICATIONS: N/A    Previously tried:   Tramadol 50 mg PO q day PRN   Tizanidine 4 mg - reports of benefit         Previously tried:  Tylenol #3 - no benefit  Buspirone 10 mg PO TID  duloxetine 60 mg PO daily  Gabapentin 400 mg PO QID    ROS:  Review of Systems   Constitutional:  Negative for chills and fever.   HENT:  Negative for sore throat.    Eyes:  Negative for visual disturbance.   Respiratory:  Negative for shortness of breath.    Cardiovascular:  Negative for chest pain.   Gastrointestinal:  Negative for nausea and vomiting.   Genitourinary:  Negative for difficulty urinating.   Musculoskeletal:  Positive for arthralgias.   Skin:  Negative for rash.   Allergic/Immunologic: Negative for immunocompromised state.   Neurological:  Negative for syncope.   Hematological:  Does not bruise/bleed easily.   Psychiatric/Behavioral:  Negative for suicidal ideas.       MEDICAL, SURGICAL, FAMILY, SOCIAL HX: reviewed    MEDICATIONS/ALLERGIES: reviewed    PHYSICAL EXAM:    VITALS: Vitals reviewed.   Vitals:    11/10/22 1423   BP: 137/87   Pulse: 65   Resp: 16   Temp: 98.3 °F (36.8 °C)   Weight: 88 kg (194 lb 0.1 oz)   Height: 5' 6" (1.676 m)   PainSc:   3   PainLoc: Shoulder       Physical Exam  Vitals and nursing note reviewed.   Constitutional:       Appearance: Normal appearance. She is not toxic-appearing or diaphoretic.   HENT:      Head: Normocephalic and " atraumatic.   Eyes:      General:         Right eye: No discharge.         Left eye: No discharge.      Extraocular Movements: Extraocular movements intact.      Conjunctiva/sclera: Conjunctivae normal.   Cardiovascular:      Rate and Rhythm: Normal rate.   Pulmonary:      Effort: Pulmonary effort is normal. No respiratory distress.      Breath sounds: Normal breath sounds.   Abdominal:      Palpations: Abdomen is soft.   Musculoskeletal:      Left forearm: Bony tenderness present.   Skin:     General: Skin is warm and dry.      Findings: No rash.   Neurological:      Mental Status: She is alert and oriented to person, place, and time.   Psychiatric:         Mood and Affect: Mood and affect normal.         Cognition and Memory: Memory normal.         Judgment: Judgment normal.        UPPER EXTREMITIES: Normal alignment, normal range of motion, no atrophy, no skin changes,  hair growth and nail growth normal and equal bilaterally. No swelling, no tenderness. Pain against active abduction on the right shoulder.    LOWER EXTREMITIES:  Normal alignment, normal range of motion, no atrophy, no skin changes,  hair growth and nail growth normal and equal bilaterally. No swelling, no tenderness.     CERVICAL SPINE:  Cervical spine: Limited ROM is in flexion, extension and lateral rotation with increased pain.  ((+)) Spurling's maneuver bilaterally for neck pain but not radicular pain  Myofascial exam: Tenderness to palpation across cervical paraspinous region bilaterally.     MOTOR: Tone and bulk: normal bilateral upper and lower Strength: normal   Delt      Bi         Tri        WE      WF                        R          5          5          5          5          5          5            L          5          5          5          5          5          5               IP         ADD     ABD     Quad   TA        Gas      HAM  R          5          5          5          5          5          5          5  L          5           5          5          5          5          5          5     SENSATION: Decreased sensation in the C8 distribution of the right hand.   REFLEXES: normal, symmetric, nonbrisk.  Toes down, no clonus. Negative clark's sign bilaterally.  GAIT: normal rise, base, steps, and arm swing.     IMAGING: no new imaging to review     ASSESSMENT: Brittney Freitas is a 82 y.o. female with PMH significant for hx of right rotator cuff repair (1/2019), ITP (sees Hematologist in [place]), HTN, GERD, and hx of pacemaker placement presents as an established patient for the continued management of neck pain and shoulder pain. The patient is s/p bilateral glenohumeral steroid injection on 8/30/2022, and she reports of significant relief. The patient reports of persistent benefit at today's clinic visit. The patient denies of recurrence of her chronic pain today. The patient is complaining of intermittent left forearm pain from hitting a door. Treatment plan outlined below.     PLAN:  Prescribed voltaren gel to use on left forearm PRN  I have stressed the importance of physical activity and a home exercise plan to help with chronic pain and improve health.  RTC PRN for repeat injections as needed    Nicholas Pardo MD  Pain Management

## 2024-03-09 NOTE — ED NOTES
Dr. Machado placed call to [facility] for tranfer request.    Goal Outcome Evaluation:  Plan of Care Reviewed With: patient        Progress: improving  Outcome Evaluation: Patient alert and oriented, vital signs stable. Patient complains or right tow pain, PRN medications given as ordered. Patient transitioned from IV pain medication to oral this shift, tolerating well after dose scheduling adjustments. No reports of new symptoms or discomfort, pain localiazed to right foot. Wound care performed by podiatry this am, daily wound care scheduled to start on 3/10/2024.

## (undated) DEVICE — CHLORAPREP 10.5 ML APPLICATOR

## (undated) DEVICE — NDL SAFETY 25G X 1.5 ECLIPSE

## (undated) DEVICE — SYR DISP LL 5CC

## (undated) DEVICE — PAD PREPS ALCOHOL 2-PLY LARGE

## (undated) DEVICE — SKINMARKER W/RULER DEVON

## (undated) DEVICE — SYR 10CC LUER LOCK

## (undated) DEVICE — GLOVE SURG ULTRA TOUCH 7.5

## (undated) DEVICE — KIT NERVE BLOCK PREP BAPTIST

## (undated) DEVICE — NDL HYPODERMIC BLUNT 18G 1.5IN

## (undated) DEVICE — TUBING MINIBORE EXTENSION

## (undated) DEVICE — SYS LABEL CORRECT MED

## (undated) DEVICE — SYR LUER SLIP GLASS 5ML

## (undated) DEVICE — NDL TUOHY 20 X 3.5

## (undated) DEVICE — GLOVE GAMMEX 7 PF STERILE

## (undated) DEVICE — SYR GLASS 5CC LUER LOK

## (undated) DEVICE — NDL TUOHY EPIDURAL 20G X 3.5

## (undated) DEVICE — APPLICATOR CHLORAPREP CLR 10.5

## (undated) DEVICE — STRAP OR TABLE 5IN X 72IN